# Patient Record
Sex: FEMALE | Race: BLACK OR AFRICAN AMERICAN | NOT HISPANIC OR LATINO | Employment: FULL TIME | URBAN - METROPOLITAN AREA
[De-identification: names, ages, dates, MRNs, and addresses within clinical notes are randomized per-mention and may not be internally consistent; named-entity substitution may affect disease eponyms.]

---

## 2017-02-13 ENCOUNTER — OFFICE VISIT (OUTPATIENT)
Dept: INTERNAL MEDICINE | Facility: CLINIC | Age: 47
End: 2017-02-13
Payer: COMMERCIAL

## 2017-02-13 VITALS — SYSTOLIC BLOOD PRESSURE: 133 MMHG | DIASTOLIC BLOOD PRESSURE: 72 MMHG | HEART RATE: 80 BPM

## 2017-02-13 DIAGNOSIS — J01.00 ACUTE MAXILLARY SINUSITIS, RECURRENCE NOT SPECIFIED: Primary | ICD-10-CM

## 2017-02-13 PROCEDURE — 99213 OFFICE O/P EST LOW 20 MIN: CPT | Mod: S$GLB,,, | Performed by: FAMILY MEDICINE

## 2017-02-13 PROCEDURE — 99999 PR PBB SHADOW E&M-EST. PATIENT-LVL II: CPT | Mod: PBBFAC,,, | Performed by: FAMILY MEDICINE

## 2017-02-13 RX ORDER — AZITHROMYCIN 250 MG/1
TABLET, FILM COATED ORAL
Qty: 6 TABLET | Refills: 0 | Status: SHIPPED | OUTPATIENT
Start: 2017-02-13 | End: 2018-04-07 | Stop reason: SDUPTHER

## 2017-02-13 NOTE — PROGRESS NOTES
Subjective:       Patient ID: Gisella Burgess is a 46 y.o. female.    Chief Complaint: No chief complaint on file.  Gisella Burgess 46 y.o. female is here for office visit to review care and physical exam, reports 3-4 days uri, sore throat, sinus drip and congestion, green d/c noted.  Was traveling. HAs been using cold med otc, flonase.      HPI  Review of Systems   Constitutional: Negative for activity change, fatigue, fever and unexpected weight change.   HENT: Negative for congestion, hearing loss, postnasal drip and rhinorrhea.    Eyes: Negative for redness and visual disturbance.   Respiratory: Negative for chest tightness, shortness of breath and wheezing.    Cardiovascular: Negative for chest pain, palpitations and leg swelling.   Gastrointestinal: Negative for abdominal distention.   Genitourinary: Negative for decreased urine volume, dysuria, flank pain, hematuria, pelvic pain and urgency.   Musculoskeletal: Negative for back pain, gait problem, joint swelling and neck stiffness.   Skin: Negative for color change, rash and wound.   Neurological: Negative for dizziness, syncope, weakness and headaches.   Psychiatric/Behavioral: Negative for behavioral problems, confusion and sleep disturbance. The patient is not nervous/anxious.        Objective:      Physical Exam   Constitutional: She is oriented to person, place, and time. She appears well-developed and well-nourished. No distress.   HENT:   Head: Normocephalic.   Mouth/Throat: No oropharyngeal exudate.   Eyes: EOM are normal. Pupils are equal, round, and reactive to light. No scleral icterus.   Neck: Neck supple. No JVD present. No thyromegaly present.   Cardiovascular: Normal rate, regular rhythm and normal heart sounds.  Exam reveals no gallop and no friction rub.    No murmur heard.  Pulmonary/Chest: Effort normal and breath sounds normal. She has no wheezes. She has no rales.   Abdominal: Soft. Bowel sounds are normal. She exhibits no  distension and no mass. There is no tenderness. There is no guarding.   Musculoskeletal: Normal range of motion. She exhibits no edema.   Lymphadenopathy:     She has no cervical adenopathy.   Neurological: She is alert and oriented to person, place, and time. She has normal reflexes. She displays normal reflexes. No cranial nerve deficit. She exhibits normal muscle tone.   Skin: Skin is warm. No rash noted. No erythema.   Psychiatric: She has a normal mood and affect. Thought content normal.       Assessment:       No diagnosis found.    Plan:       Diagnoses and all orders for this visit:    Acute maxillary sinusitis, recurrence not specified

## 2017-02-13 NOTE — MR AVS SNAPSHOT
[facility] - Internal Medicine  [address]  [facility] [address]  Phone: [phone]  Fax: [phone]                  Gisella Burgess   2017 10:00 AM   Office Visit    Description:  Female : 1970   Provider:  Gael Napier MD   Department:  [facility] - Internal Medicine           Diagnoses this Visit        Comments    Acute maxillary sinusitis, recurrence not specified    -  Primary            To Do List           Goals (5 Years of Data)     None       These Medications        Disp Refills Start End    azithromycin (ZITHROMAX Z-CHIDI) 250 MG tablet 6 tablet 0 2017     Use two first day then one a day    Pharmacy: [facility] [address] [place] [address] [address] AT [facility] Carlito Abdalla  #: [phone]         [facility] On Call     [facility] On Call Nurse Care Line -  Assistance  Registered nurses in the [facility] On Call Center provide clinical advisement, health education, appointment booking, and other advisory services.  Call for this free service at [phone].             Medications           Message regarding Medications     Verify the changes and/or additions to your medication regime listed below are the same as discussed with your clinician today.  If any of these changes or additions are incorrect, please notify your healthcare provider.        START taking these NEW medications        Refills    azithromycin (ZITHROMAX Z-CHIDI) 250 MG tablet 0    Sig: Use two first day then one a day    Class: Normal           Verify that the below list of medications is an accurate representation of the medications you are currently taking.  If none reported, the list may be blank. If incorrect, please contact your healthcare provider. Carry this list with you in case of emergency.           Current Medications     azithromycin (ZITHROMAX Z-CHIDI) 250 MG tablet Use two first day then one a day    EPIPEN 2-CHIDI 0.3 mg/0.3 mL (1:1,000) AtIn      fexofenadine (ALLEGRA) 30 MG tablet Take 30 mg by mouth 2 (two) times daily.    fluticasone (FLONASE) 50 mcg/actuation nasal spray 2 sprays by Each Nare route once daily.    gabapentin (NEURONTIN) 300 MG capsule Take 1 capsule (300 mg total) by mouth 3 (three) times daily.    hydrocortisone butyrate (LOCOID) 0.1 % Crea AAA daily for 1 week    levonorgestrel (MIRENA) 20 mcg/24 hr (5 years) IUD 1 each by Intrauterine route once.           Clinical Reference Information           Your Vitals Were     BP Pulse                133/72 80          Blood Pressure          Most Recent Value    BP  133/72      Allergies as of 2/13/2017     Codeine      Immunizations Administered on Date of Encounter - 2/13/2017     None      Language Assistance Services     ATTENTION: Language assistance services are available, free of charge. Please call [phone].      ATENCIÓN: Si sinai colunga, tiene a corona disposición servicios gratuitos de asistencia lingüística. Llame al [phone].     SAMSON Ý: N?u b?n nói Ti?ng Vi?t, có các d?ch v? h? tr? ngôn ng? mi?n phí dành cho b?n. G?i s? [phone].         [facility] complies with applicable Federal civil rights laws and does not discriminate on the basis of race, color, national origin, age, disability, or sex.

## 2017-03-17 ENCOUNTER — TELEPHONE (OUTPATIENT)
Dept: INTERNAL MEDICINE | Facility: CLINIC | Age: 47
End: 2017-03-17

## 2017-03-17 DIAGNOSIS — Z13.9 ENCOUNTER FOR SCREENING: Primary | ICD-10-CM

## 2017-03-17 NOTE — TELEPHONE ENCOUNTER
----- Message from Katty Obrien sent at 3/17/2017  9:40 AM CDT -----  Contact: self  Pt need a mammo order placed.  Please advise.  Thanks!

## 2017-03-27 ENCOUNTER — NURSE TRIAGE (OUTPATIENT)
Dept: ADMINISTRATIVE | Facility: CLINIC | Age: 47
End: 2017-03-27

## 2017-03-27 ENCOUNTER — OFFICE VISIT (OUTPATIENT)
Dept: INTERNAL MEDICINE | Facility: CLINIC | Age: 47
End: 2017-03-27
Payer: COMMERCIAL

## 2017-03-27 VITALS
HEART RATE: 82 BPM | BODY MASS INDEX: 38.42 KG/M2 | TEMPERATURE: 99 F | WEIGHT: 225.06 LBS | DIASTOLIC BLOOD PRESSURE: 92 MMHG | HEIGHT: 64 IN | SYSTOLIC BLOOD PRESSURE: 126 MMHG

## 2017-03-27 DIAGNOSIS — J06.9 VIRAL URI: Primary | ICD-10-CM

## 2017-03-27 PROCEDURE — 99213 OFFICE O/P EST LOW 20 MIN: CPT | Mod: S$GLB,,, | Performed by: INTERNAL MEDICINE

## 2017-03-27 PROCEDURE — 1160F RVW MEDS BY RX/DR IN RCRD: CPT | Mod: S$GLB,,, | Performed by: INTERNAL MEDICINE

## 2017-03-27 PROCEDURE — 99999 PR PBB SHADOW E&M-EST. PATIENT-LVL III: CPT | Mod: PBBFAC,,, | Performed by: INTERNAL MEDICINE

## 2017-03-27 NOTE — MR AVS SNAPSHOT
[facility] - Internal Medicine  [address]  [facility] [address]  Phone: [phone]  Fax: [phone]                  Gisella Burgess   3/27/2017 10:00 AM   Office Visit    Description:  Female : 1970   Provider:  Robert Hernandez MD   Department:  [facility] - Internal Medicine           Reason for Visit     Fever     Dizziness     Otalgia           Diagnoses this Visit        Comments    Viral URI    -  Primary            To Do List           Goals (5 Years of Data)     None      Follow-Up and Disposition     Follow-up and Disposition History      [facility]     [facility] Care [facility] -  Assistance  Registered nurses in the [facility] On [facility] Center provide clinical advisement, health education, appointment booking, and other advisory services.  Call for this free service at [phone].             Medications           Message regarding Medications     Verify the changes and/or additions to your medication regime listed below are the same as discussed with your clinician today.  If any of these changes or additions are incorrect, please notify your healthcare provider.             Verify that the below list of medications is an accurate representation of the medications you are currently taking.  If none reported, the list may be blank. If incorrect, please contact your healthcare provider. Carry this list with you in case of emergency.           Current Medications     azithromycin (ZITHROMAX Z-CHIID) 250 MG tablet Use two first day then one a day    EPIPEN 2-CHIDI 0.3 mg/0.3 mL (1:1,000) AtIn     fexofenadine (ALLEGRA) 30 MG tablet Take 30 mg by mouth 2 (two) times daily.    fluticasone (FLONASE) 50 mcg/actuation nasal spray 2 sprays by Each Nare route once daily.    gabapentin (NEURONTIN) 300 MG capsule Take 1 capsule (300 mg total) by mouth 3 (three) times daily.    hydrocortisone butyrate (LOCOID) 0.1 % Crea AAA daily for 1 week    levonorgestrel (MIRENA) 20 mcg/24  "hr (5 years) IUD 1 each by Intrauterine route once.           Clinical Reference Information           Your Vitals Were     BP Pulse Temp Height Weight BMI    126/92 (BP Location: Right arm, Patient Position: Sitting, BP Method: Manual) 82 98.7 °F (37.1 °C) (Oral) 5' 4" (1.626 m) 102.1 kg (225 lb 1.4 oz) 38.64 kg/m2      Blood Pressure          Most Recent Value    BP  (!)  126/92      Allergies as of 3/27/2017     Codeine      Immunizations Administered on Date of Encounter - 3/27/2017     None      Instructions    Continue Allegra and Tylenol. Start over-the-counter Mucinex-D. If symptoms are worse at night, you can also add over-the-counter Benadryl at night.       Language Assistance Services     ATTENTION: Language assistance services are available, free of charge. Please call [phone].      ATENCIÓN: Si habla español, tiene a corona disposición servicios gratuitos de asistencia lingüística. Llame al [phone].     SAMSON Ý: N?u b?n nói Ti?ng Vi?t, có các d?ch v? h? tr? ngôn ng? mi?n phí dành cho b?n. G?i s? [phone].         [facility] complies with applicable Federal civil rights laws and does not discriminate on the basis of race, color, national origin, age, disability, or sex.        "

## 2017-03-27 NOTE — PATIENT INSTRUCTIONS
Continue Allegra and Tylenol. Start over-the-counter Mucinex-D. If symptoms are worse at night, you can also add over-the-counter Benadryl at night.

## 2017-03-27 NOTE — PROGRESS NOTES
Subjective:       Patient ID: Gisella Burgess is a 46 y.o. female.    Chief Complaint: Fever; Dizziness; and Otalgia    HPI    Patient is accompanied by her .    Patient of Gael Napier MD, here today for Urgent Care visit. Seen last mo for sinusitis.     Sat PM started feeling lousy. Developed chills, warm. Ear pain in left ear. Sat PM stayed in bed. Started Tylenol Sat PM, Sun felt better, had some dizziness when at yoga. By early afternoon on Sun had fever and chills. Went on all night into this morning. Congestion started this morning. yesterday ear was really bad. Some nausea, no vomiting. No body aches, but has hot and cold feeling. Feels fatigued. Flew last week to [place]    Told to stop Flonase in past because of sinus infection. Stopped on Sat due to infection. Still using Allegra.    Review of Systems    As per HPI    Objective:      Physical Exam   Constitutional: No distress.   HENT:   Right Ear: Tympanic membrane normal.   Left Ear: Tympanic membrane normal.   Nose: Right sinus exhibits no maxillary sinus tenderness and no frontal sinus tenderness. Left sinus exhibits no maxillary sinus tenderness and no frontal sinus tenderness.   Mouth/Throat: Oropharynx is clear and moist. No oropharyngeal exudate.   Neck: Normal range of motion. No thyromegaly present.   Cardiovascular: Normal rate, regular rhythm and normal heart sounds.  Exam reveals no gallop and no friction rub.    No murmur heard.  Pulmonary/Chest: Effort normal and breath sounds normal. No stridor. She has no wheezes. She has no rales.   Lymphadenopathy:     She has no cervical adenopathy.   Skin: She is not diaphoretic.   Psychiatric: She has a normal mood and affect. Her behavior is normal.   Nursing note and vitals reviewed.      Vitals:    03/27/17 1019   BP: (!) 126/92   BP Location: Right arm   Patient Position: Sitting   BP Method: Manual   Pulse: 82   Temp: 98.7 °F (37.1 °C)   TempSrc: Oral   Weight: 102.1 kg (225 lb 1.4  "oz)   Height: 5' 4" (1.626 m)     Body mass index is 38.64 kg/(m^2).    Assessment:       1. Viral URI        Plan:   Gisella was seen today for fever, dizziness and otalgia.    Diagnoses and all orders for this visit:    Viral URI:  No evidence of bacterial infection or otitis. Pt stopped Flonase on Sat when symptoms began. Other over-the-counter meds as below. Please notify the office if the symptoms persist or worsen.   "Continue Allegra and Tylenol. Start over-the-counter Mucinex-D. If symptoms are worse at night, you can also add over-the-counter Benadryl at night."    Keep regular follow up appointments with Gael Napier MD.   Robert Hernandez MD  Internal Medicine    Portions of this note were completed using Dragon medical dictation software. Please excuse typographical or syntax errors.   "

## 2017-03-27 NOTE — TELEPHONE ENCOUNTER
Reason for Disposition   Patient wants to be seen    Protocols used: ST FEVER-A-OH    Pt reports left ear pain with fever since Saturday. Pt scheduled to see MD this morning

## 2017-03-27 NOTE — Clinical Note
Seen as Urgent Care visit for viral URI. She stopped Flonase on Saturday; instructed to continue over-the-counter meds. ~Robert

## 2017-03-29 ENCOUNTER — OFFICE VISIT (OUTPATIENT)
Dept: INTERNAL MEDICINE | Facility: CLINIC | Age: 47
End: 2017-03-29
Payer: COMMERCIAL

## 2017-03-29 ENCOUNTER — PATIENT MESSAGE (OUTPATIENT)
Dept: INTERNAL MEDICINE | Facility: CLINIC | Age: 47
End: 2017-03-29

## 2017-03-29 VITALS
TEMPERATURE: 98 F | HEART RATE: 109 BPM | HEIGHT: 64 IN | DIASTOLIC BLOOD PRESSURE: 80 MMHG | SYSTOLIC BLOOD PRESSURE: 132 MMHG | WEIGHT: 224 LBS | BODY MASS INDEX: 38.24 KG/M2 | OXYGEN SATURATION: 98 %

## 2017-03-29 DIAGNOSIS — J06.9 UPPER RESPIRATORY TRACT INFECTION, UNSPECIFIED TYPE: Primary | ICD-10-CM

## 2017-03-29 DIAGNOSIS — H69.92 ETD (EUSTACHIAN TUBE DYSFUNCTION), LEFT: ICD-10-CM

## 2017-03-29 PROCEDURE — 99213 OFFICE O/P EST LOW 20 MIN: CPT | Mod: S$GLB,,, | Performed by: ALLERGY & IMMUNOLOGY

## 2017-03-29 PROCEDURE — 99999 PR PBB SHADOW E&M-EST. PATIENT-LVL IV: CPT | Mod: PBBFAC,,, | Performed by: ALLERGY & IMMUNOLOGY

## 2017-03-29 PROCEDURE — 1160F RVW MEDS BY RX/DR IN RCRD: CPT | Mod: S$GLB,,, | Performed by: ALLERGY & IMMUNOLOGY

## 2017-03-29 RX ORDER — AMOXICILLIN AND CLAVULANATE POTASSIUM 875; 125 MG/1; MG/1
1 TABLET, FILM COATED ORAL EVERY 12 HOURS
Qty: 20 TABLET | Refills: 0 | Status: SHIPPED | OUTPATIENT
Start: 2017-03-29 | End: 2017-04-08

## 2017-03-29 RX ORDER — METHYLPREDNISOLONE 4 MG/1
TABLET ORAL
Qty: 1 PACKAGE | Refills: 0 | Status: SHIPPED | OUTPATIENT
Start: 2017-03-29 | End: 2017-04-19

## 2017-03-29 NOTE — MR AVS SNAPSHOT
[facility] - Internal Medicine  [address]  [place] [address]  Phone: [phone]  Fax: [phone]                  Gisella Burgess   3/29/2017 11:00 AM   Office Visit    Description:  Female : 1970   Provider:  DIANA Garcia   Department:  [facility] - Internal Medicine           Reason for Visit     Fever     Otalgia     Nasal Congestion           Diagnoses this Visit        Comments    Upper respiratory tract infection, unspecified type    -  Primary     ETD (eustachian tube dysfunction), left                To Do List           Future Appointments        Provider Department Dept Phone    2017 4:30 PM [facility] [id number] SCREEN INT MED [facility] [phone]      Goals (5 Years of Data)     None      Follow-Up and Disposition     Return if symptoms worsen or fail to improve.       These Medications        Disp Refills Start End    methylPREDNISolone (MEDROL DOSEPACK) 4 mg tablet 1 Package 0 3/29/2017 2017    use as directed    Pharmacy: [facility] [address] [place], [place] [address] [address] AT [facility] Carlito Abdalla Ph #: [phone]       amoxicillin-clavulanate 875-125mg (AUGMENTIN) 875-125 mg per tablet 20 tablet 0 3/29/2017 2017    Take 1 tablet by mouth every 12 (twelve) hours. - Oral    Pharmacy: [facility] [address] [place], [place] [address] [address] AT [facility] Carlito Abdalla Ph #: [phone]         [facility] On Call     [facility] Nurse Care Line -  Assistance  Registered nurses in the [facility] Center provide clinical advisement, health education, appointment booking, and other advisory services.  Call for this free service at [phone].             Medications           Message regarding Medications     Verify the changes and/or additions to your medication regime listed below are the same as discussed with your clinician today.  If any of these changes or additions  "are incorrect, please notify your healthcare provider.        START taking these NEW medications        Refills    methylPREDNISolone (MEDROL DOSEPACK) 4 mg tablet 0    Sig: use as directed    Class: Normal    amoxicillin-clavulanate 875-125mg (AUGMENTIN) 875-125 mg per tablet 0    Sig: Take 1 tablet by mouth every 12 (twelve) hours.    Class: Normal    Route: Oral           Verify that the below list of medications is an accurate representation of the medications you are currently taking.  If none reported, the list may be blank. If incorrect, please contact your healthcare provider. Carry this list with you in case of emergency.           Current Medications     amoxicillin-clavulanate 875-125mg (AUGMENTIN) 875-125 mg per tablet Take 1 tablet by mouth every 12 (twelve) hours.    azithromycin (ZITHROMAX Z-CHIDI) 250 MG tablet Use two first day then one a day    EPIPEN 2-CHIDI 0.3 mg/0.3 mL (1:1,000) AtIn     fexofenadine (ALLEGRA) 30 MG tablet Take 30 mg by mouth 2 (two) times daily.    fluticasone (FLONASE) 50 mcg/actuation nasal spray 2 sprays by Each Nare route once daily.    gabapentin (NEURONTIN) 300 MG capsule Take 1 capsule (300 mg total) by mouth 3 (three) times daily.    hydrocortisone butyrate (LOCOID) 0.1 % Crea AAA daily for 1 week    levonorgestrel (MIRENA) 20 mcg/24 hr (5 years) IUD 1 each by Intrauterine route once.    methylPREDNISolone (MEDROL DOSEPACK) 4 mg tablet use as directed           Clinical Reference Information           Your Vitals Were     BP Pulse Temp Height Weight SpO2    132/80 (BP Location: Right arm, Patient Position: Sitting, BP Method: Manual) 109 98.1 °F (36.7 °C) (Oral) 5' 4" (1.626 m) 101.6 kg (223 lb 15.8 oz) 98%    BMI                38.45 kg/m2          Blood Pressure          Most Recent Value    BP  132/80      Allergies as of 3/29/2017     Codeine      Immunizations Administered on Date of Encounter - 3/29/2017     None      Instructions    Afrin 12 hour for 3-5 days, max 5 " days take before flight  Flonase 1 spray each nostril 2 times a day  Mucinex D OTC  Allegra OTC  Start Medrol dose pack as directed on pack  If symptoms persist or worsen then start:  Augmentin 875 mg 1 tab twice a day for 10 days take with food         Language Assistance Services     ATTENTION: Language assistance services are available, free of charge. Please call [phone].      ATENCIÓN: Si habla español, tiene a corona disposición servicios gratuitos de asistencia lingüística. Llame al [phone].     CHÚ Ý: N?u b?n nói Ti?ng Vi?t, có các d?ch v? h? tr? ngôn ng? mi?n phí dành cho b?n. G?i s? [phone].         [facility] complies with applicable Federal civil rights laws and does not discriminate on the basis of race, color, national origin, age, disability, or sex.

## 2017-03-29 NOTE — PATIENT INSTRUCTIONS
Afrin 12 hour for 3-5 days, max 5 days take before flight  Flonase 1 spray each nostril 2 times a day  Mucinex D OTC  Allegra OTC  Tylenol as needed  Start Medrol dose pack as directed on pack  If symptoms persist or worsen then start:  Augmentin 875 mg 1 tab twice a day for 10 days take with food

## 2017-03-29 NOTE — PROGRESS NOTES
Subjective:       Patient ID: Gisella Burgess is a 46 y.o. female.    Chief Complaint: Fever (otc allegra and benadryl); Otalgia (tylenol as well); and Nasal Congestion    HPI Comments: Patient presents today with the following: Traveled last week in [facility] had an allergic flare. Took allergra and flonase. Increase in symptoms once home. She traveled with 2 other women who are currently having similar URI symptoms. Current symtpoms with fever of 100 began Saturday, saw Dr. Hernandez on Monday treated for viral URI. Temperature did resolve for 2 days then reoccured last night does respond to tylenol from 100 to 99 orally. Current symptoms nasal congestion yellowish to brown,  left ear pain worsening, PND, Sinus pressure. Will be traveling on flight to [place] tomorrow for 5 days with work.    Review of Systems   Constitutional: Negative for activity change, appetite change, chills, diaphoresis, fatigue, fever and unexpected weight change.   HENT: Positive for congestion, postnasal drip, rhinorrhea, sinus pressure and sore throat. Negative for dental problem, drooling, ear discharge, ear pain, facial swelling, hearing loss, mouth sores, nosebleeds, sneezing, tinnitus, trouble swallowing and voice change.    Eyes: Positive for itching. Negative for photophobia, pain, discharge, redness and visual disturbance.   Respiratory: Positive for cough. Negative for apnea, choking, chest tightness, shortness of breath, wheezing and stridor.    Cardiovascular: Negative for chest pain, palpitations and leg swelling.   Gastrointestinal: Negative for abdominal distention, abdominal pain, constipation, diarrhea, nausea and vomiting.   Endocrine: Negative for cold intolerance, heat intolerance, polydipsia, polyphagia and polyuria.   Genitourinary: Negative for difficulty urinating, dysuria, enuresis, flank pain, frequency, hematuria, menstrual problem, pelvic pain and urgency.   Musculoskeletal: Negative for arthralgias, back  pain, gait problem, joint swelling, myalgias, neck pain and neck stiffness.   Skin: Negative for color change, pallor, rash and wound.   Allergic/Immunologic: Negative for environmental allergies, food allergies and immunocompromised state.   Neurological: Negative for dizziness, tremors, seizures, syncope, facial asymmetry, speech difficulty, weakness, light-headedness, numbness and headaches.   Hematological: Negative for adenopathy. Does not bruise/bleed easily.   Psychiatric/Behavioral: Negative for agitation, behavioral problems, confusion, decreased concentration, dysphoric mood, hallucinations, self-injury, sleep disturbance and suicidal ideas. The patient is not nervous/anxious and is not hyperactive.    All other systems reviewed and are negative.    Objective:   Physical Exam   Constitutional: She is oriented to person, place, and time. She appears well-developed and well-nourished. She is active and cooperative.  Non-toxic appearance. She does not have a sickly appearance. She does not appear ill. No distress. She is not intubated.   HENT:   Head: Normocephalic and atraumatic.   Right Ear: Hearing, tympanic membrane, external ear and ear canal normal. No lacerations. No drainage, swelling or tenderness. No foreign bodies. No mastoid tenderness. Tympanic membrane is not injected, not scarred, not perforated, not erythematous, not retracted and not bulging. Tympanic membrane mobility is normal. No middle ear effusion. No hemotympanum. No decreased hearing is noted.   Left Ear: Hearing, tympanic membrane, external ear and ear canal normal. No lacerations. No drainage, swelling or tenderness. No foreign bodies. No mastoid tenderness. Tympanic membrane is not injected, not scarred, not perforated, not erythematous, not retracted and not bulging. Tympanic membrane mobility is normal.  No middle ear effusion. No hemotympanum. No decreased hearing is noted.   Nose: Mucosal edema and rhinorrhea present. No nose  lacerations, sinus tenderness, nasal deformity, septal deviation or nasal septal hematoma. No epistaxis.  No foreign bodies. Right sinus exhibits maxillary sinus tenderness and frontal sinus tenderness. Left sinus exhibits maxillary sinus tenderness and frontal sinus tenderness.   Mouth/Throat: Uvula is midline, oropharynx is clear and moist and mucous membranes are normal. She does not have dentures. No oral lesions. No trismus in the jaw. Normal dentition. No dental abscesses, uvula swelling, lacerations or dental caries. No oropharyngeal exudate, posterior oropharyngeal edema, posterior oropharyngeal erythema or tonsillar abscesses. No tonsillar exudate.   Bilateral allergic shinners   Eyes: Conjunctivae, EOM and lids are normal. Pupils are equal, round, and reactive to light. Right eye exhibits no chemosis, no discharge, no exudate and no hordeolum. No foreign body present in the right eye. Left eye exhibits no chemosis, no discharge, no exudate and no hordeolum. No foreign body present in the left eye. Right conjunctiva is not injected. Right conjunctiva has no hemorrhage. Left conjunctiva is not injected. Left conjunctiva has no hemorrhage. No scleral icterus.   Neck: Trachea normal, normal range of motion, full passive range of motion without pain and phonation normal. Neck supple. No tracheal tenderness, no spinous process tenderness and no muscular tenderness present. No rigidity. No tracheal deviation, no edema, no erythema and normal range of motion present. No thyroid mass and no thyromegaly present.   Cardiovascular: Normal rate, regular rhythm, S1 normal, S2 normal, normal heart sounds and normal pulses.  Exam reveals no gallop and no friction rub.    No murmur heard.  Pulmonary/Chest: Effort normal and breath sounds normal. No accessory muscle usage or stridor. No apnea, no tachypnea and no bradypnea. She is not intubated. No respiratory distress. She has no decreased breath sounds. She has no  wheezes. She has no rhonchi. She has no rales. She exhibits no tenderness.   Abdominal: Soft. Normal appearance and bowel sounds are normal. She exhibits no distension and no mass. There is no tenderness.   Musculoskeletal: Normal range of motion. She exhibits no edema, tenderness or deformity.   Lymphadenopathy:        Head (right side): No submental, no submandibular, no tonsillar, no preauricular, no posterior auricular and no occipital adenopathy present.        Head (left side): No submental, no submandibular, no tonsillar, no preauricular, no posterior auricular and no occipital adenopathy present.     She has no cervical adenopathy.        Right cervical: No superficial cervical, no deep cervical and no posterior cervical adenopathy present.       Left cervical: No superficial cervical, no deep cervical and no posterior cervical adenopathy present.   Neurological: She is alert and oriented to person, place, and time. She has normal strength. She is not disoriented.   Skin: Skin is warm, dry and intact. No abrasion, no bruising, no burn, no ecchymosis, no laceration, no lesion, no petechiae, no purpura and no rash noted. Rash is not macular, not maculopapular, not nodular, not pustular, not vesicular and not urticarial. She is not diaphoretic. No cyanosis or erythema. No pallor. Nails show no clubbing.   Psychiatric: She has a normal mood and affect. Her speech is normal and behavior is normal. Judgment and thought content normal. Cognition and memory are normal.   Nursing note and vitals reviewed.    Assessment:     1. Upper respiratory tract infection, unspecified type    2. ETD (eustachian tube dysfunction), left      Plan:   Gisella was seen today for fever, otalgia and nasal congestion.    Diagnoses and all orders for this visit:    Upper respiratory tract infection, unspecified type  -     amoxicillin-clavulanate 875-125mg (AUGMENTIN) 875-125 mg per tablet; Take 1 tablet by mouth every 12 (twelve)  hours.    ETD (eustachian tube dysfunction), left  -     methylPREDNISolone (MEDROL DOSEPACK) 4 mg tablet; use as directed    Afrin 12 hour for 3-5 days, max 5 days take before flight  Flonase 1 spray each nostril 2 times a day  Mucinex D OTC  Allegra OTC  Tylenol as needed  Bring with you to [place]:  Start Medrol dose pack as directed on pack  If symptoms persist or worsen then start:  Augmentin 875 mg 1 tab twice a day for 10 days take with food      Return if symptoms worsen or fail to improve.    Discussed options and strategies  Reviewed medications risk v. Benefit  Reviewed pathophysiology  All questions answered  Emotional support provided  Pt verbalized understanding of all the above and treatment plan.      DIANA Garcia

## 2017-04-07 ENCOUNTER — HOSPITAL ENCOUNTER (OUTPATIENT)
Dept: RADIOLOGY | Facility: HOSPITAL | Age: 47
Discharge: HOME | End: 2017-04-07
Attending: FAMILY MEDICINE
Payer: COMMERCIAL

## 2017-04-07 DIAGNOSIS — Z13.9 ENCOUNTER FOR SCREENING: ICD-10-CM

## 2017-04-07 PROCEDURE — 77067 SCR MAMMO BI INCL CAD: CPT | Mod: 26,,, | Performed by: RADIOLOGY

## 2017-04-07 PROCEDURE — 77067 SCR MAMMO BI INCL CAD: CPT | Mod: TC

## 2017-11-02 DIAGNOSIS — M41.20 SCOLIOSIS (AND KYPHOSCOLIOSIS), IDIOPATHIC: ICD-10-CM

## 2017-11-02 DIAGNOSIS — M43.10 ACQUIRED SPONDYLOLISTHESIS: ICD-10-CM

## 2017-11-02 DIAGNOSIS — M54.17 THORACIC AND LUMBOSACRAL NEURITIS: ICD-10-CM

## 2017-11-02 DIAGNOSIS — M47.819 SPONDYLOSIS WITHOUT MYELOPATHY: ICD-10-CM

## 2017-11-02 DIAGNOSIS — M54.14 THORACIC AND LUMBOSACRAL NEURITIS: ICD-10-CM

## 2017-11-02 DIAGNOSIS — M54.42 BILATERAL LOW BACK PAIN WITH LEFT-SIDED SCIATICA: ICD-10-CM

## 2017-11-03 RX ORDER — GABAPENTIN 300 MG/1
300 CAPSULE ORAL 3 TIMES DAILY
Qty: 90 CAPSULE | Refills: 2 | Status: SHIPPED | OUTPATIENT
Start: 2017-11-03 | End: 2018-12-07

## 2017-11-03 NOTE — TELEPHONE ENCOUNTER
Patient not seen in over a year. She would need to see me prior to any additional refills or she can get from PCP.

## 2017-11-07 ENCOUNTER — OFFICE VISIT (OUTPATIENT)
Dept: INTERNAL MEDICINE | Facility: CLINIC | Age: 47
End: 2017-11-07
Payer: COMMERCIAL

## 2017-11-07 ENCOUNTER — INITIAL CONSULT (OUTPATIENT)
Dept: OPTOMETRY | Facility: CLINIC | Age: 47
End: 2017-11-07
Payer: COMMERCIAL

## 2017-11-07 VITALS
DIASTOLIC BLOOD PRESSURE: 85 MMHG | WEIGHT: 216.25 LBS | BODY MASS INDEX: 36.92 KG/M2 | HEIGHT: 64 IN | SYSTOLIC BLOOD PRESSURE: 130 MMHG

## 2017-11-07 DIAGNOSIS — R51.9 NONINTRACTABLE HEADACHE, UNSPECIFIED CHRONICITY PATTERN, UNSPECIFIED HEADACHE TYPE: ICD-10-CM

## 2017-11-07 DIAGNOSIS — H52.223 REGULAR ASTIGMATISM OF BOTH EYES: ICD-10-CM

## 2017-11-07 DIAGNOSIS — H57.11 PAIN OF RIGHT EYE: ICD-10-CM

## 2017-11-07 DIAGNOSIS — R51.9 FACE PAIN: Primary | ICD-10-CM

## 2017-11-07 DIAGNOSIS — H57.11 EYE PAIN, RIGHT: Primary | ICD-10-CM

## 2017-11-07 DIAGNOSIS — G50.0 TRIGEMINAL NEURALGIA: ICD-10-CM

## 2017-11-07 PROCEDURE — 92004 COMPRE OPH EXAM NEW PT 1/>: CPT | Mod: S$GLB,,, | Performed by: OPTOMETRIST

## 2017-11-07 PROCEDURE — 92015 DETERMINE REFRACTIVE STATE: CPT | Mod: S$GLB,,, | Performed by: OPTOMETRIST

## 2017-11-07 PROCEDURE — 99999 PR PBB SHADOW E&M-EST. PATIENT-LVL II: CPT | Mod: PBBFAC,,, | Performed by: OPTOMETRIST

## 2017-11-07 PROCEDURE — 99214 OFFICE O/P EST MOD 30 MIN: CPT | Mod: S$GLB,,, | Performed by: INTERNAL MEDICINE

## 2017-11-07 PROCEDURE — 99999 PR PBB SHADOW E&M-EST. PATIENT-LVL IV: CPT | Mod: PBBFAC,,, | Performed by: INTERNAL MEDICINE

## 2017-11-07 RX ORDER — BUTALBITAL, ACETAMINOPHEN AND CAFFEINE 50; 325; 40 MG/1; MG/1; MG/1
1 TABLET ORAL EVERY 4 HOURS PRN
Qty: 20 TABLET | Refills: 0 | Status: SHIPPED | OUTPATIENT
Start: 2017-11-07 | End: 2017-12-07

## 2017-11-07 NOTE — PATIENT INSTRUCTIONS
You have ocular allergies that are causing your eyes to feel itchy. To help with your symptoms please use over-the-counter Zaditor or Alaway; 1 drop in each eye 2 times per day. You can also use cool compresses as needed.

## 2017-11-07 NOTE — PROGRESS NOTES
Subjective:       Patient ID: Gisella Burgess is a 47 y.o. female.    Chief Complaint: Headache and Nausea     HPI:  Urgent care visit, patient usually sees Dr. Napier.  She complains of face and right eye pain.  She has a history of trigeminal neuralgia, somewhat atypical.  She has had this off and on for a year.  She started taking gabapentin a few weeks ago with some relief but says this does not exactly feel like the trigeminal neuralgia.  She may have some sinus congestion but does not think she has an infection.  She denies change in vision but has had some mild right eye discomfort.  In the past she had some eye inflammation but has not followed up on that.  She did try some Excedrin with limited relief.  No ear problems or sore throat.  Some sinus drainage.  She takes Flonase and Allegra .    I reviewed the note where neurology discussed her condition related to possible atypical trigeminal neuralgia      Headache    Associated symptoms include eye pain and nausea. Pertinent negatives include no abdominal pain, coughing, fever, neck pain or sore throat.   Nausea   Associated symptoms include congestion, headaches and nausea. Pertinent negatives include no abdominal pain, chest pain, chills, coughing, fever, neck pain, rash or sore throat.     Review of Systems   Constitutional: Negative for appetite change, chills and fever.   HENT: Positive for congestion and postnasal drip. Negative for nosebleeds and sore throat.    Eyes: Positive for pain.   Respiratory: Negative for cough, shortness of breath and wheezing.    Cardiovascular: Negative for chest pain and leg swelling.   Gastrointestinal: Positive for nausea. Negative for abdominal pain, constipation and diarrhea.   Genitourinary: Negative for difficulty urinating.   Musculoskeletal: Negative for neck pain and neck stiffness.   Skin: Negative for rash.   Neurological: Positive for headaches.       Objective:      Physical Exam   Constitutional: She is  oriented to person, place, and time. She appears well-developed and well-nourished. No distress.   HENT:   Head: Normocephalic and atraumatic.   Right Ear: External ear normal.   Left Ear: External ear normal.   Mouth/Throat: No oropharyngeal exudate.   Mild boggy red nasal mucosa.  No tenderness to palpation over the face color scalp.  Mild right eye sensitivity.   Eyes: Conjunctivae and EOM are normal. Pupils are equal, round, and reactive to light. Right eye exhibits no discharge. Left eye exhibits no discharge. No scleral icterus.   Pupils reactive, extraocular eye movements are intact.  Conjunctivae unremarkable   Neck: Normal range of motion. Neck supple. No thyromegaly present.   Cardiovascular: Normal rate and regular rhythm.    No murmur heard.  Pulmonary/Chest: Effort normal and breath sounds normal. She has no wheezes.   Abdominal: Soft. Bowel sounds are normal. She exhibits no distension. There is no tenderness.   Musculoskeletal: She exhibits no tenderness.   Lymphadenopathy:     She has no cervical adenopathy.   Neurological: She is alert and oriented to person, place, and time. No cranial nerve deficit. Coordination normal.   Skin: No rash noted.   Psychiatric: She has a normal mood and affect.       Assessment:       1. Face pain    2. Nonintractable headache, unspecified chronicity pattern, unspecified headache type    3. Trigeminal neuralgia    4. Pain of right eye        Plan:       Gisella was seen today for headache and nausea.    Diagnoses and all orders for this visit:    Face pain  -     Ambulatory referral to Optometry    Nonintractable headache, unspecified chronicity pattern, unspecified headache type  -     Ambulatory referral to Optometry    Trigeminal neuralgia  -     Ambulatory referral to Optometry    Pain of right eye    Other orders  -     butalbital-acetaminophen-caffeine -40 mg (FIORICET, ESGIC) -40 mg per tablet; Take 1 tablet by mouth every 4 (four) hours as needed  for Pain.            Potential side effects of medication discussed.Follow-up after above.  Note to Dr. Napier.

## 2017-11-07 NOTE — LETTER
November 7, 2017      Zeus Valenzuela MD  [address]  [facility] [address]           [facility]  [address]  [facility] [address]  Phone: [phone]          Patient: Gisella Burgess   MR Number: [id number]   YOB: 1970   Date of Visit: 11/7/2017       Dear Dr. Zeus Valenzuela:    Thank you for referring Gisella Burgess to me for evaluation. Attached you will find relevant portions of my assessment and plan of care.    If you have questions, please do not hesitate to call me. I look forward to following Gisella Burgess along with you.    Sincerely,    Deborah Taylor, OD    Enclosure  CC:  No Recipients    If you would like to receive this communication electronically, please contact [email or web address] or [phone] to request more information on [employer] access.    For providers and/or their staff who would like to refer a patient to [facility], please contact us through our one-stop-shop provider referral line, [facility], at [phone].    If you feel you have received this communication in error or would no longer like to receive these types of communications, please e-mail [email or web address]

## 2017-11-07 NOTE — PROGRESS NOTES
HPI     Ms. Burgess was referred by Dr. Valenzuela for face pain and headaches.    She reports dull/achy pain/pressure OD and radiating to top-right of head.   Constant x 4 days. No previous occurrences despite history of trigeminal   neuralgia. No light sensitivity or recent vision changes.     She reports clear vision all ranges without glasses. However, she noticed   blurred vision after a few hours of computer/reading. Pt has not needed   any glasses for the past 18 years.    (+)drops: Systane prn (BID-TID when allergies flare up)  (-)flashes  (-)floaters  (-)diplopia    Diabetic no  Hemoglobin A1C       Date                     Value               Ref Range             Status                02/16/2015               5.4                 4.5 - 6.2 %           Final            ----------    OCULAR HISTORY  Last Eye Exam about 4 years ago  (-)eye surgery   (-)eye injury  Dry eyes  Ocular allergies  Uveitis OD (1999, 2001)     FAMILY HISTORY  (-)Glaucoma       Last edited by Deborah Taylor, OD on 11/7/2017 11:10 AM. (History)            Assessment /Plan     For exam results, see Encounter Report.    Eye pain, right   No visual or ocular etiology noted today. Possibly related to trigeminal neuralgia. Take medication as prescribed by Internal Medicine. If no improvement then f/u with PCP.    Regular astigmatism of both eyes   Minimal refractive error OU. Glasses not needed. Monitor.      RTC 1-2 years

## 2017-11-07 NOTE — Clinical Note
Dear Dr. Valenzuela,  Thank you for referring Ms. Burgess for an eye examination. Her eyes are healthy with no signs of uveitis. I can find no visual or ocular cause for her eye pain. Please let me know if you have questions.  Sincerely, Deborah aTylor OD

## 2017-11-17 ENCOUNTER — OFFICE VISIT (OUTPATIENT)
Dept: INTERNAL MEDICINE | Facility: CLINIC | Age: 47
End: 2017-11-17
Payer: COMMERCIAL

## 2017-11-17 VITALS
HEART RATE: 82 BPM | SYSTOLIC BLOOD PRESSURE: 126 MMHG | HEIGHT: 64 IN | BODY MASS INDEX: 37.3 KG/M2 | OXYGEN SATURATION: 100 % | WEIGHT: 218.5 LBS | DIASTOLIC BLOOD PRESSURE: 82 MMHG

## 2017-11-17 DIAGNOSIS — G50.0 TRIGEMINAL NEURALGIA: Primary | ICD-10-CM

## 2017-11-17 PROCEDURE — 99999 PR PBB SHADOW E&M-EST. PATIENT-LVL IV: CPT | Mod: PBBFAC,,, | Performed by: FAMILY MEDICINE

## 2017-11-17 PROCEDURE — 99214 OFFICE O/P EST MOD 30 MIN: CPT | Mod: S$GLB,,, | Performed by: FAMILY MEDICINE

## 2017-11-17 NOTE — PROGRESS NOTES
Subjective:       Patient ID: Gisella Burgess is a 47 y.o. female.    Chief Complaint: No chief complaint on file.  Gisella Burgess 47 y.o. female is here for office visit to review care and physical exam, here for appt discuss facial pain and tingling.  Has flairs of Trig area discomfort.  Meds w/o much reliedf except Allegra D, maybe.      HPI  Review of Systems   Constitutional: Negative for activity change, fatigue, fever and unexpected weight change.   HENT: Negative for congestion, hearing loss, postnasal drip and rhinorrhea.    Eyes: Negative for redness and visual disturbance.   Respiratory: Negative for chest tightness, shortness of breath and wheezing.    Cardiovascular: Negative for chest pain, palpitations and leg swelling.   Gastrointestinal: Negative for abdominal distention.   Genitourinary: Negative for decreased urine volume, dysuria, flank pain, hematuria, pelvic pain and urgency.   Musculoskeletal: Negative for back pain, gait problem, joint swelling and neck stiffness.   Skin: Negative for color change, rash and wound.   Neurological: Negative for dizziness, syncope, weakness and headaches.   Psychiatric/Behavioral: Negative for behavioral problems, confusion and sleep disturbance. The patient is not nervous/anxious.        Objective:      Physical Exam   Constitutional: She is oriented to person, place, and time. She appears well-developed and well-nourished. No distress.   HENT:   Head: Normocephalic.   Mouth/Throat: No oropharyngeal exudate.   Eyes: EOM are normal. Pupils are equal, round, and reactive to light. No scleral icterus.   Neck: Neck supple. No JVD present. No thyromegaly present.   Cardiovascular: Normal rate, regular rhythm and normal heart sounds.  Exam reveals no gallop and no friction rub.    No murmur heard.  Pulmonary/Chest: Effort normal and breath sounds normal. She has no wheezes. She has no rales.   Abdominal: Soft. Bowel sounds are normal. She exhibits no  distension and no mass. There is no tenderness. There is no guarding.   Musculoskeletal: Normal range of motion. She exhibits no edema.   Lymphadenopathy:     She has no cervical adenopathy.   Neurological: She is alert and oriented to person, place, and time. She has normal reflexes. She displays normal reflexes. No cranial nerve deficit. She exhibits normal muscle tone.   Skin: Skin is warm. No rash noted. No erythema.   Psychiatric: She has a normal mood and affect. Thought content normal.       Assessment:       No diagnosis found.    Plan:       Gisella was seen today for follow-up.    Diagnoses and all orders for this visit:    Trigeminal neuralgia  -     Ambulatory Referral to Neurology

## 2018-02-07 ENCOUNTER — OFFICE VISIT (OUTPATIENT)
Dept: URGENT CARE | Facility: CLINIC | Age: 48
End: 2018-02-07
Payer: COMMERCIAL

## 2018-02-07 VITALS
TEMPERATURE: 97 F | BODY MASS INDEX: 35.85 KG/M2 | HEART RATE: 83 BPM | SYSTOLIC BLOOD PRESSURE: 153 MMHG | OXYGEN SATURATION: 99 % | DIASTOLIC BLOOD PRESSURE: 96 MMHG | WEIGHT: 210 LBS | HEIGHT: 64 IN | RESPIRATION RATE: 14 BRPM

## 2018-02-07 DIAGNOSIS — Z91.010 PEANUT ALLERGY: ICD-10-CM

## 2018-02-07 DIAGNOSIS — Z88.9 HISTORY OF ALLERGIC REACTION: Primary | ICD-10-CM

## 2018-02-07 PROCEDURE — 99214 OFFICE O/P EST MOD 30 MIN: CPT | Mod: S$GLB,,, | Performed by: FAMILY MEDICINE

## 2018-02-07 PROCEDURE — 3008F BODY MASS INDEX DOCD: CPT | Mod: S$GLB,,, | Performed by: FAMILY MEDICINE

## 2018-02-07 NOTE — PATIENT INSTRUCTIONS
Please return here or go to the Emergency Department for any concerns or worsening of condition.  Avoid all foods or liquids that contain peanuts  Continue to carry your Epi Pen and Use as Previously Prescribed.   As discussed, take Claritin or Zyrtec daily as directed for the next 5-7 days  Please follow up with your primary care doctor or specialist in the next 48-72hrs if no improvement or sooner if needed.  If you  smoke, please stop smoking.  Food Allergy  The best way to deal with food allergies is to avoid the foods you are allergic to. Understand and be aware of the foods that you have reacted to. Also be cautious of foods or dishes that may have flavorings or small amounts of foods that you are allergic to.  Symptoms of food allergy may begin within minutes, but can start 2 hours after eating or later. Common symptoms can include:  · Nausea  · Vomiting  · Diarrhea or stomach cramps  · Iitchy rash (hives)  · Swelling of the eyes, lips, face or tongue  · Wheezing  · Difficulty breathing or swallowing  · Throat tightness  · Dizziness or fainting  This kind of allergic reaction, called anaphylaxis, can be life-threatening. In mild and moderate cases the symptoms usually begin improving within 6 to 24 hours. People with certain health problems, such as asthma and eczema, may be more likely to have food allergies. Foods that people are most commonly allergic to are milk or dairy products, eggs, peanuts, tree nuts, soy, shellfish, and wheat. Remember that any food can cause a reaction. Treatment for a severe allergic reaction can include epinephrine. If you have a severe food allergy, or have had severe allergic reactions even if you don't know the cause, you should carry this medicine with you for self-injection. It is available by prescription. It is also available in a lower dose form for children from your healthcare provider.  Home care  The following guidelines will help you care for yourself at home:  · If  "your symptoms were moderate to severe, they may fluctuate for the next 24 hours. It may be best to rest at home during that time.  · Avoid tobacco and alcohol because they can make symptoms worse. They can also interact with the medicines you are taking to treat the allergic reaction.  · If you know what foods caused your reaction today, avoid them in the future. The next and each reaction after this may make your body more sensitive to these foods. This can cause a worse reaction later. Tell your family members, friends, and doctors about your food allergy, especially in an emergency situation since they need to know how to give you epinephrine if you are unable to. This can be life-saving.  · Learn how to read food labels so you can check for the substance that you reacted to. If a food does not have a label, it is best to avoid it. When in restaurants, ask about ingredients and tell the staff, "If I eat a dish containing (food you are allergic to), I could have a severe allergic reaction."  · If your reaction was severe, get a medical alert bracelet or necklace that notes your allergy.  · If epinephrine is prescribed, carry it with you at all times. Learn how to use the device. If you begin to feel the symptoms of another reaction, use the epinephrine to inject yourself right away, and call 911. Dont wait until symptoms become severe.  · Oral allergy medicines (diphenhydramine) are antihistamines that can help with the reaction. You can buy them at any pharmacy or supermarket. They come in liquids, pills, or capsules. Unless your doctor gave you a prescription antihistamine, you can use these medicines to ease itching. Allergy medicines can make you sleepy, so be careful, especially when driving or working. For this reason, you may want to use lower doses during the day and save the higher doses for bedtime. Don't use diphenhydramine if you have glaucoma or if you are a man with trouble urinating because of an " enlarged prostate.  · If allergy medicines with diphenhydramine make you too sleepy, talk with your healthcare provider. He or she can recommend an over-the counter antihistamine that won't make you sleepy. These may not work as well, though.  Follow-up care  Follow up with your healthcare provider if your symptoms don't get better over the next 2 to 3 days. If you don't know what caused this reaction, your provider may order skin tests and blood tests, or an elimination diet. You can find an allergy specialist in your area by contacting:  · [employer]hma & Immun[employer], www.aaaai.org  · [employer] & [employer], www.acaai.org  When to seek medical advice  Call your [facility] provider right away if any of these occur:  · Your symptoms get worse  · New or worse swelling in the face, eyelids, lips, mouth, throat, or tongue  · Mild trouble swallowing, breathing, or wheezing  · Fever of 100.4°F (38.0°C) or higher, or as directed by your health care provider  · Severe abdominal pain  · Persistent vomiting (unable to keep liquids down) or constant diarrhea  · Blood or mucus in the stool  Call 911  If any of these occur, give yourself injectable epinephrine and call 911:  · Significant trouble breathing, talking, or swallowing  · Any change in level of alertness or unconsciousness, including dizziness, weakness, or fainting  · Cool, moist skin  · Fast, weak hearbeat  · Severe wheezing  · Hives  · Severe swelling of the face, tongue, or lips  · Drooling  · Vomiting that happens soon after eating a food you think you are allergic to  · Explosive diarrhea  Date Last Reviewed: 1/11/2016  © 1013-5013 [employer]. [address] [place] [address], [place] [address]. All rights reserved. This information is not intended as a substitute for professional medical care. Always follow your healthcare professional's instructions.        General Allergic Reactions  An allergic reaction  is a set of symptoms caused by an allergen. An allergen is something that causes a persons immune system to react. When a person comes in contact with an allergen, it causes the body to release chemicals. These include the chemical histamine. Histamine causes swelling and itching. It may affect the entire body. This is called a general allergic reaction. Often symptoms affect only 1 part of the body. This is called a local allergic reaction.  You are having an allergic reaction. Almost anything can cause one. Different people are allergic to different things. It is usually something that you ate or swallowed, came into contact with by getting or putting it on your skin or clothes, or something you breathed in the air. This can be very annoying and sometimes scary.  Most of us think of allergic reactions when we have a rash or itchy skin. Symptoms can include:  · Itching of the eyes, nose, and roof of the mouth  · Runny or stuffy nose  · Watery eyes   · Sneezing or coughing   · A blocked feeling in the ear  · Red, itchy rash called hives  · Red and purple spots  · Rash, redness, welts, blisters  · Itching, burning, stinging, pain  · Dry, flaky, cracking, scaly skin  Severe symptoms include:  · Swelling of the face, lips, or other parts of the body  · Hoarse voice  · Trouble swallowing, feeling like your throat is closing  · Trouble breathing, wheezing  · Nausea, vomiting, diarrhea, stomach cramps  · Feeling faint or lightheaded, rapid heart rate  Sometimes the cause may be obvious. But there are so many things that can cause a reaction that you may not be able to figure out. The most important things to help find your allergen are:  · Remembering when it started  · What you were doing at the time or just before that  · Any activities you were involved in  · Any new products or contacts  Below are some common causes. But remember that almost anything can cause a reaction. You may not even be aware that you came into  contact with one of these things:  · Dust, mold, pollen  · Plants (common ones are poison ivy and poison oak, but there are many others)   · Animals  · Foods such as shrimp, shellfish, peanuts, milk products, gluten, and eggs. Also food colorings, flavorings, and additives.  · Insect bites or stings such as bees, mosquitos, fleas, ticks  · Medicines such as penicillin, sulfa medicines, amoxicillin, aspirin, and ibuprofen. But any medicine can cause a reaction.  · Jewelry such as nickel or gold. This can be new, or something youve worn for a while, including zippers and buttons.  · Latex such as in gloves, clothes, toys, balloons, or some tapes. Some people allergic to latex may also have problems with foods like bananas, avocados, kiwi, papaya, or chestnuts.  · Lotions, perfumes, cosmetics, soaps, shampoos, skincare products, nail products  · Chemicals or dyes in clothing, linen, , hair dyes, soaps, iodine  Many viruses and common colds can cause a rash that is not an allergic reaction. Sometimes it is hard to tell the difference between allergies, sensitivity, or an intolerance to something. This is especially true with food. Many things can cause diarrhea, vomiting, stomach cramps, and skin irritation.  Home care    The goal of treatment is to help relieve the symptoms and get you feeling better. The rash will usually fade over several days. But it can sometimes last a couple of weeks. Over the next couple of days, there may be times when it is gets a little worse, and then better again. Here are some things to do:  · If you know what you are allergic to, stay away from it. Future reactions could be worse than this one.  · Avoid tight clothing and anything that heats up your skin (hot showers or baths, direct sunlight). Heat will make itching worse.  · An ice pack will relieve local areas of intense itching and redness. To make an ice pack, put ice cubes in a plastic bag that seals at the top. Wrap it in  a thin, clean towel. Dont put the ice directly on the skin because it can damage the skin.  · Oral diphenhydramine is an over-the-counter antihistamine sold at pharmacy and grocery stores. Unless a prescription antihistamine was given, diphenhydramine may be used to reduce itching if large areas of the skin are involved. It may make you sleepy. So be careful using it in the daytime or when going to school, working, or driving. Note: Dont use diphenhydramine if you have glaucoma or if you are a man with trouble urinating due to an enlarged prostate. There are other antihistamines that wont make you so sleepy. These are good choices for daytime use. Ask your pharmacist for suggestions.  · Dont use diphenhydramine cream on your skin. It can cause a further reaction in some people.  · To help prevent an infection, don't scratch the affected area. Scratching may worsen the reaction and damage your skin. It can also lead to an infection. Always check the affected for signs of an infection.  · Call your healthcare provider and ask what you can use to help decrease the itching.  · To decrease allergic reactions, try the following:    · Use heat-steam to clean your home  · Use high-efficiency particulate (HEPA) vacuums and filters  · Stay away from food and pet triggers  · Kill any cockroaches  · Clean your house often  Follow-up care  Follow up with your healthcare provider, or as advised. If you had a severe reaction today, or if you have had several mild to medium allergic reactions in the past, ask your provider about allergy testing. This can help you find out what you are allergic to. If your reaction included dizziness, fainting, or trouble breathing or swallowing, ask your provider about carrying auto-injectable epinephrine.  Call 911  Call 911 if any of these occur:  · Trouble breathing or swallowing, wheezing  · Cool, moist, pale skin  · Shortness of breath  · Hoarse voice or trouble  speaking  · Confused   · Very drowsy or trouble awakening  · Fainting or loss of consciousness  · Rapid heart rate  · Feeling of dizziness or weakness or a sudden drop in blood pressure  · Feeling of doom  · Feeling lightheaded  · Severe nausea or vomiting, or diarrhea  · Seizure  · Swelling in the face, eyelids, lips, mouth, throat or tongue  · Drooling  When to seek medical advice  Call your healthcare provider right away if any of these occur:  · Spreading areas of itching, redness or swelling  · Nausea or stomach cramps or abdominal pain  · Continuing or recurring symptoms  · Spreading areas of redness, swelling, or itching  · Signs of infection at the affected site:  ¨ Spreading redness  ¨ Increased pain or swelling  ¨ Fluid or colored drainage from the site  ¨ Fever of 100.4°F (38°C) or above lasting for 24 to 48 hours, or as directed by your provider  Date Last Reviewed: 3/1/2017  © 6382-5194 [employer]. [address] [place] [address], [place] [address]. All rights reserved. This information is not intended as a substitute for professional medical care. Always follow your healthcare professional's instructions.

## 2018-02-07 NOTE — PROGRESS NOTES
"Subjective:       Patient ID: Gisella Burgess is a 47 y.o. female.    Vitals:  height is 5' 4" (1.626 m) and weight is 95.3 kg (210 lb). Her temperature is 97.3 °F (36.3 °C). Her blood pressure is 153/96 (abnormal) and her pulse is 83. Her respiration is 14 and oxygen saturation is 99%.     Chief Complaint: Allergic Reaction (today around noon)    Pt accidentally ate something containing nuts which she is allergic to. Pt says her mouth grew tingly and numb and her condition has bettered since the incident at noon. Pt says she has no shortness of breath now but says she did feel like she was going to pass out. Pt took benedryl around 12:30 pm today. Pt works at the [employer] and is a local. [place]      Allergic Reaction   This is a new problem. The current episode started today. The problem has been gradually improving since onset. The problem is mild. The patient was exposed to nuts. The exposure occurred at work. Pertinent negatives include no abdominal pain, chest pain, chest pressure, coughing, diarrhea, difficulty breathing, eye itching, hyperventilation, itching, rash or vomiting. The treatment provided mild relief. Her past medical history is significant for food allergies. There is no history of asthma. There is no swelling present.     Review of Systems   Eyes: Negative for itching.   Cardiovascular: Negative for chest pain.   Respiratory: Negative for cough.    Skin: Negative for itching and rash.   Gastrointestinal: Negative for abdominal pain, diarrhea and vomiting.   Allergic/Immunologic: Positive for food allergies.       Objective:      Physical Exam   Constitutional: She is oriented to person, place, and time. She appears well-developed and well-nourished. She is cooperative.  Non-toxic appearance. She does not appear ill. No distress.   HENT:   Head: Normocephalic and atraumatic.   Right Ear: Hearing, tympanic membrane, external ear and ear canal normal.   Left Ear: Hearing, " tympanic membrane, external ear and ear canal normal.   Nose: Nose normal. No mucosal edema, rhinorrhea or nasal deformity. No epistaxis. Right sinus exhibits no maxillary sinus tenderness and no frontal sinus tenderness. Left sinus exhibits no maxillary sinus tenderness and no frontal sinus tenderness.   Mouth/Throat: Uvula is midline, oropharynx is clear and moist and mucous membranes are normal. No trismus in the jaw. Normal dentition. No uvula swelling. No posterior oropharyngeal erythema.   Posterior pharynx slightly injected but no edema present.   Eyes: Conjunctivae, EOM and lids are normal. Pupils are equal, round, and reactive to light. Right eye exhibits no discharge. Left eye exhibits no discharge. No scleral icterus.   Sclera clear bilat   Neck: Trachea normal, normal range of motion, full passive range of motion without pain and phonation normal. Neck supple.   Cardiovascular: Normal rate, regular rhythm, normal heart sounds, intact distal pulses and normal pulses.    Pulmonary/Chest: Effort normal and breath sounds normal. No respiratory distress.   Abdominal: Soft. Normal appearance and bowel sounds are normal. She exhibits no distension, no pulsatile midline mass and no mass. There is no tenderness.   Musculoskeletal: Normal range of motion. She exhibits no edema or deformity.   Neurological: She is alert and oriented to person, place, and time. She exhibits normal muscle tone. Coordination normal.   Skin: Skin is warm, dry and intact. She is not diaphoretic. No pallor.   Psychiatric: She has a normal mood and affect. Her speech is normal and behavior is normal. Judgment and thought content normal. Cognition and memory are normal.   Nursing note and vitals reviewed.      Assessment:       1. History of allergic reaction    2. Peanut allergy        Plan:         History of allergic reaction    Peanut allergy      Patient Instructions   Please return here or go to the Emergency Department for any  concerns or worsening of condition.  Avoid all foods or liquids that contain peanuts  Continue to carry your Epi Pen and Use as Previously Prescribed.   As discussed, take Claritin or Zyrtec daily as directed for the next 5-7 days  Please follow up with your primary care doctor or specialist in the next 48-72hrs if no improvement or sooner if needed.  If you  smoke, please stop smoking.  Food Allergy  The best way to deal with food allergies is to avoid the foods you are allergic to. Understand and be aware of the foods that you have reacted to. Also be cautious of foods or dishes that may have flavorings or small amounts of foods that you are allergic to.  Symptoms of food allergy may begin within minutes, but can start 2 hours after eating or later. Common symptoms can include:  · Nausea  · Vomiting  · Diarrhea or stomach cramps  · Iitchy rash (hives)  · Swelling of the eyes, lips, face or tongue  · Wheezing  · Difficulty breathing or swallowing  · Throat tightness  · Dizziness or fainting  This kind of allergic reaction, called anaphylaxis, can be life-threatening. In mild and moderate cases the symptoms usually begin improving within 6 to 24 hours. People with certain health problems, such as asthma and eczema, may be more likely to have food allergies. Foods that people are most commonly allergic to are milk or dairy products, eggs, peanuts, tree nuts, soy, shellfish, and wheat. Remember that any food can cause a reaction. Treatment for a severe allergic reaction can include epinephrine. If you have a severe food allergy, or have had severe allergic reactions even if you don't know the cause, you should carry this medicine with you for self-injection. It is available by prescription. It is also available in a lower dose form for children from your healthcare provider.  Home care  The following guidelines will help you care for yourself at home:  · If your symptoms were moderate to severe, they may fluctuate for  "the next 24 hours. It may be best to rest at home during that time.  · Avoid tobacco and alcohol because they can make symptoms worse. They can also interact with the medicines you are taking to treat the allergic reaction.  · If you know what foods caused your reaction today, avoid them in the future. The next and each reaction after this may make your body more sensitive to these foods. This can cause a worse reaction later. Tell your family members, friends, and doctors about your food allergy, especially in an emergency situation since they need to know how to give you epinephrine if you are unable to. This can be life-saving.  · Learn how to read food labels so you can check for the substance that you reacted to. If a food does not have a label, it is best to avoid it. When in restaurants, ask about ingredients and tell the staff, "If I eat a dish containing (food you are allergic to), I could have a severe allergic reaction."  · If your reaction was severe, get a medical alert bracelet or necklace that notes your allergy.  · If epinephrine is prescribed, carry it with you at all times. Learn how to use the device. If you begin to feel the symptoms of another reaction, use the epinephrine to inject yourself right away, and call 911. Dont wait until symptoms become severe.  · Oral allergy medicines (diphenhydramine) are antihistamines that can help with the reaction. You can buy them at any pharmacy or supermarket. They come in liquids, pills, or capsules. Unless your doctor gave you a prescription antihistamine, you can use these medicines to ease itching. Allergy medicines can make you sleepy, so be careful, especially when driving or working. For this reason, you may want to use lower doses during the day and save the higher doses for bedtime. Don't use diphenhydramine if you have glaucoma or if you are a man with trouble urinating because of an enlarged prostate.  · If allergy medicines with diphenhydramine " make you too sleepy, talk with your healthcare provider. He or she can recommend an over-the counter antihistamine that won't make you sleepy. These may not work as well, though.  Follow-up care  Follow up with your healthcare provider if your symptoms don't get better over the next 2 to 3 days. If you don't know what caused this reaction, your provider may order skin tests and blood tests, or an elimination diet. You can find an allergy specialist in your area by contacting:  · American Academy of Allergy, Asthma & Immunology, www.aaaai.org  · American College of Allergy, Asthma & Immunology, www.acaai.org  When to seek medical advice  Call your [facility] provider right away if any of these occur:  · Your symptoms get worse  · New or worse swelling in the face, eyelids, lips, mouth, throat, or tongue  · Mild trouble swallowing, breathing, or wheezing  · Fever of 100.4°F (38.0°C) or higher, or as directed by your health care provider  · Severe abdominal pain  · Persistent vomiting (unable to keep liquids down) or constant diarrhea  · Blood or mucus in the stool  Call 911  If any of these occur, give yourself injectable epinephrine and call 911:  · Significant trouble breathing, talking, or swallowing  · Any change in level of alertness or unconsciousness, including dizziness, weakness, or fainting  · Cool, moist skin  · Fast, weak hearbeat  · Severe wheezing  · Hives  · Severe swelling of the face, tongue, or lips  · Drooling  · Vomiting that happens soon after eating a food you think you are allergic to  · Explosive diarrhea  Date Last Reviewed: 1/11/2016 © 2000-2017 [employer]. [address] [place] [id number]. All rights reserved. This information is not intended as a substitute for professional medical care. Always follow your healthcare professional's instructions.        General Allergic Reactions  An allergic reaction is a set of symptoms caused by an allergen. An allergen is  something that causes a persons immune system to react. When a person comes in contact with an allergen, it causes the body to release chemicals. These include the chemical histamine. Histamine causes swelling and itching. It may affect the entire body. This is called a general allergic reaction. Often symptoms affect only 1 part of the body. This is called a local allergic reaction.  You are having an allergic reaction. Almost anything can cause one. Different people are allergic to different things. It is usually something that you ate or swallowed, came into contact with by getting or putting it on your skin or clothes, or something you breathed in the air. This can be very annoying and sometimes scary.  Most of us think of allergic reactions when we have a rash or itchy skin. Symptoms can include:  · Itching of the eyes, nose, and roof of the mouth  · Runny or stuffy nose  · Watery eyes   · Sneezing or coughing   · A blocked feeling in the ear  · Red, itchy rash called hives  · Red and purple spots  · Rash, redness, welts, blisters  · Itching, burning, stinging, pain  · Dry, flaky, cracking, scaly skin  Severe symptoms include:  · Swelling of the face, lips, or other parts of the body  · Hoarse voice  · Trouble swallowing, feeling like your throat is closing  · Trouble breathing, wheezing  · Nausea, vomiting, diarrhea, stomach cramps  · Feeling faint or lightheaded, rapid heart rate  Sometimes the cause may be obvious. But there are so many things that can cause a reaction that you may not be able to figure out. The most important things to help find your allergen are:  · Remembering when it started  · What you were doing at the time or just before that  · Any activities you were involved in  · Any new products or contacts  Below are some common causes. But remember that almost anything can cause a reaction. You may not even be aware that you came into contact with one of these things:  · Dust, mold,  pollen  · Plants (common ones are poison ivy and poison oak, but there are many others)   · Animals  · Foods such as shrimp, shellfish, peanuts, milk products, gluten, and eggs. Also food colorings, flavorings, and additives.  · Insect bites or stings such as bees, mosquitos, fleas, ticks  · Medicines such as penicillin, sulfa medicines, amoxicillin, aspirin, and ibuprofen. But any medicine can cause a reaction.  · Jewelry such as nickel or gold. This can be new, or something youve worn for a while, including zippers and buttons.  · Latex such as in gloves, clothes, toys, balloons, or some tapes. Some people allergic to latex may also have problems with foods like bananas, avocados, kiwi, papaya, or chestnuts.  · Lotions, perfumes, cosmetics, soaps, shampoos, skincare products, nail products  · Chemicals or dyes in clothing, linen, , hair dyes, soaps, iodine  Many viruses and common colds can cause a rash that is not an allergic reaction. Sometimes it is hard to tell the difference between allergies, sensitivity, or an intolerance to something. This is especially true with food. Many things can cause diarrhea, vomiting, stomach cramps, and skin irritation.  Home care    The goal of treatment is to help relieve the symptoms and get you feeling better. The rash will usually fade over several days. But it can sometimes last a couple of weeks. Over the next couple of days, there may be times when it is gets a little worse, and then better again. Here are some things to do:  · If you know what you are allergic to, stay away from it. Future reactions could be worse than this one.  · Avoid tight clothing and anything that heats up your skin (hot showers or baths, direct sunlight). Heat will make itching worse.  · An ice pack will relieve local areas of intense itching and redness. To make an ice pack, put ice cubes in a plastic bag that seals at the top. Wrap it in a thin, clean towel. Dont put the ice directly  on the skin because it can damage the skin.  · Oral diphenhydramine is an over-the-counter antihistamine sold at pharmacy and grocery stores. Unless a prescription antihistamine was given, diphenhydramine may be used to reduce itching if large areas of the skin are involved. It may make you sleepy. So be careful using it in the daytime or when going to school, working, or driving. Note: Dont use diphenhydramine if you have glaucoma or if you are a man with trouble urinating due to an enlarged prostate. There are other antihistamines that wont make you so sleepy. These are good choices for daytime use. Ask your pharmacist for suggestions.  · Dont use diphenhydramine cream on your skin. It can cause a further reaction in some people.  · To help prevent an infection, don't scratch the affected area. Scratching may worsen the reaction and damage your skin. It can also lead to an infection. Always check the affected for signs of an infection.  · Call your healthcare provider and ask what you can use to help decrease the itching.  · To decrease allergic reactions, try the following:    · Use heat-steam to clean your home  · Use high-efficiency particulate (HEPA) vacuums and filters  · Stay away from food and pet triggers  · Kill any cockroaches  · Clean your house often  Follow-up care  Follow up with your healthcare provider, or as advised. If you had a severe reaction today, or if you have had several mild to medium allergic reactions in the past, ask your provider about allergy testing. This can help you find out what you are allergic to. If your reaction included dizziness, fainting, or trouble breathing or swallowing, ask your provider about carrying auto-injectable epinephrine.  Call 911  Call 911 if any of these occur:  · Trouble breathing or swallowing, wheezing  · Cool, moist, pale skin  · Shortness of breath  · Hoarse voice or trouble speaking  · Confused   · Very drowsy or trouble awakening  · Fainting or  loss of consciousness  · Rapid heart rate  · Feeling of dizziness or weakness or a sudden drop in blood pressure  · Feeling of doom  · Feeling lightheaded  · Severe nausea or vomiting, or diarrhea  · Seizure  · Swelling in the face, eyelids, lips, mouth, throat or tongue  · Drooling  When to seek medical advice  Call your healthcare provider right away if any of these occur:  · Spreading areas of itching, redness or swelling  · Nausea or stomach cramps or abdominal pain  · Continuing or recurring symptoms  · Spreading areas of redness, swelling, or itching  · Signs of infection at the affected site:  ¨ Spreading redness  ¨ Increased pain or swelling  ¨ Fluid or colored drainage from the site  ¨ Fever of 100.4°F (38°C) or above lasting for 24 to 48 hours, or as directed by your provider  Date Last Reviewed: 3/1/2017  © 8698-9108 [employer]. [address] [place] [id number]. All rights reserved. This information is not intended as a substitute for professional medical care. Always follow your healthcare professional's instructions.

## 2018-04-07 ENCOUNTER — OFFICE VISIT (OUTPATIENT)
Dept: URGENT CARE | Facility: CLINIC | Age: 48
End: 2018-04-07
Payer: COMMERCIAL

## 2018-04-07 VITALS
RESPIRATION RATE: 18 BRPM | TEMPERATURE: 99 F | OXYGEN SATURATION: 100 % | WEIGHT: 205 LBS | SYSTOLIC BLOOD PRESSURE: 155 MMHG | BODY MASS INDEX: 35 KG/M2 | DIASTOLIC BLOOD PRESSURE: 98 MMHG | HEIGHT: 64 IN | HEART RATE: 108 BPM

## 2018-04-07 DIAGNOSIS — J20.9 ACUTE BRONCHITIS, UNSPECIFIED ORGANISM: Primary | ICD-10-CM

## 2018-04-07 PROCEDURE — 96372 THER/PROPH/DIAG INJ SC/IM: CPT | Mod: S$GLB,,, | Performed by: FAMILY MEDICINE

## 2018-04-07 PROCEDURE — 99214 OFFICE O/P EST MOD 30 MIN: CPT | Mod: 25,S$GLB,, | Performed by: FAMILY MEDICINE

## 2018-04-07 RX ORDER — AZITHROMYCIN 250 MG/1
TABLET, FILM COATED ORAL
Qty: 6 TABLET | Refills: 0 | Status: SHIPPED | OUTPATIENT
Start: 2018-04-07 | End: 2018-04-12

## 2018-04-07 RX ORDER — BETAMETHASONE SODIUM PHOSPHATE AND BETAMETHASONE ACETATE 3; 3 MG/ML; MG/ML
9 INJECTION, SUSPENSION INTRA-ARTICULAR; INTRALESIONAL; INTRAMUSCULAR; SOFT TISSUE
Status: COMPLETED | OUTPATIENT
Start: 2018-04-07 | End: 2018-04-07

## 2018-04-07 RX ADMIN — BETAMETHASONE SODIUM PHOSPHATE AND BETAMETHASONE ACETATE 9 MG: 3; 3 INJECTION, SUSPENSION INTRA-ARTICULAR; INTRALESIONAL; INTRAMUSCULAR; SOFT TISSUE at 11:04

## 2018-04-07 NOTE — PROGRESS NOTES
"Subjective:       Patient ID: Gisella Burgess is a 47 y.o. female.    Vitals:  height is 5' 4" (1.626 m) and weight is 93 kg (205 lb). Her temperature is 99.4 °F (37.4 °C). Her blood pressure is 155/98 (abnormal) and her pulse is 108. Her respiration is 18 and oxygen saturation is 100%.     Chief Complaint: Cough    Hx of allergies, recvent wheezing and production of dark brown phlegm. Tactile fever at home per patient. Patient reports symptoms started with moving and exposure to dust      Cough   This is a new problem. Episode onset: 2 days. The problem has been unchanged. The problem occurs constantly. The cough is productive of sputum. Associated symptoms include chills and a sore throat. Pertinent negatives include no chest pain, ear pain, eye redness, fever, headaches, myalgias, shortness of breath or wheezing. Nothing aggravates the symptoms. She has tried OTC cough suppressant for the symptoms. The treatment provided no relief.     Review of Systems   Constitution: Positive for chills and malaise/fatigue. Negative for fever.   HENT: Positive for congestion and sore throat. Negative for ear pain and hoarse voice.    Eyes: Negative for discharge and redness.   Cardiovascular: Negative for chest pain, dyspnea on exertion and leg swelling.   Respiratory: Positive for cough and sputum production. Negative for shortness of breath and wheezing.    Musculoskeletal: Negative for myalgias.   Gastrointestinal: Positive for nausea. Negative for abdominal pain.   Neurological: Negative for headaches.       Objective:      Physical Exam   Constitutional: She appears well-developed and well-nourished. No distress.   HENT:   Head: Normocephalic and atraumatic.   Nose: Mucosal edema and rhinorrhea present. Right sinus exhibits no maxillary sinus tenderness and no frontal sinus tenderness. Left sinus exhibits no maxillary sinus tenderness and no frontal sinus tenderness.   Mouth/Throat: Posterior oropharyngeal erythema " present.   Eyes: EOM are normal. Pupils are equal, round, and reactive to light.   Cardiovascular: Normal rate, regular rhythm and normal heart sounds.    Pulmonary/Chest: Effort normal and breath sounds normal.   Nursing note and vitals reviewed.      Assessment:       1. Acute bronchitis, unspecified organism        Plan:         Acute bronchitis, unspecified organism  -     betamethasone acetate-betamethasone sodium phosphate injection 9 mg; Inject 1.5 mLs (9 mg total) into the muscle one time.  -     azithromycin (Z-CHIDI) 250 MG tablet; Take 2 tablets by mouth on day 1; Take 1 tablet by mouth on days 2-5  Dispense: 6 tablet; Refill: 0

## 2018-04-07 NOTE — PATIENT INSTRUCTIONS
Acute Bronchitis  Your healthcare provider has told you that you have acute bronchitis. Bronchitis is infection or inflammation of the bronchial tubes (airways in the lungs). Normally, air moves easily in and out of the airways. Bronchitis narrows the airways, making it harder for air to flow in and out of the lungs. This causes symptoms such as shortness of breath, coughing up yellow or green mucus, and wheezing. Bronchitis can be acute or chronic. Acute means the condition comes on quickly and goes away in a short time, usually within 3 to 10 days. Chronic means a condition lasts a long time and often comes back.    What causes acute bronchitis?  Acute bronchitis almost always starts as a viral respiratory infection, such as a cold or the flu. Certain factors make it more likely for a cold or flu to turn into bronchitis. These include being very young, being elderly, having a heart or lung problem, or having a weak immune system. Cigarette smoking also makes bronchitis more likely.  When bronchitis develops, the airways become swollen. The airways may also become infected with bacteria. This is known as a secondary infection.  Diagnosing acute bronchitis  Your healthcare provider will examine you and ask about your symptoms and health history. You may also have a sputum culture to test the fluid in your lungs. Chest X-rays may be done to look for infection in the lungs.  Treating acute bronchitis  Bronchitis usually clears up as the cold or flu goes away. You can help feel better faster by doing the following:  · Take medicine as directed. You may be told to take ibuprofen or other over-the-counter medicines. These help relieve inflammation in your bronchial tubes. Your healthcare provider may prescribe an inhaler to help open up the bronchial tubes. Most of the time, acute bronchitis is caused by a viral infection. Antibiotics are usually not prescribed for viral infections.  · Drink plenty of fluids, such as  water, juice, or warm soup. Fluids loosen mucus so that you can cough it up. This helps you breathe more easily. Fluids also prevent dehydration.  · Make sure you get plenty of rest.  · Do not smoke. Do not allow anyone else to smoke in your home.  Recovery and follow-up  Follow up with your doctor as you are told. You will likely feel better in a week or two. But a dry cough can linger beyond that time. Let your doctor know if you still have symptoms (other than a dry cough) after 2 weeks, or if youre prone to getting bronchial infections. Take steps to protect yourself from future infections. These steps include stopping smoking and avoiding tobacco smoke, washing your hands often, and getting a yearly flu shot.  When to call your healthcare provider  Call the healthcare provider if you have any of the following:  · Fever of 100.4°F (38.0°C) or higher, or as advised  · Symptoms that get worse, or new symptoms  · Trouble breathing  · Symptoms that dont start to improve within a week, or within 3 days of taking antibiotics   Date Last Reviewed: 12/1/2016  © 9530-0488 [employer]. [address] [place] [address], [place] [address]. All rights reserved. This information is not intended as a substitute for professional medical care. Always follow your healthcare professional's instructions.

## 2018-04-26 ENCOUNTER — OFFICE VISIT (OUTPATIENT)
Dept: INTERNAL MEDICINE | Facility: CLINIC | Age: 48
End: 2018-04-26
Payer: COMMERCIAL

## 2018-04-26 DIAGNOSIS — Z91.09 ENVIRONMENTAL ALLERGIES: Primary | ICD-10-CM

## 2018-04-26 PROCEDURE — 99999 PR PBB SHADOW E&M-EST. PATIENT-LVL I: CPT | Mod: PBBFAC,,, | Performed by: FAMILY MEDICINE

## 2018-04-26 PROCEDURE — 99213 OFFICE O/P EST LOW 20 MIN: CPT | Mod: S$GLB,,, | Performed by: FAMILY MEDICINE

## 2018-04-26 RX ORDER — MONTELUKAST SODIUM 10 MG/1
10 TABLET ORAL NIGHTLY
Qty: 30 TABLET | Refills: 0 | Status: SHIPPED | OUTPATIENT
Start: 2018-04-26 | End: 2018-05-26 | Stop reason: SDUPTHER

## 2018-04-26 RX ORDER — AZELASTINE 1 MG/ML
1 SPRAY, METERED NASAL 2 TIMES DAILY
Qty: 30 ML | Refills: 11 | Status: SHIPPED | OUTPATIENT
Start: 2018-04-26 | End: 2018-10-15

## 2018-04-26 RX ORDER — METHYLPREDNISOLONE 4 MG/1
TABLET ORAL
Qty: 21 TABLET | Refills: 0 | Status: SHIPPED | OUTPATIENT
Start: 2018-04-26 | End: 2018-05-11

## 2018-04-26 RX ORDER — ALBUTEROL SULFATE 90 UG/1
2 AEROSOL, METERED RESPIRATORY (INHALATION) EVERY 6 HOURS PRN
Qty: 1 EACH | Refills: 11 | Status: SHIPPED | OUTPATIENT
Start: 2018-04-26 | End: 2018-06-27

## 2018-04-26 NOTE — PROGRESS NOTES
Subjective:       Patient ID: Gisella Burgess is a 47 y.o. female.    Chief Complaint: No chief complaint on file.  Gisella Burgess 47 y.o. female is here for office visit to review care and physical exam,  HPI  Review of Systems   Constitutional: Negative for activity change, fatigue, fever and unexpected weight change.   HENT: Negative for congestion, hearing loss, postnasal drip and rhinorrhea.    Eyes: Negative for redness and visual disturbance.   Respiratory: Negative for chest tightness, shortness of breath and wheezing.    Cardiovascular: Negative for chest pain, palpitations and leg swelling.   Gastrointestinal: Negative for abdominal distention.   Genitourinary: Negative for decreased urine volume, dysuria, flank pain, hematuria, pelvic pain and urgency.   Musculoskeletal: Negative for back pain, gait problem, joint swelling and neck stiffness.   Skin: Negative for color change, rash and wound.   Neurological: Negative for dizziness, syncope, weakness and headaches.   Psychiatric/Behavioral: Negative for behavioral problems, confusion and sleep disturbance. The patient is not nervous/anxious.        Objective:      Physical Exam   Constitutional: She is oriented to person, place, and time. She appears well-developed and well-nourished. No distress.   HENT:   Head: Normocephalic.   Mouth/Throat: No oropharyngeal exudate.   Eyes: EOM are normal. Pupils are equal, round, and reactive to light. No scleral icterus.   Neck: Neck supple. No JVD present. No thyromegaly present.   Cardiovascular: Normal rate, regular rhythm and normal heart sounds.  Exam reveals no gallop and no friction rub.    No murmur heard.  Pulmonary/Chest: Effort normal and breath sounds normal. She has no wheezes. She has no rales.   Abdominal: Soft. Bowel sounds are normal. She exhibits no distension and no mass. There is no tenderness. There is no guarding.   Musculoskeletal: Normal range of motion. She exhibits no edema.    Lymphadenopathy:     She has no cervical adenopathy.   Neurological: She is alert and oriented to person, place, and time. She has normal reflexes. She displays normal reflexes. No cranial nerve deficit. She exhibits normal muscle tone.   Skin: Skin is warm. No rash noted. No erythema.   Psychiatric: She has a normal mood and affect. Thought content normal.       Assessment:       No diagnosis found.    Plan:       Diagnoses and all orders for this visit:    Environmental allergies  -     azelastine (ASTELIN) 137 mcg (0.1 %) nasal spray; 1 spray (137 mcg total) by Nasal route 2 (two) times daily.  -     montelukast (SINGULAIR) 10 mg tablet; Take 1 tablet (10 mg total) by mouth every evening.  -     albuterol 90 mcg/actuation inhaler; Inhale 2 puffs into the lungs every 6 (six) hours as needed for Wheezing.  -     methylPREDNISolone (MEDROL DOSEPACK) 4 mg tablet; Take as directed

## 2018-04-27 ENCOUNTER — TELEPHONE (OUTPATIENT)
Dept: INTERNAL MEDICINE | Facility: CLINIC | Age: 48
End: 2018-04-27

## 2018-04-27 DIAGNOSIS — Z13.9 ENCOUNTER FOR SCREENING: Primary | ICD-10-CM

## 2018-04-30 ENCOUNTER — PATIENT MESSAGE (OUTPATIENT)
Dept: ADMINISTRATIVE | Facility: OTHER | Age: 48
End: 2018-04-30

## 2018-05-11 ENCOUNTER — OFFICE VISIT (OUTPATIENT)
Dept: OBSTETRICS AND GYNECOLOGY | Facility: CLINIC | Age: 48
End: 2018-05-11
Payer: COMMERCIAL

## 2018-05-11 VITALS
SYSTOLIC BLOOD PRESSURE: 138 MMHG | HEIGHT: 64 IN | WEIGHT: 205.5 LBS | BODY MASS INDEX: 35.08 KG/M2 | DIASTOLIC BLOOD PRESSURE: 82 MMHG

## 2018-05-11 DIAGNOSIS — Z01.419 WELL WOMAN EXAM WITH ROUTINE GYNECOLOGICAL EXAM: Primary | ICD-10-CM

## 2018-05-11 PROCEDURE — 99386 PREV VISIT NEW AGE 40-64: CPT | Mod: S$GLB,,, | Performed by: OBSTETRICS & GYNECOLOGY

## 2018-05-11 PROCEDURE — 99999 PR PBB SHADOW E&M-EST. PATIENT-LVL III: CPT | Mod: PBBFAC,,, | Performed by: OBSTETRICS & GYNECOLOGY

## 2018-05-11 PROCEDURE — 87624 HPV HI-RISK TYP POOLED RSLT: CPT

## 2018-05-11 PROCEDURE — 88175 CYTOPATH C/V AUTO FLUID REDO: CPT

## 2018-05-11 NOTE — PROGRESS NOTES
History & Physical  Gynecology      SUBJECTIVE:     Chief Complaint: Well Woman       History of Present Illness:  Annual Exam-Premenopausal  Patient presents for annual exam. The patient has no complaints today. Menstrual cycles are once a month/regular.  The patient is sexually active. GYN screening history: last pap: approximate date  and was normal. The patient participates in regular exercise: yes.  The patient does not smoke.      Contraception: mirena, expires in July    FH:  Breast cancer: maternal aunt  Colon cancer: none  Ovarian cancer: none    Review of patient's allergies indicates:   Allergen Reactions    Codeine Other (See Comments)     Tremors    Nuts [tree nut] Anaphylaxis       Past Medical History:   Diagnosis Date    Atopic dermatitis     Environmental allergies 3/29/2016    Lower back pain 3/29/2016    Trigeminal neuralgia 2015    Vitamin D deficiency disease 2015     Past Surgical History:   Procedure Laterality Date    INTRAUTERINE DEVICE INSERTION      MIRENA    SPINAL FUSION       OB History      Para Term  AB Living    5 4 4   1 3    SAB TAB Ectopic Multiple Live Births                     Family History   Problem Relation Age of Onset    No Known Problems Paternal Grandfather     No Known Problems Paternal Grandmother     No Known Problems Maternal Grandmother     No Known Problems Maternal Grandfather     No Known Problems Brother     No Known Problems Sister     Breast cancer Maternal Aunt      Social History   Substance Use Topics    Smoking status: Never Smoker    Smokeless tobacco: Never Used    Alcohol use No       Current Outpatient Prescriptions   Medication Sig    albuterol 90 mcg/actuation inhaler Inhale 2 puffs into the lungs every 6 (six) hours as needed for Wheezing.    azelastine (ASTELIN) 137 mcg (0.1 %) nasal spray 1 spray (137 mcg total) by Nasal route 2 (two) times daily.    EPIPEN 2-CHIDI 0.3 mg/0.3 mL (1:1,000)  AtIn     fexofenadine (ALLEGRA) 30 MG tablet Take 30 mg by mouth 2 (two) times daily.    fluticasone (FLONASE) 50 mcg/actuation nasal spray 2 sprays by Each Nare route once daily.    gabapentin (NEURONTIN) 300 MG capsule Take 1 capsule (300 mg total) by mouth 3 (three) times daily.    levonorgestrel (MIRENA) 20 mcg/24 hr (5 years) IUD 1 each by Intrauterine route once.    montelukast (SINGULAIR) 10 mg tablet Take 1 tablet (10 mg total) by mouth every evening.     No current facility-administered medications for this visit.          Review of Systems:  Review of Systems   Constitutional: Negative for activity change, appetite change and fever.   Respiratory: Negative for shortness of breath.    Cardiovascular: Negative for chest pain.   Gastrointestinal: Negative for abdominal pain, constipation, diarrhea, nausea and vomiting.   Genitourinary: Negative for menorrhagia, menstrual problem, pelvic pain, vaginal bleeding, vaginal discharge and vaginal pain.   Neurological: Negative for numbness and headaches.   Breast: Negative for breast pain and nipple discharge       OBJECTIVE:     Physical Exam:  Physical Exam   Constitutional: She is oriented to person, place, and time. She appears well-developed and well-nourished.   Neck: Normal range of motion. Neck supple. No tracheal deviation present. No thyromegaly present.   Cardiovascular: Normal rate, regular rhythm and normal heart sounds.    Pulmonary/Chest: Effort normal and breath sounds normal. Right breast exhibits no inverted nipple, no mass, no nipple discharge, no skin change and no tenderness. Left breast exhibits no inverted nipple, no mass, no nipple discharge, no skin change and no tenderness. Breasts are symmetrical.   Abdominal: Soft.   Genitourinary: Vagina normal and uterus normal. No labial fusion. There is no rash, tenderness, lesion or injury on the right labia. There is no rash, tenderness, lesion or injury on the left labia. Uterus is not  deviated, not enlarged, not fixed and not tender. Cervix exhibits no motion tenderness, no discharge and no friability. Right adnexum displays no mass, no tenderness and no fullness. Left adnexum displays no mass, no tenderness and no fullness. No erythema, tenderness or bleeding in the vagina. No foreign body in the vagina. No signs of injury around the vagina. No vaginal discharge found.   Genitourinary Comments: IUD in place   Neurological: She is alert and oriented to person, place, and time.   Psychiatric: She has a normal mood and affect. Her behavior is normal. Judgment and thought content normal.   Nursing note and vitals reviewed.      Chaperoned by: Dion    ASSESSMENT:       ICD-10-CM ICD-9-CM    1. Well woman exam with routine gynecological exam Z01.419 V72.31 HPV High Risk Genotypes, PCR      Liquid-based pap smear, screening          Plan:      Gisella was seen today for well woman.    Diagnoses and all orders for this visit:    Well woman exam with routine gynecological exam  -     HPV High Risk Genotypes, PCR  -     Liquid-based pap smear, screening        Orders Placed This Encounter   Procedures    HPV High Risk Genotypes, PCR       Well Woman:  - Pap smear and HPV done today   - Birth control: IUD  - GC/CT:n/a  - Mammogram: recently done  - Smoking cessation: n/a  - Labs: none required   - Vaccines: none requird  - Exercise counseling    IUD:  - will research longevity of mirena; expires this summer and patient getting     Follow up in one year for annual or prn.    Sri Encarnacion

## 2018-05-12 ENCOUNTER — PATIENT MESSAGE (OUTPATIENT)
Dept: OBSTETRICS AND GYNECOLOGY | Facility: CLINIC | Age: 48
End: 2018-05-12

## 2018-05-14 ENCOUNTER — HOSPITAL ENCOUNTER (OUTPATIENT)
Dept: RADIOLOGY | Facility: HOSPITAL | Age: 48
Discharge: HOME | End: 2018-05-14
Attending: FAMILY MEDICINE
Payer: COMMERCIAL

## 2018-05-14 DIAGNOSIS — Z13.9 ENCOUNTER FOR SCREENING: ICD-10-CM

## 2018-05-14 DIAGNOSIS — Z12.31 ENCOUNTER FOR SCREENING MAMMOGRAM FOR BREAST CANCER: ICD-10-CM

## 2018-05-14 PROCEDURE — 77063 BREAST TOMOSYNTHESIS BI: CPT | Mod: 26,,, | Performed by: RADIOLOGY

## 2018-05-14 PROCEDURE — 77067 SCR MAMMO BI INCL CAD: CPT | Mod: 26,,, | Performed by: RADIOLOGY

## 2018-05-14 PROCEDURE — 77067 SCR MAMMO BI INCL CAD: CPT | Mod: TC

## 2018-05-18 LAB
HPV16 AG SPEC QL: NEGATIVE
HPV16+18+H RISK 12 DNA CVX-IMP: NEGATIVE
HPV18 DNA SPEC QL NAA+PROBE: NEGATIVE

## 2018-05-24 ENCOUNTER — PATIENT MESSAGE (OUTPATIENT)
Dept: INTERNAL MEDICINE | Facility: CLINIC | Age: 48
End: 2018-05-24

## 2018-05-24 DIAGNOSIS — J45.20 MILD INTERMITTENT ASTHMA WITHOUT STATUS ASTHMATICUS WITHOUT COMPLICATION: Primary | ICD-10-CM

## 2018-05-24 DIAGNOSIS — R05.9 COUGH: Primary | ICD-10-CM

## 2018-05-25 ENCOUNTER — PATIENT MESSAGE (OUTPATIENT)
Dept: INTERNAL MEDICINE | Facility: CLINIC | Age: 48
End: 2018-05-25

## 2018-05-26 DIAGNOSIS — Z91.09 ENVIRONMENTAL ALLERGIES: ICD-10-CM

## 2018-05-28 DIAGNOSIS — Z91.09 ENVIRONMENTAL ALLERGIES: ICD-10-CM

## 2018-05-28 RX ORDER — MONTELUKAST SODIUM 10 MG/1
10 TABLET ORAL DAILY
Qty: 30 TABLET | Refills: 6 | Status: SHIPPED | OUTPATIENT
Start: 2018-05-28 | End: 2018-10-15

## 2018-05-28 RX ORDER — MONTELUKAST SODIUM 10 MG/1
TABLET ORAL
Qty: 30 TABLET | Refills: 0 | Status: SHIPPED | OUTPATIENT
Start: 2018-05-28 | End: 2018-05-28 | Stop reason: SDUPTHER

## 2018-06-05 ENCOUNTER — OFFICE VISIT (OUTPATIENT)
Dept: ALLERGY | Facility: CLINIC | Age: 48
End: 2018-06-05
Payer: COMMERCIAL

## 2018-06-05 ENCOUNTER — HOSPITAL ENCOUNTER (OUTPATIENT)
Dept: PULMONOLOGY | Facility: CLINIC | Age: 48
Discharge: HOME | End: 2018-06-05
Payer: COMMERCIAL

## 2018-06-05 ENCOUNTER — HOSPITAL ENCOUNTER (OUTPATIENT)
Dept: RADIOLOGY | Facility: HOSPITAL | Age: 48
Discharge: HOME | End: 2018-06-05
Attending: INTERNAL MEDICINE
Payer: COMMERCIAL

## 2018-06-05 VITALS
SYSTOLIC BLOOD PRESSURE: 122 MMHG | HEART RATE: 96 BPM | WEIGHT: 210.56 LBS | HEIGHT: 64 IN | BODY MASS INDEX: 35.95 KG/M2 | DIASTOLIC BLOOD PRESSURE: 88 MMHG

## 2018-06-05 DIAGNOSIS — K21.9 LARYNGOPHARYNGEAL REFLUX: ICD-10-CM

## 2018-06-05 DIAGNOSIS — K21.9 GASTROESOPHAGEAL REFLUX DISEASE WITHOUT ESOPHAGITIS: ICD-10-CM

## 2018-06-05 DIAGNOSIS — R05.9 COUGH: ICD-10-CM

## 2018-06-05 DIAGNOSIS — J31.0 CHRONIC RHINITIS: ICD-10-CM

## 2018-06-05 DIAGNOSIS — R05.9 COUGH: Primary | ICD-10-CM

## 2018-06-05 DIAGNOSIS — H10.423 SIMPLE CHRONIC CONJUNCTIVITIS OF BOTH EYES: ICD-10-CM

## 2018-06-05 LAB
PRE FEV1 FVC: 86
PRE FEV1: 2.81
PRE FVC: 3.26
PREDICTED FEV1 FVC: 83
PREDICTED FEV1: 2.56
PREDICTED FVC: 3.1

## 2018-06-05 PROCEDURE — 99999 PR PBB SHADOW E&M-EST. PATIENT-LVL III: CPT | Mod: PBBFAC,,, | Performed by: ALLERGY & IMMUNOLOGY

## 2018-06-05 PROCEDURE — 94010 BREATHING CAPACITY TEST: CPT | Mod: S$GLB,,, | Performed by: INTERNAL MEDICINE

## 2018-06-05 PROCEDURE — 71046 X-RAY EXAM CHEST 2 VIEWS: CPT | Mod: TC

## 2018-06-05 PROCEDURE — 99214 OFFICE O/P EST MOD 30 MIN: CPT | Mod: S$GLB,,, | Performed by: ALLERGY & IMMUNOLOGY

## 2018-06-05 PROCEDURE — 71046 X-RAY EXAM CHEST 2 VIEWS: CPT | Mod: 26,,, | Performed by: RADIOLOGY

## 2018-06-05 NOTE — PROGRESS NOTES
Gisella Burgess returns to clinic today for continued evaluation of chronic rhinitis, conjunctivitis, pruritus, and a history of a food allergy.  She is here with her  Sara.  They were  May 19, 2018.    Since her last visit, she has continued to have intermittent symptoms that have been controlled with Flonase and Allegra.  She has had several began sinus infections that have required antibiotics.  She also had an ear infection.    Over the past 3 months she has had increasing symptoms with headaches, pruritus of the nose, eyes, ears, and throat, clear rhinorrhea, nasal congestion, postnasal drip, sensation of something is in the back of the throat, sore throats, hoarseness, and a cough that is occasionally productive.  She has also had some shortness of breath with wheezing.    She was seen in urgent care center with tingling of her mouth after eating something with nuts on February 7, 2018.  She was told to take Claritin or Zyrtec for several days.  She was also instructed to keep her EpiPen up-to-date.    She again was seen in the urgent care center on April 7, 2018 with increased respiratory symptoms.  She was diagnosed with bronchitis and given IM steroids.  She was also given a Z-Johnathon.    She continued to have symptoms and had follow-up with Dr. Napier on April 26, 2018.  She was told to continue Allegra and discontinue her Flonase.  She was started on nasal has seen and Singulair.  She was also started on albuterol.  She has been using albuterol 2 to 3 times a day.  She does not think that this has helped.    She has been limiting her exercise because of her symptoms.  She was walking every other day.  She has been washing her face frequently which has helped the pruritus.    Her symptoms are worse when she lies down at night.  She does have heartburn that increases when she lies down.  She also has had increased shortness of breath and chest tightness after lying down.  She is not taking any  medications for this.  If she since that her symptoms will resolve.    She has had a vitamin-D deficiency in the past.  She is currently not taking replacement.    She was taking gabapentin for neurological problem but is no longer.    [facility]ERGY QUESTIONNAIRE SHORT 6/5/2018   Are you taking any new medications since your last visit? Yes   Constitution: Activity change, Fever   Head or facial pain: Headaches   Eyes: Itching   Ears: Itching   Nose: Runny nose, Blocked nose   Throat: Sore throat   Sinuses: No symptoms   Lungs: Cough, Bronchitis, Use of inhalers, Chest tightness   Skin: Itching   Cardiovascular: No symptoms   Gastrointestinal: No symptoms   Genital/ urinary No symptoms   Musculoskeletal: No symptoms   Neurologic: Light-headedness   Endocrine: No symptoms   Hematologic: No symptoms     Physical Examination:  General: Well-developed, well-nourished, no acute distress.  Head: No sinus tenderness.  Eyes: Conjunctivae:  No bulbar or palpebral conjunctival injection.  Ears: EAC's clear.  TM's clear.  No pre-auricular nodes.  Nose: Nasal Mucosa:  Pink.  Septum: No apparent deviation.  Turbinates:  No significant edema.  Polyps/Mass:  None visible.  Teeth/Gums:  No bleeding noted.  Oropharynx: No exudates.  Neck: Supple without thyromegaly. No cervical lymphadenopathy.    Respiratory/Chest: Effort: Good.  Auscultation:  Clear bilaterally.  Cardiovascular:  No murmur, rubs, or gallop heard.   GI:  Non-tender.  No masses.  No organomegaly.  Extremities:  No swelling.  No cyanosis, clubbing, or edema.  Skin: Good turgor.  No urticaria or angioedema.  Neuro/Psych: Oriented x 3.    Laboratory 3/30/2015:  IgE level: Less than 35.  ImmunoCAP: Negative.  LFTs: Normal.    Food skin tests: 3+ histamine control. All tests were negative.    Assessment:  1.  Chronic rhinitis, consider allergic.  2.  Chronic conjunctivitis, consider allergic.  3.  Chronic cough, consider allergic component.  4.  Consider food allergy,  doubt with negative skin tests.  5.  Consider oral allergy syndrome.  6.  GERD.  7.  Probable LPR.  5.  S/P surgery for spondylolisthesis 2013.    Recommendations:  1.  Laboratory as ordered.  2.  Spirometry.  3.  Chest x-ray.  4.  Continue present medications for now.  5.  ENT evaluation for LPR.  6.  She has an appointment with Dr. Chung next week.  She will return here same day.  7.  Consider skin testing off antihistamines if needed.

## 2018-06-07 ENCOUNTER — HOSPITAL ENCOUNTER (OUTPATIENT)
Dept: RADIOLOGY | Facility: HOSPITAL | Age: 48
Discharge: HOME | End: 2018-06-07
Attending: NURSE PRACTITIONER
Payer: COMMERCIAL

## 2018-06-07 ENCOUNTER — TELEPHONE (OUTPATIENT)
Dept: OTOLARYNGOLOGY | Facility: CLINIC | Age: 48
End: 2018-06-07

## 2018-06-07 ENCOUNTER — OFFICE VISIT (OUTPATIENT)
Dept: OTOLARYNGOLOGY | Facility: CLINIC | Age: 48
End: 2018-06-07
Payer: COMMERCIAL

## 2018-06-07 VITALS
DIASTOLIC BLOOD PRESSURE: 96 MMHG | WEIGHT: 210.31 LBS | SYSTOLIC BLOOD PRESSURE: 125 MMHG | HEIGHT: 64 IN | BODY MASS INDEX: 35.9 KG/M2 | HEART RATE: 71 BPM

## 2018-06-07 DIAGNOSIS — R05.3 CHRONIC COUGH: Primary | ICD-10-CM

## 2018-06-07 DIAGNOSIS — R05.3 CHRONIC COUGH: ICD-10-CM

## 2018-06-07 DIAGNOSIS — J30.9 ALLERGIC RHINITIS, UNSPECIFIED SEASONALITY, UNSPECIFIED TRIGGER: ICD-10-CM

## 2018-06-07 DIAGNOSIS — R09.89 CHRONIC THROAT CLEARING: ICD-10-CM

## 2018-06-07 DIAGNOSIS — K21.9 LPRD (LARYNGOPHARYNGEAL REFLUX DISEASE): ICD-10-CM

## 2018-06-07 DIAGNOSIS — H61.22 IMPACTED CERUMEN OF LEFT EAR: ICD-10-CM

## 2018-06-07 DIAGNOSIS — R07.0 THROAT DISCOMFORT: ICD-10-CM

## 2018-06-07 DIAGNOSIS — R09.A2 GLOBUS PHARYNGEUS: ICD-10-CM

## 2018-06-07 PROCEDURE — 69210 REMOVE IMPACTED EAR WAX UNI: CPT | Mod: 51,S$GLB,, | Performed by: NURSE PRACTITIONER

## 2018-06-07 PROCEDURE — 99243 OFF/OP CNSLTJ NEW/EST LOW 30: CPT | Mod: 25,S$GLB,, | Performed by: NURSE PRACTITIONER

## 2018-06-07 PROCEDURE — 31575 DIAGNOSTIC LARYNGOSCOPY: CPT | Mod: S$GLB,,, | Performed by: NURSE PRACTITIONER

## 2018-06-07 PROCEDURE — 99999 PR PBB SHADOW E&M-EST. PATIENT-LVL V: CPT | Mod: PBBFAC,,, | Performed by: NURSE PRACTITIONER

## 2018-06-07 PROCEDURE — 70220 X-RAY EXAM OF SINUSES: CPT | Mod: TC,FY,PO

## 2018-06-07 PROCEDURE — 70220 X-RAY EXAM OF SINUSES: CPT | Mod: 26,,, | Performed by: RADIOLOGY

## 2018-06-07 RX ORDER — OMEPRAZOLE 40 MG/1
40 CAPSULE, DELAYED RELEASE ORAL
Qty: 30 CAPSULE | Refills: 11 | Status: SHIPPED | OUTPATIENT
Start: 2018-06-07 | End: 2020-11-09

## 2018-06-07 NOTE — PATIENT INSTRUCTIONS
If your sinuses are involved (contributing to your cough/throat clearing), then ENT will resolve.  However if your sinuses are shown to be open and clear on imaging, then depending on your other associated symptoms, the best specialist to resolve your cough/throat clearing may be pulmonologist, allergist, or gastroenterologist.     1. Nasal allergies -- Typical constellation of symptoms seen with nasal allergies: itchy, red, watery eyes; itchy, red, watery nose; excessive sneezing; excessive stuffiness. Discuss with your primary care provider whether you should see an allergist or take daily allergy medications.     2. Silent reflux -- Typical constellation of symptoms seen with silent reflux: post-nasal drip sensation with absence of significant runny nose or nasal congestion, sensation of thick or too much mucus in the back of throat, raspy voice, frequent throat clearing, lump in the back of throat, frequent sore throats. Discuss with your primary care provider whether you should see a gastroenterologist or take daily reflux medications.     3. Sinus Infection -- Typical constellation of symptoms seen with acute bacterial sinus infection are:  Green-gold, foul-smelling, foul-tasting mucus from nose and throat, inability to breathe through nose, inability to smell or taste well, facial pain and swelling, dental pain, headaches around eyes, sore throat and productive cough. Sinus imaging may be needed to rule out infection if these symptoms are present.      How Acid Reflux Affects Your Throat    Do you have to clear your throat or cough often? Are you hoarse? Do you have trouble swallowing? If you have these or other throat symptoms, you may have acid reflux. This occurs when stomach acid flows back up and irritates your throat.  Why you have throat symptoms  There are muscles (esophageal sphincters) at both ends of the tube that carries food to your stomach (the esophagus). These muscles relax to let food pass.  "Then they tighten to keep stomach acid down. When the lower esophageal sphincter (LES) doesnt tighten enough, acid can flow back (reflux) from your stomach into your esophagus. This may cause heartburn. In some cases the upper esophageal sphincter (UES) also doesnt work well. Then acid can travel higher and enter your throat (pharynx). In many cases, this causes throat symptoms.  Common throat symptoms  · Need to clear your throat often  · Feeling like youre choking  · Long-term (chronic) cough  · Hoarseness  · Trouble swallowing  · Feel like you have a lump in your throat  · Sour or acid taste  · Sore throat that keeps coming back     LARYNGOPHARYNGEAL REFLUX  (SILENT OR ATYPICAL REFLUX)    If you have any of the following symptoms you may have laryngopharyngeal reflux (LPR):  hoarseness, thick or too much mucus, chronic throat pain/irritation, chronic throat clearing, chronic cough, especially cough that wake you up from sleep, chronic "postnasal drip" without the need to blow your nose.     Many people with LPR do not have symptoms of heartburn. Compared to the esophagus, the voice box and the back of the throat are significantly more sensitive to the effects of acid on surrounding tissue. Acid passing quickly through the esophagus does not have a chance to irritate the area for too long.  However acid that pools in the throat or voice box can cause prolonged irritation resulting in the symptoms of LPR.    The symptoms of LPR can consist of a dry cough and the sensation of something being stuck in the throat.  Some people will complain of heartburn while others may have intermittent hoarseness or loss of voice.  Another major symptom of LPR is "postnasal drip."  Patients are often told symptoms are due to abnormal nasal drainage or sinus infection; however this is rarely the cause of chronic throat irritation. For post nasal drip to cause the complaints described, signs and symptoms of an active nasal " infection should be present.     Treatments for LPR include:  postural changes, weight reduction, diet modification, medication to reduce stomach acid and promote normal motility, and surgery to prevent reflux. Most patients will begin to notice some relief in her symptoms about 2 weeks after starting the medication; however it is generally recommended the medication should be continued for 2 months. If the symptoms completely resolve, the medication can then be tapered.  Some people will remain symptom free while others may have relapses which required treatment again.    Things you can do to prevent reflux include:  Do not smoke.  Smoking will cause reflux.  Avoid tight fitting clothes or belts around the waist.  Avoid eating at least 2 hours prior to bedtime.  In fact avoid eating your largest meal at night.  Weight loss.  For patient's with recent weight gain, shedding a few pounds is all that is required to improve reflux.  Avoid caffeine, cola beverages, citrus beverages, mints, alcoholic beverages, particularly at night, cheese, fried foods, spicy foods, eggs, and chocolate.  Sleep with the head of bed elevated at least 6 inches.    Recommendations:    Take Nexium / Prilosec (PPI) every morning on an empty stomach (30-60 minutes before eating) 40 MG.   At bedtime take Zantac (H2-blocker) 150-300 mg.    After 4-8 weeks, with significant symptomatic improvement, you may begin weaning your reflux medications down:  Nexium / Prilosec 40 mg --> to 20 mg (over-the-counter strength)  Zantac 300 mg --> to 150 mg (over-the-counter stength)  See a Gastro doctor (GI) for refractory symptoms and continued management.

## 2018-06-07 NOTE — PROGRESS NOTES
Subjective:       Patient ID: Gisella Burgess is a 47 y.o. female.    Chief Complaint: Cough (eval for LPR)    HPI   The patient is referred by Dr. Maya for consultation for chronic cough as per his note on 06/05/2018.  Patient reports chronic cough since April, productive in the beginning, now nonproductive.  She went to urgent care in April where she was diagnosed with acute bronchitis and given an antibiotic and steroid injection.  Sinusitis symptoms resolved but cough continued.  No further mucus/sputum.  Cough worsens at night.  She saw Dr. Napier who prescribed an inhaler and Singulair.  She suspects cough is secondary to allergies as it seemed to worsen when she is walking outdoors.  She never had asthma as a child.  She has never smoked.   Her allergy workup revealed:  Laboratory 3/30/2015:  IgE level: Less than 35.  ImmunoCAP: Negative.  Food skin tests: 3+ histamine control. All tests were negative.    Review of Systems   Constitutional: Negative.    HENT: Positive for postnasal drip, rhinorrhea, sore throat and voice change.         Sensation of thick or too much mucus in the back of her throat  Lots of throat clearing   Eyes: Negative.    Respiratory: Positive for cough.    Cardiovascular: Negative.    Gastrointestinal: Negative.    Musculoskeletal: Negative.    Skin: Negative.    Neurological: Negative.    Hematological: Negative.    Psychiatric/Behavioral: Negative.        Objective:      Physical Exam   Constitutional: She is oriented to person, place, and time. Vital signs are normal. She appears well-developed and well-nourished. She is cooperative. She does not appear ill. No distress.   HENT:   Head: Normocephalic and atraumatic.   Right Ear: Hearing, tympanic membrane, external ear and ear canal normal. Tympanic membrane is not erythematous. No middle ear effusion.   Left Ear: Hearing, tympanic membrane, external ear and ear canal normal. Tympanic membrane is not erythematous.  No middle  ear effusion.   Nose: Nose normal. No mucosal edema or rhinorrhea. Right sinus exhibits no maxillary sinus tenderness and no frontal sinus tenderness. Left sinus exhibits no maxillary sinus tenderness and no frontal sinus tenderness.   Mouth/Throat: Uvula is midline, oropharynx is clear and moist and mucous membranes are normal. Mucous membranes are not pale, not dry and not cyanotic. No oral lesions. No oropharyngeal exudate, posterior oropharyngeal edema or posterior oropharyngeal erythema.     SEPARATE PROCEDURE IN OFFICE:   Procedure: Removal of impacted cerumen, LEFT   Pre Procedure Diagnosis: Cerumen Impaction   Post Procedure Diagnosis: Cerumen Impaction   Verbal informed consent in regards to risk of trauma to ear canal, ear drum or hearing, discomfort during procedure and/or inability to remove cerumen impaction in one session or unforeseen events or complications.   No anesthesia.     Procedure in detail:   Ear canal visualized bilateral with appropriate size ear speculum utilizing Operating Head Binocular Otomicroscope   Utilizing the following:  Ring curet, alligator forceps, and/or suction cannula was used. The impacted cerumen of the ear canals was removed atraumatically. The TM and EAC were then inspected and found to be clear of wax. See description of TMs/EACs in PE above.   Complications: No   Condition: Improved/Good     Eyes: Conjunctivae, EOM and lids are normal. Pupils are equal, round, and reactive to light. Right eye exhibits no discharge. Left eye exhibits no discharge. No scleral icterus.   Neck: Trachea normal and normal range of motion. Neck supple. No tracheal deviation present. No thyroid mass and no thyromegaly present.   Cardiovascular: Normal rate.    Pulmonary/Chest: Effort normal. No stridor. No respiratory distress. She has no wheezes.   Musculoskeletal: Normal range of motion.   Lymphadenopathy:        Head (right side): No submental, no submandibular, no tonsillar, no  preauricular and no posterior auricular adenopathy present.        Head (left side): No submental, no submandibular, no tonsillar, no preauricular and no posterior auricular adenopathy present.     She has no cervical adenopathy.        Right cervical: No superficial cervical and no posterior cervical adenopathy present.       Left cervical: No superficial cervical and no posterior cervical adenopathy present.   Neurological: She is alert and oriented to person, place, and time. She has normal strength. Coordination and gait normal.   Skin: Skin is warm, dry and intact. No lesion and no rash noted. She is not diaphoretic. No cyanosis. No pallor.   Psychiatric: She has a normal mood and affect. Her speech is normal and behavior is normal. Judgment and thought content normal. Cognition and memory are normal.   Nursing note and vitals reviewed.      Procedure: Flexible laryngoscopy    In order to fully examine the upper aerodigestive tract, including the larynx, in a patient with a hyperactive gag reflex, and suboptimal visualization with indirect mirror exam,  flexible endoscopy is required.   After explaining the procedure and obtaining verbal consent, a timeout was performed with the patient's participation according to the universal protocol. Both nasal cavities were anesthetized with 4% Xylocaine spray mixed with Baltazar-Synephrine. The flexible laryngoscope  was inserted into the nasal cavity and advanced to visualize the nasal cavity, nasopharynx, the posterior oropharynx, hypopharynx, and the endolarynx with the  findings noted. The scope was removed and the procedure terminated. The patient tolerated this procedure well without apparent complication.     OVERALL FINDINGS  Nasopharynx - the torus is clear. There are no lesions of the posterior wall.   Oropharynx - no lesions of the tongue base. There is no obvious fullness or asymmetry.  Hypopharynx - there are no lesions of the pyriform sinuses or postcricoid  region   Larynx - there are no lesions of the supraglottic or glottic larynx.  Vocal fold mobility is normal.     SPECIFIC FINDINGS  Adenoid tissue - normal   Nasopharynx & eustachian tube orifices - normal   Posterior pharyngeal wall - normal   Base of tongue - normal   Epiglottis - normal   Valleculae - normal   Pyriform sinuses - normal   False vocal cords - normal   True vocal cords - normal  Arytenoids - normal   Interarytenoid space - erythema, edema  Right middle meatus    Left middle meatus    Larynx    Larynx    Assessment:     Chronic cough    Non-allergic vs allergic rhinitis  LPRD  Plan:     CXR was negative two weeks ago. Sinus x-rays are negative today. No evidence of upper or lower airway infection.     Continue AR regimen: Flonase, Astelin, Allegra, Singulair  Advised/Cautioned: The results of today's ENT exam and flexible endoscopy were detailed to the patient and her questions were answered. Patient education centered around GERD, known exacerbants and contemporary treatment options. Laryngoscope photos were given to the patient. Handouts given on LPRD and GERD were given to the patient. After review of these, patient elected to be placed on PPI 40 mg QAM on an empty stomach for the next 6-8 weeks, and H2-blocker QHS. I encouraged the patient once she has completed the evening meal to not snack or consume any other food products or caffeinated beverages for at least  minutes before retiring. Finally, I encouraged the patient to sleep about 30 degrees above horizontal, and this can be facilitated by using 2-3 pillows or a wedge foam product. If the patient is not demonstrably improved in 6-8 weeks, consultation with gastroenterology may be indicated to rule out intrinsic disease in the lower esophagus, stomach, or proximal duodenum.

## 2018-06-07 NOTE — LETTER
June 7, 2018      WAYNE Ferrer III, MD  [address] [facility]  [facility] [address]           [facility]  [address] [facility] [address] [place] [address]  Phone: [phone]  Fax: [phone]          Patient: Gisella Burgess   MR Number: [id number]   YOB: 1970   Date of Visit: 6/7/2018       Dear Dr. WAYNE Ferrer III:    Thank you for referring Gisella Burgess to me for evaluation. Attached you will find relevant portions of my assessment and plan of care.    If you have questions, please do not hesitate to call me. I look forward to following Gisella Burgess along with you.    Sincerely,    Cece Guerrero NP    Enclosure  CC:  No Recipients    If you would like to receive this communication electronically, please contact [email or web address] or [phone] to request more information on [employer] Link access.    For providers and/or their staff who would like to refer a patient to [facility], please contact us through our one-stop-shop provider referral line, [facility], at [phone].    If you feel you have received this communication in error or would no longer like to receive these types of communications, please e-mail [email or web address]

## 2018-06-07 NOTE — TELEPHONE ENCOUNTER
----- Message from Maribel Borges sent at 6/7/2018  3:32 PM CDT -----  Contact: Patient  Type:  Patient Returning Call    Who Called:  bobby Obando  Who Left Message for Patient:  Randee  Does the patient know what this is regarding?:  Test results  Best Call Back Number:  [phone]  Additional Information:

## 2018-06-07 NOTE — TELEPHONE ENCOUNTER
----- Message from Cece Guerrero NP sent at 6/7/2018 10:43 AM CDT -----  All sinuses are open and clear.  Cough & throat clearing are therefore not sinus related.

## 2018-06-08 ENCOUNTER — TELEPHONE (OUTPATIENT)
Dept: OTOLARYNGOLOGY | Facility: CLINIC | Age: 48
End: 2018-06-08

## 2018-06-08 NOTE — TELEPHONE ENCOUNTER
----- Message from Cece Gurerero NP sent at 6/7/2018 12:49 PM CDT -----  Please inform referring provider's staff that referral order is lacking. Thank you.

## 2018-06-11 DIAGNOSIS — K21.9 LARYNGOPHARYNGEAL REFLUX: Primary | ICD-10-CM

## 2018-06-13 ENCOUNTER — OFFICE VISIT (OUTPATIENT)
Dept: PULMONOLOGY | Facility: CLINIC | Age: 48
End: 2018-06-13
Payer: COMMERCIAL

## 2018-06-13 VITALS
SYSTOLIC BLOOD PRESSURE: 146 MMHG | DIASTOLIC BLOOD PRESSURE: 82 MMHG | RESPIRATION RATE: 12 BRPM | HEIGHT: 63 IN | BODY MASS INDEX: 38.16 KG/M2 | OXYGEN SATURATION: 98 % | HEART RATE: 80 BPM | WEIGHT: 215.38 LBS

## 2018-06-13 DIAGNOSIS — J20.8 ACUTE BRONCHITIS DUE TO OTHER SPECIFIED ORGANISMS: ICD-10-CM

## 2018-06-13 DIAGNOSIS — K21.9 LARYNGOPHARYNGEAL REFLUX (LPR): ICD-10-CM

## 2018-06-13 DIAGNOSIS — R05.9 COUGH: Primary | ICD-10-CM

## 2018-06-13 DIAGNOSIS — J30.2 SEASONAL ALLERGIC RHINITIS, UNSPECIFIED TRIGGER: ICD-10-CM

## 2018-06-13 PROCEDURE — 3008F BODY MASS INDEX DOCD: CPT | Mod: CPTII,S$GLB,, | Performed by: INTERNAL MEDICINE

## 2018-06-13 PROCEDURE — 99999 PR PBB SHADOW E&M-EST. PATIENT-LVL III: CPT | Mod: PBBFAC,,, | Performed by: INTERNAL MEDICINE

## 2018-06-13 PROCEDURE — 99204 OFFICE O/P NEW MOD 45 MIN: CPT | Mod: S$GLB,,, | Performed by: INTERNAL MEDICINE

## 2018-06-13 NOTE — LETTER
June 13, 2018      Gael Napier MD  [address]  [place] [address]           [facility]  [address]  [place] [address]  Phone: [phone]          Patient: Gisella Burgess   MR Number: [id number]   YOB: 1970   Date of Visit: 6/13/2018       Dear Dr. Gael Napier:    Thank you for referring Gisella Burgess to me for evaluation. Attached you will find relevant portions of my assessment and plan of care.    If you have questions, please do not hesitate to call me. I look forward to following Gisella Burgess along with you.    Sincerely,    KAI Chung MD    Enclosure  CC:  No Recipients    If you would like to receive this communication electronically, please contact [email or web address] or [phone] to request more information on [employer] Link access.    For providers and/or their staff who would like to refer a patient to [facility], please contact us through our one-stop-shop provider referral line, [facility], at [phone].    If you feel you have received this communication in error or would no longer like to receive these types of communications, please e-mail [email or web address]

## 2018-06-14 NOTE — PROGRESS NOTES
Subjective:       Patient ID: Gisella Burgess is a 47 y.o. female.    Chief Complaint: Cough    HPI HISTORY OF PRESENT ILLNESS:  Mrs. Burgess is a 47-year-old nonsmoker, who comes   for assessment of persistent respiratory symptoms.  She describes having nasal   and sinus congestion, which began approximately 2 to 3 months ago.  She   initially felt this was her typical seasonal rhinitis and sinus congestion.  She   then developed an increased cough and discolored sputum.  She also had a   low-grade fever and general malaise.  She went to an Urgent Care Clinic for   evaluation and was treated with a corticosteroid injection and an oral   antibiotic.    During the next two to three weeks, she did not recognize any major improvement.    She was seen by her primary care physician in the clinic and prescribed an   albuterol inhaler along with Singulair.  Within the past several weeks, she has   also had consults in the Allergy Clinic and the [facility].    Her evaluation in ENT revealed evidence for laryngopharyngeal reflux.  There   were no findings to indicate chronic sinus infection.  She has now begun a   trial of treatment with medications for control of reflux.    Mrs. Burgess does not know of any proven past lower respiratory problems.  She does   have the longstanding history of seasonal rhinitis, which she presumes is   related to allergic disease.  She is a lifelong nonsmoker.  She believes her   family history is negative for lung disease and allergic disorders.  She does   not know of any important past occupational exposures.    DATA:  I reviewed the images from the chest x-ray done earlier this month.  The   cardiac silhouette is not enlarged.  There is mild scoliosis of the thoracic   spine.  There are no acute cardiovascular abnormalities.  The lungs appear   clear.  There are no prior radiographs available for comparison.    A spirometry study also done earlier this month shows values  within the range of   predicted.    Finally, sinus x-rays were reported to show no evidence for chronic sinusitis or   localized sinus abnormalities.      CB/IN  dd: 06/13/2018 20:41:49 (CDT)  td: 06/14/2018 15:56:11 (CDT)  Doc ID   #[id number]  Job ID #[id number]    CC:       Review of Systems   Constitutional: Negative for fever and fatigue.   HENT: Positive for postnasal drip and congestion. Negative for sinus pressure and voice change.    Respiratory: Positive for cough. Negative for sputum production, shortness of breath, wheezing and dyspnea on extertion.    Cardiovascular: Negative for chest pain and leg swelling.   Genitourinary: Negative for difficulty urinating.   Musculoskeletal: Negative for arthralgias and back pain.   Skin: Negative for rash.   Gastrointestinal: Negative for abdominal pain and acid reflux.   Neurological: Negative for dizziness and weakness.   Hematological: Negative for adenopathy.       Objective:      Physical Exam   Constitutional: She is oriented to person, place, and time. She appears well-developed and well-nourished.   HENT:   Head: Normocephalic.   Nose: Nose normal.   Mouth/Throat: No oropharyngeal exudate.   Neck: Normal range of motion. No JVD present. No tracheal deviation present. No thyromegaly present.   Cardiovascular: Normal rate, regular rhythm and normal heart sounds.    No murmur heard.  Pulmonary/Chest: Symmetric chest wall expansion. No stridor. She has no wheezes. She has no rhonchi. She has no rales. She exhibits no tenderness.   Abdominal: Soft. There is no tenderness.   Musculoskeletal: She exhibits no edema.   Lymphadenopathy:     She has no cervical adenopathy.   Neurological: She is alert and oriented to person, place, and time.   Skin: Skin is warm and dry. No rash noted. No erythema. Nails show no clubbing.   Psychiatric: She has a normal mood and affect.   Vitals reviewed.    Personal Diagnostic Review    No flowsheet data found.      Assessment:       1.  Cough    2. Acute bronchitis due to other specified organisms    3. Laryngopharyngeal reflux (LPR)    4. Seasonal allergic rhinitis, unspecified trigger        Outpatient Encounter Prescriptions as of 6/13/2018   Medication Sig Dispense Refill    albuterol 90 mcg/actuation inhaler Inhale 2 puffs into the lungs every 6 (six) hours as needed for Wheezing. 1 each 11    azelastine (ASTELIN) 137 mcg (0.1 %) nasal spray 1 spray (137 mcg total) by Nasal route 2 (two) times daily. 30 mL 11    EPIPEN 2-CHIDI 0.3 mg/0.3 mL (1:1,000) AtIn       fexofenadine (ALLEGRA) 30 MG tablet Take 30 mg by mouth 2 (two) times daily.      fluticasone (FLONASE) 50 mcg/actuation nasal spray 2 sprays by Each Nare route once daily.      gabapentin (NEURONTIN) 300 MG capsule Take 1 capsule (300 mg total) by mouth 3 (three) times daily. 90 capsule 2    levonorgestrel (MIRENA) 20 mcg/24 hr (5 years) IUD 1 each by Intrauterine route once.      montelukast (SINGULAIR) 10 mg tablet Take 1 tablet (10 mg total) by mouth once daily. 30 tablet 6    omeprazole (PRILOSEC) 40 MG capsule Take 1 capsule (40 mg total) by mouth before breakfast. Take on empty stomach 30-60 minutes before meal 30 capsule 11    ranitidine (ZANTAC) 300 MG tablet Take 1 tablet (300 mg total) by mouth every evening. 30 tablet 11     No facility-administered encounter medications on file as of 6/13/2018.      No orders of the defined types were placed in this encounter.    Plan:     Encouraged continued medical treatment for reflux.  Continue Ventolin HFA if needed to control cough.  Continue antihistamine and/or Flonase NS.  Call/return if cough persists.

## 2018-06-14 NOTE — PATIENT INSTRUCTIONS
Encouraged continued medical treatment for reflux.  Continue Ventolin HFA if needed to control cough.  Continue antihistamine and/or Flonase NS.  Call/return if cough persists.

## 2018-06-27 ENCOUNTER — OFFICE VISIT (OUTPATIENT)
Dept: ALLERGY | Facility: CLINIC | Age: 48
End: 2018-06-27
Payer: COMMERCIAL

## 2018-06-27 VITALS
HEIGHT: 64 IN | BODY MASS INDEX: 36.43 KG/M2 | SYSTOLIC BLOOD PRESSURE: 130 MMHG | WEIGHT: 213.38 LBS | DIASTOLIC BLOOD PRESSURE: 78 MMHG

## 2018-06-27 DIAGNOSIS — Z91.09 ENVIRONMENTAL ALLERGIES: ICD-10-CM

## 2018-06-27 DIAGNOSIS — R05.9 COUGH: Primary | ICD-10-CM

## 2018-06-27 DIAGNOSIS — K21.9 LARYNGOPHARYNGEAL REFLUX (LPR): ICD-10-CM

## 2018-06-27 PROCEDURE — 99999 PR PBB SHADOW E&M-EST. PATIENT-LVL II: CPT | Mod: PBBFAC,,, | Performed by: ALLERGY & IMMUNOLOGY

## 2018-06-27 PROCEDURE — 99214 OFFICE O/P EST MOD 30 MIN: CPT | Mod: S$GLB,,, | Performed by: ALLERGY & IMMUNOLOGY

## 2018-06-27 PROCEDURE — 3008F BODY MASS INDEX DOCD: CPT | Mod: CPTII,S$GLB,, | Performed by: ALLERGY & IMMUNOLOGY

## 2018-06-27 RX ORDER — ERGOCALCIFEROL 1.25 MG/1
50000 CAPSULE ORAL
Qty: 12 CAPSULE | Refills: 1 | Status: SHIPPED | OUTPATIENT
Start: 2018-06-27 | End: 2019-04-10 | Stop reason: SDUPTHER

## 2018-06-27 NOTE — PROGRESS NOTES
Gisella Burgess returns to clinic today for continued evaluation of chronic rhinitis, conjunctivitis, pruritus, and a history of food allergy.  She is here alone.  She was last seen June 5, 2018.    After her last visit, she did see NP Cece Guerrero who did see evidence of LPR.  She started her on omeprazole in the morning and ranitidine at night.    She does think that she has seen some while on these medications.    She did see Dr. Chung in Pulmonary on June 13, 2018.  He advised her to continue treatment for LPR and monitor her cough.    She has had this sensation of throat closing after eating nuts.  Her food skin tests at a previous visit were negative.    She does have seasonal rhinitis which is worse certain times of the year.    She continues to take Astelin, Allegra, and Flonase.  She did discontinue her Singulair.    She does have albuterol if needed.  She also has an EpiPen.    [facility] PEQ ALLERGY QUESTIONNAIRE SHORT 6/27/2018   Are you taking any new medications since your last visit? Yes   Constitution: No symptoms   Head or facial pain: Sinus pressure   Eyes: Itching   Ears: No symptoms   Nose: Sniffling, Blocked nose   Throat: Hoarseness   Sinuses: No symptoms   Lungs: Cough   Skin: Itching   Cardiovascular: No symptoms   Gastrointestinal: No symptoms   Genital/ urinary No symptoms   Musculoskeletal: Back pain   Neurologic: Headaches   Endocrine: No symptoms   Hematologic: No symptoms     Physical Examination:  General: Well-developed, well-nourished, no acute distress.  Head: No sinus tenderness.  Eyes: Conjunctivae:  No bulbar or palpebral conjunctival injection.  Ears: EAC's clear.  TM's clear.  No pre-auricular nodes.  Nose: Nasal Mucosa:  Pink.  Septum: No apparent deviation.  Turbinates:  No significant edema.  Polyps/Mass:  None visible.  Teeth/Gums:  No bleeding noted.  Oropharynx: No exudates.  Neck: Supple without thyromegaly. No cervical lymphadenopathy.    Respiratory/Chest: Effort: Good.   Auscultation:  Clear bilaterally.  Cardiovascular:  No murmur, rubs, or gallop heard.   GI:  Non-tender.  No masses.  No organomegaly.  Extremities:  No swelling.  No cyanosis, clubbing, or edema.  Skin: Good turgor.  No urticaria or angioedema.  Neuro/Psych: Oriented x 3.    Laboratory 3/30/2015:  IgE level: Less than 35.  ImmunoCAP: Negative.  LFTs: Normal.    Food skin tests: 3+ histamine control. All tests were negative.    Spirometry 06/05/2018:  Normal spirometry.    Chest x-ray 06/05/2018:  Normal.    Sinus x-ray 06/07/2018:  Normal.    Assessment:  1.  Chronic rhinitis, consider allergic.  2.  Chronic conjunctivitis, consider allergic.  3.  Chronic cough, consider allergic component.  4.  Doubt food allergy with negative skin tests.  5.  GERD.  6.   LPR.  7.   S/P surgery for spondylolisthesis 2013.    Recommendations:  1.  Continue omeprazole and ranitidine.  2.  Follow symptoms on above.  3.  Use Astelin and Allegra as needed.    4.  Continue Flonase for now.  5.  Consider inhalant skin tests off antihistamines in the future.  6.  Discussed possible food challenge in the future.  7.  Return to clinic in 4 to 6 weeks.    Reviewed relationship between LPR and a sensation of throat closing.

## 2018-07-23 ENCOUNTER — OFFICE VISIT (OUTPATIENT)
Dept: ALLERGY | Facility: CLINIC | Age: 48
End: 2018-07-23
Payer: COMMERCIAL

## 2018-07-23 VITALS
HEART RATE: 84 BPM | SYSTOLIC BLOOD PRESSURE: 132 MMHG | WEIGHT: 215.38 LBS | DIASTOLIC BLOOD PRESSURE: 98 MMHG | HEIGHT: 64 IN | BODY MASS INDEX: 36.77 KG/M2

## 2018-07-23 DIAGNOSIS — R05.9 COUGH: ICD-10-CM

## 2018-07-23 DIAGNOSIS — J02.9 ACUTE PHARYNGITIS, UNSPECIFIED ETIOLOGY: Primary | ICD-10-CM

## 2018-07-23 DIAGNOSIS — J31.0 RHINITIS, UNSPECIFIED TYPE: ICD-10-CM

## 2018-07-23 DIAGNOSIS — K21.9 LARYNGOPHARYNGEAL REFLUX (LPR): ICD-10-CM

## 2018-07-23 LAB — DEPRECATED S PYO AG THROAT QL EIA: NEGATIVE

## 2018-07-23 PROCEDURE — 87880 STREP A ASSAY W/OPTIC: CPT

## 2018-07-23 PROCEDURE — 87081 CULTURE SCREEN ONLY: CPT

## 2018-07-23 PROCEDURE — 3008F BODY MASS INDEX DOCD: CPT | Mod: CPTII,S$GLB,, | Performed by: ALLERGY & IMMUNOLOGY

## 2018-07-23 PROCEDURE — 99999 PR PBB SHADOW E&M-EST. PATIENT-LVL III: CPT | Mod: PBBFAC,,, | Performed by: ALLERGY & IMMUNOLOGY

## 2018-07-23 PROCEDURE — 99214 OFFICE O/P EST MOD 30 MIN: CPT | Mod: S$GLB,,, | Performed by: ALLERGY & IMMUNOLOGY

## 2018-07-23 RX ORDER — AZITHROMYCIN 250 MG/1
250 TABLET, FILM COATED ORAL DAILY
Qty: 6 TABLET | Refills: 0 | Status: SHIPPED | OUTPATIENT
Start: 2018-07-23 | End: 2018-10-15

## 2018-07-23 NOTE — PROGRESS NOTES
Gisella Burgess returns to clinic today for continued evaluation of chronic rhinitis, conjunctivitis, pruritus, and a history of food allergy.  She is here alone.  She was last seen June 27, 2018.    She did see NP Cece Guerrero who did see evidence of LPR.  She has been on omeprazole in the morning and ranitidine at night.  She does think that this has helped.  Her cough is improving.    Last Friday she developed a sore throat that has increased in severity.  She does think that it is getting worse.  She took her temperature and it was 99.3°.  She has also had popping of her ears.    This morning she started coughing up yellow thick mucus.  She denies any wheezing or shortness of breath.    She tried to stop her antihistamines but she developed increased itching of her nose, eyes, and skin.  She is now taking Allegra, Singulair, and Flonase.    She continues to avoid nuts.    [facility] PEQ ALLERGY QUESTIONNAIRE SHORT 7/23/2018   Are you taking any new medications since your last visit? Yes   Constitution: Fever   Head or facial pain: No symptoms   Eyes: Itching   Ears: Fullness   Nose: Sniffling, Sneezing   Throat: Sore throat, Hoarseness   Sinuses: No symptoms   Lungs: Cough   Skin: No symptoms   Cardiovascular: No symptoms   Gastrointestinal: No symptoms   Genital/ urinary No symptoms   Musculoskeletal: No symptoms   Neurologic: No symptoms   Endocrine: No symptoms   Hematologic: No symptoms     Physical Examination:  General: Well-developed, well-nourished, no acute distress.  Head: No sinus tenderness.  Eyes: Conjunctivae:  No bulbar or palpebral conjunctival injection.  Ears: EAC's clear.  TM's clear.  No pre-auricular nodes.  Nose: Nasal Mucosa:  Pink.  Septum: No apparent deviation.  Turbinates:  No significant edema.  Polyps/Mass:  None visible.  Teeth/Gums:  No bleeding noted.  White exudate on right tonsil.  Oropharynx: No exudates.  Neck: Supple without thyromegaly. No cervical lymphadenopathy.     Respiratory/Chest: Effort: Good.  Auscultation:  Clear bilaterally.  Skin: Good turgor.  No urticaria or angioedema.  Neuro/Psych: Oriented x 3.    Laboratory 3/30/2015:  IgE level: Less than 35.  ImmunoCAP: Negative.  LFTs: Normal.    Food skin tests: 3+ histamine control. All tests were negative.    Spirometry 06/05/2018:  Normal spirometry.    Chest x-ray 06/05/2018:  Normal.    Sinus x-ray 06/07/2018:  Normal.    Assessment:  1.  Chronic rhinitis, consider allergic.  2.  Chronic conjunctivitis, consider allergic.  3.  Chronic cough, consider allergic component.  4.  Doubt food allergy with negative skin tests.  5.  GERD.  6.   LPR.  He has had the he has a are  7.   S/P surgery for spondylolisthesis 2013.  8.  Acute pharyngitis with bronchitis, probably viral, doubt strep..    Recommendations:  1.  Strep screen.  2.  Z-Johnathon if cough and sputum continue or worsen.  3.  Bedrest.  4.  Continue omeprazole and ranitidine.  5.  Follow symptoms on above.  6.  Astelin and Allegra as needed.  7.  Continue Flonase and Singulair.  8.  Inhalant skin testing in about a month off antihistamines.

## 2018-07-25 ENCOUNTER — PATIENT MESSAGE (OUTPATIENT)
Dept: ALLERGY | Facility: CLINIC | Age: 48
End: 2018-07-25

## 2018-07-26 DIAGNOSIS — Z12.11 COLON CANCER SCREENING: ICD-10-CM

## 2018-07-26 LAB — BACTERIA THROAT CULT: NORMAL

## 2018-08-14 ENCOUNTER — OFFICE VISIT (OUTPATIENT)
Dept: OPTOMETRY | Facility: CLINIC | Age: 48
End: 2018-08-14
Payer: COMMERCIAL

## 2018-08-14 DIAGNOSIS — H40.051 OCULAR HYPERTENSION OF RIGHT EYE: ICD-10-CM

## 2018-08-14 DIAGNOSIS — H20.00 ACUTE IRITIS: Primary | ICD-10-CM

## 2018-08-14 PROCEDURE — 92012 INTRM OPH EXAM EST PATIENT: CPT | Mod: S$GLB,,, | Performed by: OPTOMETRIST

## 2018-08-14 PROCEDURE — 99999 PR PBB SHADOW E&M-EST. PATIENT-LVL III: CPT | Mod: PBBFAC,,, | Performed by: OPTOMETRIST

## 2018-08-14 RX ORDER — DIFLUPREDNATE OPHTHALMIC 0.5 MG/ML
1 EMULSION OPHTHALMIC 4 TIMES DAILY
Qty: 3 ML | Refills: 0 | Status: SHIPPED | OUTPATIENT
Start: 2018-08-14 | End: 2018-08-24

## 2018-08-14 NOTE — PROGRESS NOTES
HPI     48yr old female present for Urgent Care visit. Patient has history of   Uveitis OD (over 10yrs ago). Patient states yesterday she started with   symptoms of photophobia, redness and eye pain OD. Pt had chest xrays and   other testing done in the past to find out why she developed Uveitis years   ago. Pt states no results found with testing. Pt notes this morning OD   vision blurry, but improved now.     Last edited by Portillo Terry on 8/14/2018  3:32 PM. (History)            Assessment /Plan     For exam results, see Encounter Report.    Acute iritis OD  Ocular hypertension of right eye   Cylopentolate OD today   Start   difluprednate (DUREZOL) 0.05 % Drop ophthalmic solution; Place 1 drop into both eyes 4 (four) times daily. for 10 days  Dispense: 3 mL; Refill: 0  -     timolol 0.5 % ophthalmic solution; Place 1 drop into the right eye 2 (two) times daily.  Dispense: 5 mL; Refill: 0      RTC 1 week for follow up and IOP, then taper if improveing

## 2018-08-20 ENCOUNTER — OFFICE VISIT (OUTPATIENT)
Dept: OPTOMETRY | Facility: CLINIC | Age: 48
End: 2018-08-20
Payer: COMMERCIAL

## 2018-08-20 DIAGNOSIS — H20.00 ACUTE IRITIS: Primary | ICD-10-CM

## 2018-08-20 PROCEDURE — 92012 INTRM OPH EXAM EST PATIENT: CPT | Mod: S$GLB,,, | Performed by: OPTOMETRIST

## 2018-08-20 PROCEDURE — 99999 PR PBB SHADOW E&M-EST. PATIENT-LVL II: CPT | Mod: PBBFAC,,, | Performed by: OPTOMETRIST

## 2018-08-20 NOTE — PROGRESS NOTES
HPI     Pt seen 8/14/18 w/Dr. Patel due to Uveitis OD  48yr old female present for F/U for Uveitis OD. Patient states her vision   still blurry OD, but no eye pain. Pt still using Durezol QID-OD and   Timolol BID-OD. Pt notice she's having headaches lasting most of the day.   No floaters and flashes.      Last edited by Portillo Terry on 8/20/2018  9:26 AM. (History)            Assessment /Plan     For exam results, see Encounter Report.    Acute iritis      Decrease durezol to TID x 1 week    Keratitis OU  Use ointment in both eyes at bedtime      RTC 1 week for follow up

## 2018-08-27 ENCOUNTER — OFFICE VISIT (OUTPATIENT)
Dept: OPTOMETRY | Facility: CLINIC | Age: 48
End: 2018-08-27
Payer: COMMERCIAL

## 2018-08-27 DIAGNOSIS — H20.00 ACUTE IRITIS: Primary | ICD-10-CM

## 2018-08-27 PROCEDURE — 92012 INTRM OPH EXAM EST PATIENT: CPT | Mod: S$GLB,,, | Performed by: OPTOMETRIST

## 2018-08-27 PROCEDURE — 99999 PR PBB SHADOW E&M-EST. PATIENT-LVL III: CPT | Mod: PBBFAC,,, | Performed by: OPTOMETRIST

## 2018-08-27 RX ORDER — DIFLUPREDNATE OPHTHALMIC 0.5 MG/ML
1 EMULSION OPHTHALMIC 2 TIMES DAILY
Qty: 1 ML | Refills: 0 | Status: SHIPPED | OUTPATIENT
Start: 2018-08-27 | End: 2018-09-03

## 2018-08-27 NOTE — PROGRESS NOTES
HPI     Last seen: 8/20/18  48yr old female present for F/U Uveitis OD. Patient states she was only   able to use the ointment 3 days out of the 7, due to traveling. Pt says   vision still blurry OD, but no eye pain and headaches. Pt experience   photophobia once when driving.     Last edited by Portillo Terry on 8/27/2018  4:20 PM. (History)            Assessment /Plan     For exam results, see Encounter Report.    Acute iritis  Decrease Durezol to BIDx 1 week, then QD x 2 weeks  Timolol BID  Systane BID      RTC 2 weeks for follow up

## 2018-09-14 ENCOUNTER — OFFICE VISIT (OUTPATIENT)
Dept: OPTOMETRY | Facility: CLINIC | Age: 48
End: 2018-09-14
Payer: COMMERCIAL

## 2018-09-14 DIAGNOSIS — H20.9 ANTERIOR UVEITIS: Primary | ICD-10-CM

## 2018-09-14 PROCEDURE — 92012 INTRM OPH EXAM EST PATIENT: CPT | Mod: S$GLB,,, | Performed by: OPTOMETRIST

## 2018-09-14 PROCEDURE — 99999 PR PBB SHADOW E&M-EST. PATIENT-LVL II: CPT | Mod: PBBFAC,,, | Performed by: OPTOMETRIST

## 2018-09-14 NOTE — PROGRESS NOTES
HPI     48yr old female present for 2 week F/U due to Uveitis OD. Patient states   she was unable to  Durezol drops due to being out of town for work.   Pt complains that OD still blurry since last visit. Pt using Timolol   BID-OD last instilled this morning. Pt denies eye pain and photophobia.     Last edited by Portillo Terry MA on 9/14/2018  3:43 PM. (History)            Assessment /Plan     For exam results, see Encounter Report.    Anterior uveitis    No improvement since last visit  IOP elevated since last visit OD>OS    Continue on timolol BID, restart durezol  (pt stopped approx 1 week ago since ran out when out of town for work)    Consult Dr. Rodríguez,   Celinat made for Monday

## 2018-09-17 ENCOUNTER — INITIAL CONSULT (OUTPATIENT)
Dept: OPHTHALMOLOGY | Facility: CLINIC | Age: 48
End: 2018-09-17
Payer: COMMERCIAL

## 2018-09-17 DIAGNOSIS — H20.9 ANTERIOR UVEITIS: Primary | ICD-10-CM

## 2018-09-17 PROCEDURE — 99999 PR PBB SHADOW E&M-EST. PATIENT-LVL II: CPT | Mod: PBBFAC,,, | Performed by: OPHTHALMOLOGY

## 2018-09-17 PROCEDURE — 92012 INTRM OPH EXAM EST PATIENT: CPT | Mod: S$GLB,,, | Performed by: OPHTHALMOLOGY

## 2018-09-17 RX ORDER — DIFLUPREDNATE OPHTHALMIC 0.5 MG/ML
1 EMULSION OPHTHALMIC 4 TIMES DAILY
COMMUNITY
End: 2018-10-15 | Stop reason: SDUPTHER

## 2018-09-17 NOTE — PROGRESS NOTES
HPI     DLS: 9/14/18 with Dr. Patel;    Pt here for high IOP's per Dr. Patel;    Meds: Timolol bid od             Durezol bid od      Last edited by Michelle Darling on 9/17/2018  2:03 PM. (History)            Assessment /Plan     For exam results, see Encounter Report.    Anterior uveitis    Pt with active inflammation on exam, KP, IOP ok this is now the 2nd episode    [place] episode [place] another state - she had a W/U that did not reveal any cause    Durezol BID--> increase to QID (still with +2-3 cell and flare od - after a month of durezol)   Continue timolol BID OD    Consult Beat[facility] for irits and iritis management     F/U me 2 months - sooner if IOP goes up

## 2018-09-17 NOTE — LETTER
September 17, 2018      Jessica Patel, OD  [address]  [facility] [address]           [facility]  [address]  [facility] [address]  Phone: [phone]  Fax: [phone]          Patient: Gisella Bennett   MR Number: [id number]   YOB: 1970   Date of Visit: 9/17/2018       Dear Dr. Jessica Patel:    Thank you for referring Gisella Bennett to me for evaluation. Attached you will find relevant portions of my assessment and plan of care.    If you have questions, please do not hesitate to call me. I look forward to following Gisella Bennett along with you.    Sincerely,    Fabiola Rodríguez MD    Enclosure  CC:  No Recipients    If you would like to receive this communication electronically, please contact [email or web address] or [phone] to request more information on [facility] access.    For providers and/or their staff who would like to refer a patient to [facility], please contact us through our one-stop-shop provider referral line, [facility], at [phone].    If you feel you have received this communication in error or would no longer like to receive these types of communications, please e-mail [email or web address]

## 2018-10-15 ENCOUNTER — INITIAL CONSULT (OUTPATIENT)
Dept: OPHTHALMOLOGY | Facility: CLINIC | Age: 48
End: 2018-10-15
Payer: COMMERCIAL

## 2018-10-15 DIAGNOSIS — H20.9 ANTERIOR UVEITIS: Primary | ICD-10-CM

## 2018-10-15 PROCEDURE — 99999 PR PBB SHADOW E&M-EST. PATIENT-LVL II: CPT | Mod: PBBFAC,,, | Performed by: OPHTHALMOLOGY

## 2018-10-15 PROCEDURE — 92014 COMPRE OPH EXAM EST PT 1/>: CPT | Mod: S$GLB,,, | Performed by: OPHTHALMOLOGY

## 2018-10-15 RX ORDER — DIFLUPREDNATE OPHTHALMIC 0.5 MG/ML
1 EMULSION OPHTHALMIC 4 TIMES DAILY
Qty: 5 ML | Refills: 3 | Status: SHIPPED | OUTPATIENT
Start: 2018-10-15 | End: 2018-12-20

## 2018-10-15 NOTE — LETTER
October 15, 2018      Fabiola Rodríguez MD  [address]  [facility] [address]           [facility] - Ophthalmology  [address]  [facility] [address]  Phone: [phone]  Fax: [phone]          Patient: Gisella Bennett   MR Number: [id number]   YOB: 1970   Date of Visit: 10/15/2018       Dear Dr. Fabiola Rodríguez:    Thank you for referring Gisella Bennett to me for evaluation. Attached you will find relevant portions of my assessment and plan of care.    If you have questions, please do not hesitate to call me. I look forward to following Gisella Bennett along with you.    Sincerely,    Greta Barron MD    Enclosure  CC:  No Recipients    If you would like to receive this communication electronically, please contact [email or web address] or [phone] to request more information on [facility] Link access.    For providers and/or their staff who would like to refer a patient to [facility], please contact us through our one-stop-shop provider referral line, [facility] [place], at [phone].    If you feel you have received this communication in error or would no longer like to receive these types of communications, please e-mail [email or web address]

## 2018-10-15 NOTE — PROGRESS NOTES
HPI     Referred by Dr Rodríguez / Jorge    Iritis OD - 2nd episode    Durezol QID OD  Timolol BID OD    Pt referred by Dr Rodríguez for iritis eval and management OD.  Pt states   blurry VA OD.  Pt denies eye pain or photophobia.     Last edited by Greta Barron MD on 10/15/2018  9:27 AM. (History)            Assessment /Plan     For exam results, see Encounter Report.    Anterior uveitis  -     C-reactive protein; Future; Expected date: 10/15/2018  -     FTA antibodies, IgG and IgM; Future; Expected date: 10/15/2018  -     Rheumatoid factor; Future; Expected date: 10/15/2018  -     AMALIA; Future; Expected date: 10/15/2018  -     Sedimentation rate; Future; Expected date: 10/15/2018  -     RPR; Future; Expected date: 10/15/2018  -     Angiotensin converting enzyme; Future; Expected date: 10/15/2018  -     Anti-neutrophilic cytoplasmic antibody; Future; Expected date: 10/15/2018    Other orders  -     difluprednate (DUREZOL) 0.05 % Drop ophthalmic solution; Place 1 drop into the right eye 4 (four) times daily.  Dispense: 5 mL; Refill: 3    Iritis OD - 2nd episode  - improving, cpm  - repeat blood work today, 1st episode w/up was negative (pt saw rheum at that time, ~20 yrs ago)    F/up 2 wks - va/IOP OD    Durezol QID OD  Timolol BID OD

## 2018-10-18 ENCOUNTER — TELEPHONE (OUTPATIENT)
Dept: OPHTHALMOLOGY | Facility: CLINIC | Age: 48
End: 2018-10-18

## 2018-11-02 ENCOUNTER — OFFICE VISIT (OUTPATIENT)
Dept: OPHTHALMOLOGY | Facility: CLINIC | Age: 48
End: 2018-11-02
Payer: COMMERCIAL

## 2018-11-02 DIAGNOSIS — H20.9 ANTERIOR UVEITIS: Primary | ICD-10-CM

## 2018-11-02 PROCEDURE — 92014 COMPRE OPH EXAM EST PT 1/>: CPT | Mod: S$GLB,,, | Performed by: OPHTHALMOLOGY

## 2018-11-02 PROCEDURE — 99999 PR PBB SHADOW E&M-EST. PATIENT-LVL III: CPT | Mod: PBBFAC,,, | Performed by: OPHTHALMOLOGY

## 2018-11-02 RX ORDER — PREDNISONE 20 MG/1
40 TABLET ORAL DAILY
Qty: 60 TABLET | Refills: 2 | Status: SHIPPED | OUTPATIENT
Start: 2018-11-02 | End: 2018-12-02

## 2018-11-02 NOTE — PROGRESS NOTES
HPI     Referred by Dr Rodríguez / Jorge     Iritis OD - 2nd episode (+AMALIA, w/up otherwise neg)    Durezol QID OD   Timolol BID OD     Patient still having blur va intermittent through out the day. va is   clearer in the morning.     Last edited by Greta Barron MD on 11/5/2018 10:44 AM. (History)            Assessment /Plan     For exam results, see Encounter Report.    Anterior uveitis    Other orders  -     predniSONE (DELTASONE) 20 MG tablet; Take 2 tablets (40 mg total) by mouth once daily.  Dispense: 60 tablet; Refill: 2        Iritis OD - 2nd episode  + AMALIA, w/up otherwise unremarkable (1st w/up negative when she saw rheum ~20 yrs ago)  - improving, yet still with tr cell despite durezol.  - start oral steroids as below    F/up 2 wks - va/IOP OD - F/up 11/27 330 pm.      ORAL PREDNISONE 2 PILLS A DAY FOR 1 WEEK, THEN DECREASE TO 1 PILL A DAY UNTIL YOU SEE ME AGAIN    DUREZOL - 3 TIMES A DAY    TIMOLOL - TWICE A DAY

## 2018-11-21 ENCOUNTER — OFFICE VISIT (OUTPATIENT)
Dept: RHEUMATOLOGY | Facility: CLINIC | Age: 48
End: 2018-11-21
Payer: COMMERCIAL

## 2018-11-21 ENCOUNTER — HOSPITAL ENCOUNTER (OUTPATIENT)
Dept: RADIOLOGY | Facility: HOSPITAL | Age: 48
Discharge: HOME | End: 2018-11-21
Attending: INTERNAL MEDICINE
Payer: COMMERCIAL

## 2018-11-21 VITALS — WEIGHT: 216.5 LBS | HEIGHT: 64 IN | BODY MASS INDEX: 36.96 KG/M2

## 2018-11-21 DIAGNOSIS — H20.9 UVEITIS: ICD-10-CM

## 2018-11-21 DIAGNOSIS — R76.8 ANA POSITIVE: ICD-10-CM

## 2018-11-21 DIAGNOSIS — R53.83 FATIGUE, UNSPECIFIED TYPE: ICD-10-CM

## 2018-11-21 DIAGNOSIS — H20.9 UVEITIS: Primary | ICD-10-CM

## 2018-11-21 PROCEDURE — 99999 PR PBB SHADOW E&M-EST. PATIENT-LVL II: CPT | Mod: PBBFAC,,, | Performed by: INTERNAL MEDICINE

## 2018-11-21 PROCEDURE — 71046 X-RAY EXAM CHEST 2 VIEWS: CPT | Mod: 26,,, | Performed by: RADIOLOGY

## 2018-11-21 PROCEDURE — 3008F BODY MASS INDEX DOCD: CPT | Mod: CPTII,S$GLB,, | Performed by: INTERNAL MEDICINE

## 2018-11-21 PROCEDURE — 99203 OFFICE O/P NEW LOW 30 MIN: CPT | Mod: S$GLB,,, | Performed by: INTERNAL MEDICINE

## 2018-11-21 PROCEDURE — 71046 X-RAY EXAM CHEST 2 VIEWS: CPT | Mod: TC,FY

## 2018-11-21 ASSESSMENT — ROUTINE ASSESSMENT OF PATIENT INDEX DATA (RAPID3)
PAIN SCORE: 2.5
FATIGUE SCORE: 5
PSYCHOLOGICAL DISTRESS SCORE: 3.3
MDHAQ FUNCTION SCORE: .1
PATIENT GLOBAL ASSESSMENT SCORE: 6
WHEN YOU AWAKENED IN THE MORNING OVER THE LAST WEEK, PLEASE INDICATE THE AMOUNT OF TIME IT TAKES UNTIL YOU ARE AS LIMBER AS YOU WILL BE FOR THE DAY: 20 MINUTES
AM STIFFNESS SCORE: 1, YES
TOTAL RAPID3 SCORE: 2.94

## 2018-11-21 NOTE — PROGRESS NOTES
"Subjective:       Patient ID: Gisella Bennett is a 48 y.o. female.    Chief Complaint: Uveitis    HPI:  Gisella Bennett is a 48 y.o. female with history of uveitis in right eye 15-20 years ago now with recurrence.  Saw rheumatologist with first episode and work up negative.  Now with right eye uveitis that is prolonged since August 14.    Dry eyes during allergy season.  Dry mouth with prednison  No vaginal dryness.  Tingling in face associated with atypical trigeminal neuralgia that began in 2014 manifests as cool tingling in face.        Morning stiffness 20 minutes influenced by how she sleeps.    Lupus Review of Systems  Alopecia: no  Photosensitivity: no   Raynaud's: no  Oral or nasal ulcers: no  Rashes: no   No pleurisy or pericarditis.  No seizures, psychosis, or stroke.  No venous or arterial clots.  Pregnancy hx (if applicable): no miscarriages; 3 full term no complications      Review of Systems   Constitutional: Positive for fatigue.   HENT: Negative.    Eyes:        Dry mouth   Respiratory: Negative.    Cardiovascular: Negative.    Gastrointestinal: Negative.    Endocrine: Negative.    Genitourinary: Negative.    Musculoskeletal: Negative.    Skin: Negative.    Allergic/Immunologic: Negative.    Neurological: Positive for headaches (with uveitis now and if bright light or looking at screens).   Hematological: Negative.    Psychiatric/Behavioral: Negative.          Objective:   Ht 5' 4" (1.626 m)   Wt 98.2 kg (216 lb 7.9 oz)   BMI 37.16 kg/m²      Physical Exam   Constitutional: She is oriented to person, place, and time and well-developed, well-nourished, and in no distress.   HENT:   Head: Normocephalic.   Eyes: Conjunctivae and EOM are normal.   Neck: Neck supple.   Cardiovascular: Normal rate, regular rhythm and normal heart sounds.    Pulmonary/Chest: Breath sounds normal.   Abdominal: Soft. Bowel sounds are normal.   Neurological: She is alert and oriented to person, place, and time. " Gait normal.   Skin: Skin is warm and dry.     Psychiatric: Mood and affect normal.          Assessment:       1.  Uveitis  2.  Fatigue  3.  Positive AMALIA  4.  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  Joint pains 1-2 times a year for an afternoon    Plan:       1. Labs  2. CXR  RTO 4 months

## 2018-11-26 ENCOUNTER — PATIENT MESSAGE (OUTPATIENT)
Dept: OPHTHALMOLOGY | Facility: CLINIC | Age: 48
End: 2018-11-26

## 2018-11-27 ENCOUNTER — OFFICE VISIT (OUTPATIENT)
Dept: OPHTHALMOLOGY | Facility: CLINIC | Age: 48
End: 2018-11-27
Payer: COMMERCIAL

## 2018-11-27 DIAGNOSIS — H20.9 ANTERIOR UVEITIS: Primary | ICD-10-CM

## 2018-11-27 PROCEDURE — 92014 COMPRE OPH EXAM EST PT 1/>: CPT | Mod: S$GLB,,, | Performed by: OPHTHALMOLOGY

## 2018-11-27 PROCEDURE — 99999 PR PBB SHADOW E&M-EST. PATIENT-LVL III: CPT | Mod: PBBFAC,,, | Performed by: OPHTHALMOLOGY

## 2018-11-27 NOTE — PROGRESS NOTES
HPI     Referred by Dr Rodríguez / Jorge     Iritis OD - 2nd episode (+AMALIA, w/up otherwise neg)    ORAL PREDNISONE 2 PILLs A DAY  DUREZOL - 3 TIMES A DAY  TIMOLOL - TWICE A DAY       Patient sts blurry vision OD, patient sts she does not like how the oral   Pred makes her feel.    Last edited by Greta Barron MD on 11/27/2018  4:04 PM. (History)            Assessment /Plan     For exam results, see Encounter Report.    Anterior uveitis        Iritis OD - 2nd episode  + AMALIA, w/up otherwise unremarkable (1st w/up negative when she saw rheum ~20 yrs ago)  - quiet today after durezol + oral steroids    meds as below, f/up dr valdovinos to ensure no rebound inflammation and REE and IOP check.            ORAL PREDNISONE -  DECREASE TO 1 PILL A DAY for 3 days then 1/2 pill for 3 days then stop    DUREZOL - 2 TIMES A DAY FOR 1 WK THEN DAILY FOR 1 WEEK THEN STOP    TIMOLOL - ONCE IN THE MORNING x2 wks then stop.

## 2018-11-27 NOTE — PATIENT INSTRUCTIONS
ORAL PREDNISONE -  DECREASE TO 1 PILL A DAY for 3 days then 1/2 pill for 3 days then stop    DUREZOL - 2 TIMES A DAY FOR 1 WK THEN DAILY FOR 1 WEEK THEN STOP    TIMOLOL - ONCE IN THE MORNING.

## 2018-12-07 ENCOUNTER — OFFICE VISIT (OUTPATIENT)
Dept: URGENT CARE | Facility: CLINIC | Age: 48
End: 2018-12-07
Payer: COMMERCIAL

## 2018-12-07 VITALS
DIASTOLIC BLOOD PRESSURE: 103 MMHG | HEART RATE: 109 BPM | HEIGHT: 64 IN | TEMPERATURE: 98 F | BODY MASS INDEX: 38.07 KG/M2 | SYSTOLIC BLOOD PRESSURE: 151 MMHG | WEIGHT: 223 LBS | OXYGEN SATURATION: 100 % | RESPIRATION RATE: 16 BRPM

## 2018-12-07 DIAGNOSIS — R00.2 PALPITATIONS: ICD-10-CM

## 2018-12-07 DIAGNOSIS — T88.7XXA MEDICATION SIDE EFFECT: Primary | ICD-10-CM

## 2018-12-07 PROCEDURE — 93010 ELECTROCARDIOGRAM REPORT: CPT | Mod: S$GLB,,, | Performed by: INTERNAL MEDICINE

## 2018-12-07 PROCEDURE — 93005 ELECTROCARDIOGRAM TRACING: CPT | Mod: S$GLB,,, | Performed by: NURSE PRACTITIONER

## 2018-12-07 PROCEDURE — 3008F BODY MASS INDEX DOCD: CPT | Mod: CPTII,S$GLB,, | Performed by: NURSE PRACTITIONER

## 2018-12-07 PROCEDURE — 99214 OFFICE O/P EST MOD 30 MIN: CPT | Mod: S$GLB,,, | Performed by: NURSE PRACTITIONER

## 2018-12-07 NOTE — PATIENT INSTRUCTIONS
Please follow up with your Primary Provider in the next week for your elevated blood pressure.    If your symptoms worsen, you feel weak, faint, dizzy, chest or back pain or shortness of breath, please go immediately to the Emergency Department.    Noncardiac Chest Pain    Based on your visit today, the healthcare provider doesnt know what is causing your chest pain. In most cases, people who come to the emergency department with chest pain dont have a problem with their heart. Instead, the pain is caused by other conditions. It's important for the healthcare team to be sure you are not having a life threatening cause for chest pain such as a heart attack, blood clot in the lungs, collapsed lung, ruptured esophagus, or tearing of the aorta. Once these major causes have been ruled out, you may have further evaluation for non-heart causes of chest pain. These may be problems with the lungs, muscles, bones, digestive tract, nerves, or mental health.  Lung problems  · Inflammation around the lungs (pleurisy)  · Collapsed lung (pneumothorax)  · Fluid around the lungs (pleural effusion)  · Lung cancer (a rare cause of chest pain)  Muscle or bone problems  · Inflamed cartilage between the ribs (costochondritis)  · Fibromyalgia  · Rheumatoid arthritis  · Chest wall strain  Digestive system problems  · Reflux  · Stomach ulcer  · Spasms of the esophagus  · Gall stones  · Gallbladder inflammation  Mental health conditions  · Panic or anxiety attacks  · Emotional distress  Your condition doesnt seem serious and your pain doesnt appear to be coming from your heart. But sometimes the signs of a serious problem take more time to appear. Watch for the warning signs listed below.  Home care  Follow these guidelines when caring for yourself at home:  · Rest today and avoid strenuous activity.  · Take any prescribed medicine as directed.  Follow-up care  Follow up with your healthcare provider, or as advised, if you dont start  to feel better within 24 hours.  When to seek medical advice  Call your healthcare provider right away if any of these occur:  · A change in the type of pain. Call if it feels different, becomes more serious, lasts longer, or begins to spread into your shoulder, arm, neck, jaw, or back.  · Shortness of breath  · You feel more pain when you breathe  · Cough with dark-colored mucus or blood  · Weakness, dizziness, or fainting  · Fever of 100.4ºF (38ºC) or higher, or as directed by your healthcare provider  · Swelling, pain, or redness in one leg  Date Last Reviewed: 12/1/2016  © 1249-3502 [employer]. [address] [place] [address], [place] [address]. All rights reserved. This information is not intended as a substitute for professional medical care. Always follow your healthcare professional's instructions.

## 2018-12-07 NOTE — PROGRESS NOTES
"Subjective:       Patient ID: Gisella Bennett is a 48 y.o. female.    Vitals:  height is 5' 4" (1.626 m) and weight is 101.2 kg (223 lb). Her temperature is 98.4 °F (36.9 °C). Her blood pressure is 151/103 (abnormal) and her pulse is 109. Her respiration is 16 and oxygen saturation is 100%.     Chief Complaint: Palpitations    Patient is local. States a month ago started steroids for her eye (uveitis). Called her pcp and told her she was having heart palpations time to time and pcp states it was a side effect of steroid. A week ago started lowering steroid dose and still having the palpations (dose complete). States she doesn't feel normal. States this last week has been traveling a lot with work and stressed. Oral steroid was stopped 2 days ago.    Feels flushed, bounding heart and palpitations.    Denies previous Hx of HTN, MI, stroke or other lung or heart related chronic illnesses.  No family Hx of sudden cardiac death.      Palpitations    This is a new problem. The current episode started more than 1 month ago. The problem occurs intermittently. The problem has been unchanged. Associated symptoms include an irregular heartbeat. Pertinent negatives include no anxiety, chest fullness, chest pain, coughing, diaphoresis, dizziness, fever, malaise/fatigue, nausea, near-syncope, numbness, shortness of breath, syncope, vomiting or weakness. She has tried nothing for the symptoms. There are no known risk factors. There is no history of anemia, anxiety, drug use, heart disease, hyperthyroidism or a valve disorder.       Constitution: Negative for chills, sweating, fatigue, fever and international travel in last 60 days.   HENT: Negative for trouble swallowing.    Neck: Negative for neck pain.   Cardiovascular: Positive for palpitations. Negative for chest trauma, chest pain, leg swelling and passing out.   Respiratory: Negative for sleep apnea, cough and shortness of breath.    Gastrointestinal: Negative for " abdominal pain, nausea, vomiting and heartburn.   Genitourinary: Negative for history of kidney stones.   Musculoskeletal: Negative for back pain.   Skin: Negative for rash.   Neurological: Negative for dizziness, light-headedness, passing out and numbness.   Hematologic/Lymphatic: Negative for history of blood clots.   Psychiatric/Behavioral: Negative for nervous/anxious. The patient is not nervous/anxious.        Objective:      Physical Exam   Constitutional: She is oriented to person, place, and time. She appears well-developed and well-nourished. She is cooperative.  Non-toxic appearance. She does not appear ill. No distress.   HENT:   Head: Normocephalic and atraumatic.   Right Ear: Hearing, tympanic membrane, external ear and ear canal normal.   Left Ear: Hearing, tympanic membrane, external ear and ear canal normal.   Nose: Nose normal. No mucosal edema, rhinorrhea or nasal deformity. No epistaxis. Right sinus exhibits no maxillary sinus tenderness and no frontal sinus tenderness. Left sinus exhibits no maxillary sinus tenderness and no frontal sinus tenderness.   Mouth/Throat: Uvula is midline and mucous membranes are normal. No trismus in the jaw. Normal dentition. No uvula swelling. No posterior oropharyngeal erythema.   Eyes: Conjunctivae and lids are normal. Right eye exhibits no discharge. Left eye exhibits no discharge. No scleral icterus.   Sclera clear bilat   Neck: Trachea normal, normal range of motion, full passive range of motion without pain and phonation normal. Neck supple.   Cardiovascular: Normal rate, regular rhythm, S1 normal, S2 normal, normal heart sounds and intact distal pulses. Exam reveals no gallop.   No murmur heard.  Pulses:       Carotid pulses are 3+ on the right side, and 2+ on the left side.       Radial pulses are 3+ on the right side, and 3+ on the left side.        Posterior tibial pulses are 2+ on the right side, and 2+ on the left side.   Bounding rhythm    Pulmonary/Chest: Effort normal and breath sounds normal. No stridor. No respiratory distress. She has no decreased breath sounds. She has no wheezes.   Abdominal: Soft. Normal appearance and bowel sounds are normal. She exhibits no distension, no pulsatile midline mass and no mass. There is no tenderness.   Musculoskeletal: Normal range of motion. She exhibits no edema or deformity.   Neurological: She is alert and oriented to person, place, and time. She is not disoriented. She displays no tremor. No sensory deficit. She exhibits normal muscle tone. Coordination and gait normal.   Skin: Skin is warm, dry and intact. She is not diaphoretic. No pallor.   Psychiatric: She has a normal mood and affect. Her speech is normal and behavior is normal. Judgment and thought content normal. Cognition and memory are normal.   Nursing note and vitals reviewed.      EKG: No STEMI; Normal sinus rhythm; HR 82 bpm, QRS 70 ms; no previous EKG on file    Normal sinus rhythm  Normal ECG  No previous ECGs available  Confirmed by Jorgito Burroughs MD ([id number]) on 12/8/2018 2:07:19 PM  Also confirmed by Jorgito Burroughs MD ([id number]),  JEANNIE KATZ ([id number])  on  12/10/2018 12:30:54 PM  Assessment:       1. Medication side effect    2. Palpitations        Plan:       Palpitations and bounding heart rate likely side effect of prolonged steroid use. Steroid no discontinued and recommended follow up with her PCP for another B/P check.    Medication side effect    Palpitations  -     IN OFFICE EKG 12-LEAD (to Muse)      Patient Instructions     Please follow up with your Primary Provider in the next week for your elevated blood pressure.    If your symptoms worsen, you feel weak, faint, dizzy, chest or back pain or shortness of breath, please go immediately to the Emergency Department.    Noncardiac Chest Pain    Based on your visit today, the healthcare provider doesnt know what is causing your chest pain. In most cases, people who come to the  emergency department with chest pain dont have a problem with their heart. Instead, the pain is caused by other conditions. It's important for the healthcare team to be sure you are not having a life threatening cause for chest pain such as a heart attack, blood clot in the lungs, collapsed lung, ruptured esophagus, or tearing of the aorta. Once these major causes have been ruled out, you may have further evaluation for non-heart causes of chest pain. These may be problems with the lungs, muscles, bones, digestive tract, nerves, or mental health.  Lung problems  · Inflammation around the lungs (pleurisy)  · Collapsed lung (pneumothorax)  · Fluid around the lungs (pleural effusion)  · Lung cancer (a rare cause of chest pain)  Muscle or bone problems  · Inflamed cartilage between the ribs (costochondritis)  · Fibromyalgia  · Rheumatoid arthritis  · Chest wall strain  Digestive system problems  · Reflux  · Stomach ulcer  · Spasms of the esophagus  · Gall stones  · Gallbladder inflammation  Mental health conditions  · Panic or anxiety attacks  · Emotional distress  Your condition doesnt seem serious and your pain doesnt appear to be coming from your heart. But sometimes the signs of a serious problem take more time to appear. Watch for the warning signs listed below.  Home care  Follow these guidelines when caring for yourself at home:  · Rest today and avoid strenuous activity.  · Take any prescribed medicine as directed.  Follow-up care  Follow up with your healthcare provider, or as advised, if you dont start to feel better within 24 hours.  When to seek medical advice  Call your healthcare provider right away if any of these occur:  · A change in the type of pain. Call if it feels different, becomes more serious, lasts longer, or begins to spread into your shoulder, arm, neck, jaw, or back.  · Shortness of breath  · You feel more pain when you breathe  · Cough with dark-colored mucus or blood  · Weakness,  dizziness, or fainting  · Fever of 100.4ºF (38ºC) or higher, or as directed by your healthcare provider  · Swelling, pain, or redness in one leg  Date Last Reviewed: 12/1/2016  © 9128-2718 [employer]. [address] [place] [address]. All rights reserved. This information is not intended as a substitute for professional medical care. Always follow your healthcare professional's instructions.

## 2018-12-10 ENCOUNTER — TELEPHONE (OUTPATIENT)
Dept: URGENT CARE | Facility: CLINIC | Age: 48
End: 2018-12-10

## 2018-12-10 NOTE — TELEPHONE ENCOUNTER
Called to follow up with patient. States she is feeling a little better but woke up with palpitations again this morning. Informed her if she gets SOB and symptoms worsen go to ER but to follow up with her PCP. Patient agreed and is going to make an linda. With her PCP.

## 2018-12-14 ENCOUNTER — OFFICE VISIT (OUTPATIENT)
Dept: INTERNAL MEDICINE | Facility: CLINIC | Age: 48
End: 2018-12-14
Payer: COMMERCIAL

## 2018-12-14 VITALS
WEIGHT: 223.13 LBS | RESPIRATION RATE: 18 BRPM | SYSTOLIC BLOOD PRESSURE: 126 MMHG | TEMPERATURE: 99 F | HEIGHT: 64 IN | DIASTOLIC BLOOD PRESSURE: 86 MMHG | BODY MASS INDEX: 38.09 KG/M2 | HEART RATE: 89 BPM

## 2018-12-14 DIAGNOSIS — J30.2 SEASONAL ALLERGIC RHINITIS, UNSPECIFIED TRIGGER: ICD-10-CM

## 2018-12-14 DIAGNOSIS — G47.00 INSOMNIA, UNSPECIFIED TYPE: ICD-10-CM

## 2018-12-14 DIAGNOSIS — R00.2 HEART PALPITATIONS: ICD-10-CM

## 2018-12-14 DIAGNOSIS — I10 ESSENTIAL HYPERTENSION: Primary | ICD-10-CM

## 2018-12-14 PROCEDURE — 99999 PR PBB SHADOW E&M-EST. PATIENT-LVL III: CPT | Mod: PBBFAC,,, | Performed by: INTERNAL MEDICINE

## 2018-12-14 PROCEDURE — 3008F BODY MASS INDEX DOCD: CPT | Mod: CPTII,S$GLB,, | Performed by: INTERNAL MEDICINE

## 2018-12-14 PROCEDURE — 99214 OFFICE O/P EST MOD 30 MIN: CPT | Mod: S$GLB,,, | Performed by: INTERNAL MEDICINE

## 2018-12-14 RX ORDER — HYDROXYZINE HYDROCHLORIDE 50 MG/1
50 TABLET, FILM COATED ORAL NIGHTLY PRN
Qty: 15 TABLET | Refills: 0 | Status: SHIPPED | OUTPATIENT
Start: 2018-12-14 | End: 2019-03-04

## 2018-12-14 RX ORDER — LEVOCETIRIZINE DIHYDROCHLORIDE 5 MG/1
5 TABLET, FILM COATED ORAL NIGHTLY
Qty: 30 TABLET | Refills: 0 | COMMUNITY
Start: 2018-12-14 | End: 2020-03-02

## 2018-12-14 RX ORDER — HYDROCHLOROTHIAZIDE 12.5 MG/1
12.5 TABLET ORAL DAILY
Qty: 30 TABLET | Refills: 0 | Status: SHIPPED | OUTPATIENT
Start: 2018-12-14 | End: 2018-12-28 | Stop reason: SDUPTHER

## 2018-12-14 NOTE — PROGRESS NOTES
Subjective:       Patient ID: Gisella Bennett is a 48 y.o. female who presents for Follow-up (Urgent care-heart palpitations- EKG was normal); Establish Care; Palpitations (last Friday- off & on); and Hypertension    She is here with her .      Palpitations    This is a new problem. The current episode started in the past 7 days. The problem occurs intermittently. The problem has been resolved. The symptoms are aggravated by stress. Associated symptoms include anxiety (situational, had therapist in past after a divorce). Pertinent negatives include no chest pain, coughing, dizziness, fever, irregular heartbeat, nausea, numbness, shortness of breath or vomiting. She has tried nothing for the symptoms. Risk factors include family history, sedentary lifestyle and stress. Her past medical history is significant for anxiety. There is no history of anemia, heart disease or hyperthyroidism.   Anxiety   Presents for initial visit. Onset was at an unknown time. The problem has been unchanged. Symptoms include excessive worry, insomnia, irritability and nervous/anxious behavior (situational, had therapist in past after a divorce). Patient reports no chest pain, depressed mood, dizziness, nausea, palpitations, panic or shortness of breath. Symptoms occur occasionally. The severity of symptoms is mild. The quality of sleep is fair. Nighttime awakenings: several.     Risk factors include recent illness. Her past medical history is significant for anxiety/panic attacks. There is no history of anemia, depression or hyperthyroidism. Past treatments include counseling (CBT).   Hypertension   This is a new problem. The current episode started more than 1 month ago. The problem is unchanged. The problem is controlled. Associated symptoms include anxiety. Pertinent negatives include no chest pain, headaches, palpitations or shortness of breath. There are no associated agents to hypertension. Risk factors for coronary  artery disease include family history. Past treatments include nothing. Compliance problems include exercise.  There is no history of kidney disease.      Patient was seen in urgent care on 12/7/18 for palpitations and elevated blood pressure after treatment with steroids for uveitis. EKG was normal. No palpitations today.      Review of Systems   Constitutional: Positive for irritability. Negative for chills and fever.   HENT: Negative for congestion, rhinorrhea and sinus pressure.    Eyes: Negative for redness and visual disturbance.   Respiratory: Negative for cough and shortness of breath.    Cardiovascular: Negative for chest pain, palpitations and leg swelling.   Gastrointestinal: Negative for abdominal pain, constipation, diarrhea, nausea and vomiting.   Genitourinary: Negative for dysuria and hematuria.   Musculoskeletal: Negative for arthralgias and myalgias.   Skin: Negative for rash.   Neurological: Negative for dizziness, numbness and headaches.   Psychiatric/Behavioral: Positive for sleep disturbance (in the last few months she is able to get to sleep but wakes up and cannot return to sleep). Negative for dysphoric mood. The patient is nervous/anxious (situational, had therapist in past after a divorce) and has insomnia.         Panic attacks in the past but none recently       Objective:      Physical Exam   Constitutional: She is oriented to person, place, and time. Vital signs are normal. She appears well-developed and well-nourished. No distress.   HENT:   Head: Normocephalic and atraumatic.   Right Ear: Hearing and external ear normal.   Left Ear: Hearing and external ear normal.   Nose: Nose normal.   Mouth/Throat: Uvula is midline.   Eyes: Lids are normal.   Neck: Full passive range of motion without pain.   Cardiovascular: Normal rate, regular rhythm, normal heart sounds and intact distal pulses.   No murmur heard.  Pulmonary/Chest: Effort normal and breath sounds normal. She has no wheezes.    Abdominal: Soft. Bowel sounds are normal. She exhibits no distension. There is no tenderness.   Musculoskeletal: Normal range of motion. She exhibits no edema.   Neurological: She is alert and oriented to person, place, and time.   Skin: Skin is warm, dry and intact. No rash noted. She is not diaphoretic.   Psychiatric: She has a normal mood and affect.   Vitals reviewed.      Assessment/Plan:         1. Essential hypertension  - monitor BP, limit sodium to <2g/day  - hydroCHLOROthiazide (HYDRODIURIL) 12.5 MG Tab; Take 1 tablet (12.5 mg total) by mouth once daily.  Dispense: 30 tablet; Refill: 0  - increase physical activity    2. Heart palpitations  - resolved, off prednisone  - call if returns    3. Seasonal allergic rhinitis, unspecified trigger  - levocetirizine (XYZAL) 5 MG tablet; Take 1 tablet (5 mg total) by mouth every evening.  Dispense: 30 tablet; Refill: 0    4. Insomnia, unspecified type  - hydrOXYzine (ATARAX) 50 MG tablet; Take 1 tablet (50 mg total) by mouth nightly as needed (sleep).  Dispense: 15 tablet; Refill: 0    Vijaya Hale MD

## 2018-12-16 PROBLEM — G47.00 INSOMNIA: Status: ACTIVE | Noted: 2018-12-16

## 2018-12-17 PROCEDURE — 99285 EMERGENCY DEPT VISIT HI MDM: CPT | Mod: 25

## 2018-12-17 PROCEDURE — 93010 ELECTROCARDIOGRAM REPORT: CPT | Mod: ,,, | Performed by: INTERNAL MEDICINE

## 2018-12-17 PROCEDURE — 99284 EMERGENCY DEPT VISIT MOD MDM: CPT | Mod: ,,, | Performed by: EMERGENCY MEDICINE

## 2018-12-17 PROCEDURE — 93005 ELECTROCARDIOGRAM TRACING: CPT

## 2018-12-17 PROCEDURE — 96360 HYDRATION IV INFUSION INIT: CPT

## 2018-12-18 ENCOUNTER — HOSPITAL ENCOUNTER (EMERGENCY)
Facility: HOSPITAL | Age: 48
Discharge: HOME | End: 2018-12-18
Attending: EMERGENCY MEDICINE
Payer: COMMERCIAL

## 2018-12-18 VITALS
SYSTOLIC BLOOD PRESSURE: 116 MMHG | WEIGHT: 220 LBS | DIASTOLIC BLOOD PRESSURE: 77 MMHG | HEART RATE: 104 BPM | HEIGHT: 64 IN | OXYGEN SATURATION: 98 % | RESPIRATION RATE: 16 BRPM | BODY MASS INDEX: 37.56 KG/M2 | TEMPERATURE: 98 F

## 2018-12-18 DIAGNOSIS — R07.9 CHEST PAIN: ICD-10-CM

## 2018-12-18 DIAGNOSIS — R00.0 TACHYCARDIA: ICD-10-CM

## 2018-12-18 LAB
ALBUMIN SERPL BCP-MCNC: 3.6 G/DL
ALP SERPL-CCNC: 102 U/L
ALT SERPL W/O P-5'-P-CCNC: 30 U/L
ANION GAP SERPL CALC-SCNC: 10 MMOL/L
AST SERPL-CCNC: 28 U/L
BASOPHILS # BLD AUTO: 0.06 K/UL
BASOPHILS NFR BLD: 0.7 %
BILIRUB SERPL-MCNC: 0.4 MG/DL
BUN SERPL-MCNC: 14 MG/DL
CALCIUM SERPL-MCNC: 9.7 MG/DL
CHLORIDE SERPL-SCNC: 102 MMOL/L
CO2 SERPL-SCNC: 25 MMOL/L
CREAT SERPL-MCNC: 1 MG/DL
D DIMER PPP IA.FEU-MCNC: 0.47 MG/L FEU
DIFFERENTIAL METHOD: ABNORMAL
EOSINOPHIL # BLD AUTO: 0.1 K/UL
EOSINOPHIL NFR BLD: 1 %
ERYTHROCYTE [DISTWIDTH] IN BLOOD BY AUTOMATED COUNT: 15.2 %
EST. GFR  (AFRICAN AMERICAN): >60 ML/MIN/1.73 M^2
EST. GFR  (NON AFRICAN AMERICAN): >60 ML/MIN/1.73 M^2
GLUCOSE SERPL-MCNC: 90 MG/DL
HCT VFR BLD AUTO: 39.3 %
HGB BLD-MCNC: 13 G/DL
IMM GRANULOCYTES # BLD AUTO: 0.03 K/UL
IMM GRANULOCYTES NFR BLD AUTO: 0.3 %
LYMPHOCYTES # BLD AUTO: 1.6 K/UL
LYMPHOCYTES NFR BLD: 18.5 %
MAGNESIUM SERPL-MCNC: 2.5 MG/DL
MCH RBC QN AUTO: 28.9 PG
MCHC RBC AUTO-ENTMCNC: 33.1 G/DL
MCV RBC AUTO: 87 FL
MONOCYTES # BLD AUTO: 0.7 K/UL
MONOCYTES NFR BLD: 7.5 %
NEUTROPHILS # BLD AUTO: 6.3 K/UL
NEUTROPHILS NFR BLD: 72 %
NRBC BLD-RTO: 0 /100 WBC
PLATELET # BLD AUTO: 339 K/UL
PMV BLD AUTO: 11.6 FL
POTASSIUM SERPL-SCNC: 4.3 MMOL/L
PROT SERPL-MCNC: 8.5 G/DL
RBC # BLD AUTO: 4.5 M/UL
SODIUM SERPL-SCNC: 137 MMOL/L
WBC # BLD AUTO: 8.81 K/UL

## 2018-12-18 PROCEDURE — 25000003 PHARM REV CODE 250: Performed by: EMERGENCY MEDICINE

## 2018-12-18 PROCEDURE — 85379 FIBRIN DEGRADATION QUANT: CPT

## 2018-12-18 PROCEDURE — 85025 COMPLETE CBC W/AUTO DIFF WBC: CPT

## 2018-12-18 PROCEDURE — 80053 COMPREHEN METABOLIC PANEL: CPT

## 2018-12-18 PROCEDURE — 83735 ASSAY OF MAGNESIUM: CPT

## 2018-12-18 RX ADMIN — SODIUM CHLORIDE 1000 ML: 0.9 INJECTION, SOLUTION INTRAVENOUS at 01:12

## 2018-12-18 NOTE — ED TRIAGE NOTES
"Gisella Bennett, a 48 y.o. female presents to the ED   Triage note:  Chief Complaint   Patient presents with    Chest Pain     Pt states "I have been taking steroids for about a month and it caused my heart rate and blood pressure to go up". Pt reports 3/10 midsternal chest pressure and a headache earlier today. Pt denies SOB, headache currently. Pt denies cardiac history, pt went to  where EKG presented normal.       Pt denies chest pain but does c/o palpitations. Denies SOB. She states she started taking HCTZ over the weekend. She states that she has been taking in an adequate amount of fluids.       Review of patient's allergies indicates:   Allergen Reactions    Codeine Other (See Comments)     Tremors    Nuts [tree nut] Anaphylaxis    Avocado (laurus persea) Itching, Nausea And Vomiting and Other (See Comments)     Past Medical History:   Diagnosis Date    Atopic dermatitis     Environmental allergies 3/29/2016    Lower back pain 3/29/2016    Trigeminal neuralgia 4/14/2015    Uveitis of right eye 2003, 2018    Vitamin D deficiency disease 2/16/2015     "

## 2018-12-18 NOTE — ED PROVIDER NOTES
"Encounter Date: 12/17/2018    SCRIBE #1 NOTE: I, Chris Vega, am scribing for, and in the presence of,  Dr. FLORA Coyle. I have scribed the entire note.       History     Chief Complaint   Patient presents with    Chest Pain     Pt states "I have been taking steroids for about a month and it caused my heart rate and blood pressure to go up". Pt reports 3/10 midsternal chest pressure and a headache earlier today. Pt denies SOB, headache currently. Pt denies cardiac history, pt went to  where EKG presented normal.     Time patient was seen by the provider: 12:56 AM      The patient is a 48 y.o. female who presents to the ED with a complaint of chest pain. Pt states she began taking steroids at the beginning of December. States she misunderstood the dosage she was supposed to be taking and associated that to her rapid heartbeat. States she went to an urgent care on Dec 7th where an EKG was performed that found no abnormal findings, they advised her to drink plenty of fluids to flush out the steroids she took. Pt reports she denies pain but occasionally feels chest pressure. States symptoms come and go. Denies fainting spells, SOB, or cigarette smoking.             Review of patient's allergies indicates:   Allergen Reactions    Codeine Other (See Comments)     Tremors    Nuts [tree nut] Anaphylaxis    Avocado (laurus persea) Itching, Nausea And Vomiting and Other (See Comments)     Past Medical History:   Diagnosis Date    Atopic dermatitis     Environmental allergies 3/29/2016    Lower back pain 3/29/2016    Trigeminal neuralgia 4/14/2015    Uveitis of right eye 2003, 2018    Vitamin D deficiency disease 2/16/2015     Past Surgical History:   Procedure Laterality Date    INTRAUTERINE DEVICE INSERTION  2013    MIRENA    SPINAL FUSION  04/2013    Spinal fusion, laminectomies     Family History   Problem Relation Age of Onset    Heart disease Father 56        congenital defect, aortic replacement    " Kidney disease Father         after Cardiac study     Diabetes Mellitus Father     Thyroid disease Mother         Goiter removed    No Known Problems Paternal Grandfather     No Known Problems Paternal Grandmother     No Known Problems Maternal Grandmother     No Known Problems Maternal Grandfather     Eczema Brother     Breast cancer Maternal Aunt     Sarcoidosis Cousin 55        lung involvement    Asthma Neg Hx     Emphysema Neg Hx     Blindness Neg Hx     Glaucoma Neg Hx     Macular degeneration Neg Hx     Retinal detachment Neg Hx     Inflammatory bowel disease Neg Hx     Lupus Neg Hx     Psoriasis Neg Hx     Rheum arthritis Neg Hx      Social History     Tobacco Use    Smoking status: Never Smoker    Smokeless tobacco: Never Used    Tobacco comment: Philanthropy;     Substance Use Topics    Alcohol use: Yes     Comment: one glass twice a week at the most    Drug use: No     Review of Systems   Constitutional: Negative for chills and fever.   HENT: Negative for sore throat.    Cardiovascular: Positive for palpitations. Negative for chest pain.   Gastrointestinal: Negative for abdominal pain, diarrhea, nausea and vomiting.   Genitourinary: Negative for dysuria and hematuria.   Musculoskeletal: Negative for back pain.   Neurological: Negative for dizziness, weakness, numbness and headaches.       Physical Exam     Initial Vitals [12/17/18 2337]   BP Pulse Resp Temp SpO2   (!) 137/93 (!) 123 16 98.2 °F (36.8 °C) 100 %      MAP       --         Physical Exam    Nursing note and vitals reviewed.    Appearance: No acute distress.  Skin: No rashes seen.  Good turgor.  No abrasions.  No ecchymoses.  Eyes: No conjunctival injection.  ENT: Oropharynx clear.    Chest: Clear to auscultation bilaterally.  Good air movement.  No wheezes.  No rhonchi.  Cardiovascular: Regular rate and rhythm.  No murmurs. No gallops. No rubs.  Abdomen: Soft.  Not distended.  Nontender.  No guarding.  No rebound.  No Masses  Musculoskeletal: Good range of motion all joints.  No deformities.  Neck supple.  No meningismus.  Neurologic: Equal strength in upper and lower extremities bilaterally.  Normal sensation.  No facial droop.  Normal speech.    Mental Status:  Alert and oriented x 3.  Appropriate, conversant.            ED Course   Procedures  Labs Reviewed - No data to display       Imaging Results    None          Medical Decision Making:   Initial Assessment:   Pt here for palpitations and tachycardia, no obvious cause. Pt does not appear anxious but admits she's under a lot of stress. Chest xray unremarkable. D-dimer negative. Do not suspect PE or pneumonia. Improved with fluids. Unclear cause but no emergent etiology found, stable for outpatient cardiology follow up.    Advised pt to follow up with PCP or return if concerning symptoms arise. Pt understands and agrees with plan. Will d/c home.                        Clinical Impression:   Diagnoses of Chest pain and Tachycardia were pertinent to this visit.                             Darell Coyle MD  12/18/18 2070

## 2018-12-20 ENCOUNTER — OFFICE VISIT (OUTPATIENT)
Dept: OPTOMETRY | Facility: CLINIC | Age: 48
End: 2018-12-20
Payer: COMMERCIAL

## 2018-12-20 DIAGNOSIS — H21.561 PUPIL IRREGULAR OF RIGHT EYE: Primary | ICD-10-CM

## 2018-12-20 DIAGNOSIS — H20.9 ANTERIOR UVEITIS: ICD-10-CM

## 2018-12-20 DIAGNOSIS — H52.223 REGULAR ASTIGMATISM OF BOTH EYES: ICD-10-CM

## 2018-12-20 DIAGNOSIS — H20.9 UVEITIS: ICD-10-CM

## 2018-12-20 PROCEDURE — 99999 PR PBB SHADOW E&M-EST. PATIENT-LVL II: CPT | Mod: PBBFAC,,, | Performed by: OPTOMETRIST

## 2018-12-20 PROCEDURE — 92012 INTRM OPH EXAM EST PATIENT: CPT | Mod: S$GLB,,, | Performed by: OPTOMETRIST

## 2018-12-20 NOTE — PROGRESS NOTES
HPI     DLS;  11/27/18    No eyedrops    Pt states VA OD is blurry.  Pt denies pain OU.      Last edited by Anabel Thomason MA on 12/20/2018 10:32 AM.   (History)            Assessment /Plan     For exam results, see Encounter Report.    Pupil irregular of right eye  Uveitis  Anterior uveitis   Resolved, pt recently finished oral steroids and steroid drops    Regular astigmatism of both eyes   BCVA 20/20 OU with correction     RTC 1 month for DFE and refraction

## 2018-12-28 ENCOUNTER — OFFICE VISIT (OUTPATIENT)
Dept: INTERNAL MEDICINE | Facility: CLINIC | Age: 48
End: 2018-12-28
Payer: COMMERCIAL

## 2018-12-28 VITALS
SYSTOLIC BLOOD PRESSURE: 120 MMHG | TEMPERATURE: 98 F | HEART RATE: 89 BPM | OXYGEN SATURATION: 98 % | RESPIRATION RATE: 18 BRPM | DIASTOLIC BLOOD PRESSURE: 84 MMHG | HEIGHT: 64 IN | BODY MASS INDEX: 37.9 KG/M2 | WEIGHT: 222 LBS

## 2018-12-28 DIAGNOSIS — R00.2 HEART PALPITATIONS: ICD-10-CM

## 2018-12-28 DIAGNOSIS — E66.01 SEVERE OBESITY (BMI 35.0-39.9) WITH COMORBIDITY: ICD-10-CM

## 2018-12-28 DIAGNOSIS — Z12.11 SCREEN FOR COLON CANCER: ICD-10-CM

## 2018-12-28 DIAGNOSIS — I10 ESSENTIAL HYPERTENSION: Primary | ICD-10-CM

## 2018-12-28 PROCEDURE — 3008F BODY MASS INDEX DOCD: CPT | Mod: CPTII,S$GLB,, | Performed by: INTERNAL MEDICINE

## 2018-12-28 PROCEDURE — 99214 OFFICE O/P EST MOD 30 MIN: CPT | Mod: S$GLB,,, | Performed by: INTERNAL MEDICINE

## 2018-12-28 PROCEDURE — 99999 PR PBB SHADOW E&M-EST. PATIENT-LVL IV: CPT | Mod: PBBFAC,,, | Performed by: INTERNAL MEDICINE

## 2018-12-28 RX ORDER — HYDROCHLOROTHIAZIDE 12.5 MG/1
12.5 TABLET ORAL DAILY
Qty: 30 TABLET | Refills: 2 | Status: SHIPPED | OUTPATIENT
Start: 2018-12-28 | End: 2019-04-10 | Stop reason: SDUPTHER

## 2018-12-28 NOTE — PROGRESS NOTES
"     Cardiology Clinic Note  Reason for Visit: palpitations    HPI:     Gisella Bennett is a 48 y.o. F with HTN, who presents for follow up after recent visit to the ED for palpitations    She reports having iritis in 8/2018. She was started on steroid and other eye drops. Symptoms continued, so she was placed on prednisone BID. She was supposed to drop this to daily after one week, but she misunderstood and continued BID steroids for three weeks. During the third week, she began having rapid heart rates and sensation of pounding in her chest. She eventually tapered down the steroids and discontinued use. Since that time, she continues to have nearly daily episodes of palpitations. Her heart rate has been 130-140s on her fit bit during episodes. Episodes begin acutely and gradually taper off.     She was previously exercising by walking 4.5-5 miles 6-7 days per week. Recently, she has been walking 3 days per week. She is now feeling "off" after 2 miles or so. No chest pain or dyspnea. She does feel a fullness in her head. No heart failure symptoms. She gained 20lb since starting steroids and was recently started on HCTZ for new HTN. She monitors her vitals at home. BP 110s-140/70s-90. Heart rates 70 (rare) to 110s.     To note, her father was diagnosed with a congenital heart defect prior to his death. He was pushing his truck and had an aortic dissection. Emergency surgery involved repair of the dissection as well as a valve replacement surgery. This occurred at age 48yo. Possible a bicuspid aortic valve complicated by dissection of the aortic root.    Medical: HTN (diagnosed 2018), GERD  Surgical: spinal fusion, ankle surgery  Family: heart disease, kidney disease, thyroid disease, DM  Social: never smoked, at most two glasses of wine per week    ROS:    Constitution: Negative for fever or chills.  HENT: Negative for  headaches.  Eyes: Negative for blurred vision.   Cardiovascular: See above  Pulmonary: " Negative for SOB. Negative for cough.   Gastrointestinal: Negative for nausea/vomiting.   : Negative for dysuria.   Skin: Negative for rashes.  Neurological: Negative for focal weakness.  Psychological: Negative for depression.  PMH:     Past Medical History:   Diagnosis Date    Atopic dermatitis     Environmental allergies 3/29/2016    Lower back pain 3/29/2016    Trigeminal neuralgia 4/14/2015    Uveitis of right eye 2003, 2018    Vitamin D deficiency disease 2/16/2015     Past Surgical History:   Procedure Laterality Date    INTRAUTERINE DEVICE INSERTION  2013    MIRENA    SPINAL FUSION  04/2013    Spinal fusion, laminectomies     Allergies:     Review of patient's allergies indicates:   Allergen Reactions    Codeine Other (See Comments)     Tremors    Nuts [tree nut] Anaphylaxis    Avocado (laurus persea) Itching, Nausea And Vomiting and Other (See Comments)     Medications:     Current Outpatient Medications on File Prior to Visit   Medication Sig Dispense Refill    EPIPEN 2-CHIDI 0.3 mg/0.3 mL (1:1,000) AtIn       ergocalciferol (ERGOCALCIFEROL) 50,000 unit Cap Take 1 capsule (50,000 Units total) by mouth every 7 days. 12 capsule 1    fluticasone (FLONASE) 50 mcg/actuation nasal spray 2 sprays by Each Nare route once daily.      hydroCHLOROthiazide (HYDRODIURIL) 12.5 MG Tab Take 1 tablet (12.5 mg total) by mouth once daily. 30 tablet 2    hydrOXYzine (ATARAX) 50 MG tablet Take 1 tablet (50 mg total) by mouth nightly as needed (sleep). 15 tablet 0    levocetirizine (XYZAL) 5 MG tablet Take 1 tablet (5 mg total) by mouth every evening. 30 tablet 0    levonorgestrel (MIRENA) 20 mcg/24 hr (5 years) IUD 1 each by Intrauterine route once.      omeprazole (PRILOSEC) 40 MG capsule Take 1 capsule (40 mg total) by mouth before breakfast. Take on empty stomach 30-60 minutes before meal 30 capsule 11    ranitidine (ZANTAC) 300 MG tablet Take 1 tablet (300 mg total) by mouth every evening. 30 tablet 11     [DISCONTINUED] hydroCHLOROthiazide (HYDRODIURIL) 12.5 MG Tab Take 1 tablet (12.5 mg total) by mouth once daily. 30 tablet 0     No current facility-administered medications on file prior to visit.      Social History:     Social History     Tobacco Use    Smoking status: Never Smoker    Smokeless tobacco: Never Used    Tobacco comment: Philanthropy;     Substance Use Topics    Alcohol use: Yes     Comment: one glass twice a week at the most     Family History:     Family History   Problem Relation Age of Onset    Heart disease Father 56        congenital defect, aortic replacement    Kidney disease Father         after Cardiac study     Diabetes Mellitus Father     Thyroid disease Mother         Goiter removed    No Known Problems Paternal Grandfather     No Known Problems Paternal Grandmother     No Known Problems Maternal Grandmother     No Known Problems Maternal Grandfather     Eczema Brother     Breast cancer Maternal Aunt     Sarcoidosis Cousin 55        lung involvement    Asthma Neg Hx     Emphysema Neg Hx     Blindness Neg Hx     Glaucoma Neg Hx     Macular degeneration Neg Hx     Retinal detachment Neg Hx     Inflammatory bowel disease Neg Hx     Lupus Neg Hx     Psoriasis Neg Hx     Rheum arthritis Neg Hx      Physical Exam:   /72   Pulse 88   Ht 5' (1.524 m)   Wt 101.1 kg (222 lb 12.4 oz)   LMP 12/04/2018 (Exact Date)   BMI 43.51 kg/m²      Constitutional: No apparent distress, conversant  HEENT: Sclera anicteric, extraocular movements intact  Neck: No jugular venous distension, no carotid bruits  CV: Regular rate and rhythm, no murmurs rubs or gallops, normal S1/S2  Pulm: Clear to auscultation bilaterally  GI: Abdomen soft, no palpable masses  Extremities: No lower extremity edema, warm with palpable pulses  Skin: No ecchymosis, erythema, or ulcers  Psych: Alert and oriented to person place location, appropriate affect  Neuro: No focal deficits    Labs:      Blood Tests:  Lab Results   Component Value Date     2018    K 4.3 2018     2018    CO2 25 2018    BUN 14 2018    CREATININE 1.0 2018    GLU 90 2018    HGBA1C 5.4 2015    MG 2.5 2018    AST 28 2018    ALT 30 2018    ALBUMIN 3.6 2018    PROT 8.5 (H) 2018    BILITOT 0.4 2018    WBC 8.81 2018    HGB 13.0 2018    HCT 39.3 2018    MCV 87 2018     2018    TSH 0.960 2018       Lab Results   Component Value Date    CHOL 160 2016    HDL 52 2016    TRIG 81 2016       Lab Results   Component Value Date    LDLCALC 91.8 2016       Urine Tests:  No results found for: COLORU, APPEARANCEUA, PHUR, SPECGRAV, PROTEINUA, GLUCUA, KETONESU, BILIRUBINUA, OCCULTUA, NITRITE, UROBILINOGEN, LEUKOCYTESUR, PROTEINURINE, CREATRANDUR, UTPCR    Imaging:     Echocardiogram  None    Stress testing  None    Cath Lab  None    Other  None    EK18  Sinus tachycardia  Normal ECG    Assessment:     1. Palpitations    2. Essential hypertension    3. Family history of first degree relative with bicuspid aortic valve      Plan:     Palpitations  Obtain 48h holter monitor   Echo, as below    Essential hypertension  New diagnosis after steroid use  Controlled on HCTZ 12.5mg daily  Echo for initial evaluation of HTN  Encourage weight loss and regular aerobic exercise of 30-45 min/d, 5-7x/week    Family history congenital heart condition in first degree relative (likely bicuspid aortic valve with aortic root dissection)  Obtain echocardiogram, as above, for one time screening for bicuspid aortic valve  If bicuspid aortic valve is present, will need MRA/CTA to evaluate for aortopathy    Signed:  Darron Banuelos MD  Cardiology     2018 11:06 AM    Follow-up:     Future Appointments   Date Time Provider Department Center   2018 10:00 AM Gigi Banuelos III, MD [facility] CARDIO  [place]   2/7/2019  3:15 PM Jessica Patel, OD [facility] OPTOMTY [place]   2/27/2019  8:40 AM Vijaya Hale MD [facility] IM [place]   3/18/2019  9:00 AM Paradise Bowden MD [facility] RHEUM [facility]

## 2018-12-28 NOTE — PROGRESS NOTES
Subjective:       Patient ID: Gisella Bennett is a 48 y.o. female who presents for Follow-up; Hypertension; Palpitations; and Obesity      Hypertension   This is a new problem. The current episode started 1 to 4 weeks ago. The problem has been waxing and waning since onset. Associated symptoms include malaise/fatigue and palpitations. Pertinent negatives include no anxiety, blurred vision, chest pain, headaches, neck pain, orthopnea, peripheral edema, PND, shortness of breath or sweats. There are no associated agents to hypertension. Risk factors for coronary artery disease include obesity and stress. Past treatments include diuretics. The current treatment provides mild improvement. Compliance problems include medication side effects.    Palpitations    This is a recurrent problem. The problem occurs intermittently. The problem has been waxing and waning. Exacerbated by: may have been triggered by steroid. Associated symptoms include malaise/fatigue. Pertinent negatives include no anxiety, chest fullness, chest pain, coughing, diaphoresis, dizziness, fever, nausea, shortness of breath or weakness. She has tried nothing for the symptoms. Risk factors include obesity. There is no history of anemia.      Body mass index is 38.11 kg/m².    Patient reports an increase in weight over the last year and usual weight had been ~150's but today is 222 lbs. Does not use salt and avoids fast food. She now walks regularly and does Pilates and Yoga.       Review of Systems   Constitutional: Positive for malaise/fatigue. Negative for chills, diaphoresis and fever.   HENT: Negative for congestion, ear pain and sore throat.    Eyes: Negative for blurred vision.   Respiratory: Negative for cough, chest tightness, shortness of breath and wheezing.    Cardiovascular: Positive for palpitations. Negative for chest pain, orthopnea and PND.   Gastrointestinal: Negative for abdominal pain, constipation, diarrhea and nausea.    Musculoskeletal: Negative for arthralgias, back pain and neck pain.   Skin: Negative for color change and rash.   Neurological: Negative for dizziness, weakness and headaches.   Psychiatric/Behavioral: Positive for sleep disturbance (atarax works but notices grogginess the next morning). The patient is not nervous/anxious.          Answers for HPI/ROS submitted by the patient on 12/28/2018   Hypertension  Chronicity: new  Onset: 1 to 4 weeks ago  Progression since onset: waxing and waning  anxiety: No  blurred vision: No  chest pain: No  headaches: No  malaise/fatigue: Yes  neck pain: No  orthopnea: No  palpitations: Yes  peripheral edema: No  PND: No  shortness of breath: No  sweats: No  Agents associated with hypertension: no associated agents  CAD risks: obesity, stress  Compliance problems: medication side effects  Past treatments: diuretics  Improvement on treatment: mild        Objective:      Physical Exam   Constitutional: She is oriented to person, place, and time. Vital signs are normal. She appears well-developed and well-nourished. No distress.   HENT:   Head: Normocephalic and atraumatic.   Right Ear: Hearing and external ear normal.   Left Ear: Hearing and external ear normal.   Nose: Nose normal.   Mouth/Throat: Uvula is midline.   Eyes: Lids are normal.   Cardiovascular: Normal rate, regular rhythm, normal heart sounds and intact distal pulses.   No murmur heard.  Pulmonary/Chest: Effort normal and breath sounds normal. She has no wheezes.   Abdominal: Soft. Bowel sounds are normal. She exhibits no distension. There is no tenderness.   Musculoskeletal: Normal range of motion. She exhibits no edema.   Neurological: She is alert and oriented to person, place, and time.   Skin: Skin is warm, dry and intact. No rash noted. She is not diaphoretic.   Psychiatric: She has a normal mood and affect.   Vitals reviewed.      Assessment/Plan:         1. Essential hypertension  - BP is controlled, continue  HCTZ  - hydroCHLOROthiazide (HYDRODIURIL) 12.5 MG Tab; Take 1 tablet (12.5 mg total) by mouth once daily.  Dispense: 30 tablet; Refill: 2  - patient would like to eventually stop HCTZ with lifestyle modification    2. Heart palpitations  - persistent, intermittent, 140-150's at times, ER workup showed no abnormalities  - Ambulatory Referral to Cardiology    3. Screen for colon cancer  - Case request GI: COLONOSCOPY    4. Severe obesity  - continue exercise routine and healthy diet    RTC in 2 months or sooner if needed    Vijaya Hale MD

## 2018-12-29 PROBLEM — E66.01 SEVERE OBESITY (BMI 35.0-39.9) WITH COMORBIDITY: Status: ACTIVE | Noted: 2018-12-29

## 2018-12-31 ENCOUNTER — CLINICAL SUPPORT (OUTPATIENT)
Dept: CARDIOLOGY | Facility: CLINIC | Age: 48
End: 2018-12-31
Attending: INTERNAL MEDICINE
Payer: COMMERCIAL

## 2018-12-31 ENCOUNTER — OFFICE VISIT (OUTPATIENT)
Dept: CARDIOLOGY | Facility: CLINIC | Age: 48
End: 2018-12-31
Payer: COMMERCIAL

## 2018-12-31 VITALS
HEART RATE: 88 BPM | SYSTOLIC BLOOD PRESSURE: 126 MMHG | WEIGHT: 222.75 LBS | HEIGHT: 60 IN | BODY MASS INDEX: 43.73 KG/M2 | DIASTOLIC BLOOD PRESSURE: 72 MMHG

## 2018-12-31 VITALS
SYSTOLIC BLOOD PRESSURE: 120 MMHG | BODY MASS INDEX: 43.59 KG/M2 | DIASTOLIC BLOOD PRESSURE: 70 MMHG | WEIGHT: 222 LBS | HEIGHT: 60 IN | HEART RATE: 80 BPM

## 2018-12-31 DIAGNOSIS — R00.2 PALPITATIONS: ICD-10-CM

## 2018-12-31 DIAGNOSIS — I10 ESSENTIAL HYPERTENSION: ICD-10-CM

## 2018-12-31 DIAGNOSIS — Z82.79 FAMILY HISTORY OF FIRST DEGREE RELATIVE WITH BICUSPID AORTIC VALVE: ICD-10-CM

## 2018-12-31 DIAGNOSIS — R00.2 PALPITATIONS: Primary | ICD-10-CM

## 2018-12-31 LAB
ASCENDING AORTA: 3.42 CM
AV INDEX (PROSTH): 0.84
AV MEAN GRADIENT: 6.32 MMHG
AV PEAK GRADIENT: 10.5 MMHG
AV VALVE AREA: 2.7 CM2
BSA FOR ECHO PROCEDURE: 2.06 M2
CV ECHO LV RWT: 0.41 CM
DOP CALC AO PEAK VEL: 1.62 M/S
DOP CALC AO VTI: 31.22 CM
DOP CALC LVOT AREA: 3.2 CM2
DOP CALC LVOT DIAMETER: 2.02 CM
DOP CALC LVOT STROKE VOLUME: 84.24 CM3
DOP CALCLVOT PEAK VEL VTI: 26.3 CM
E WAVE DECELERATION TIME: 199.86 MSEC
E/A RATIO: 1.04
E/E' RATIO: 7.64
ECHO LV POSTERIOR WALL: 0.84 CM (ref 0.6–1.1)
FRACTIONAL SHORTENING: 30 % (ref 28–44)
INTERVENTRICULAR SEPTUM: 0.85 CM (ref 0.6–1.1)
IVRT: 0.09 MSEC
LA MAJOR: 5.21 CM
LA MINOR: 5.23 CM
LA WIDTH: 3.71 CM
LEFT ATRIUM SIZE: 3.22 CM
LEFT ATRIUM VOLUME INDEX: 27.2 ML/M2
LEFT ATRIUM VOLUME: 53.01 CM3
LEFT INTERNAL DIMENSION IN SYSTOLE: 2.89 CM (ref 2.1–4)
LEFT VENTRICLE DIASTOLIC VOLUME INDEX: 38.69 ML/M2
LEFT VENTRICLE DIASTOLIC VOLUME: 75.49 ML
LEFT VENTRICLE MASS INDEX: 54.3 G/M2
LEFT VENTRICLE SYSTOLIC VOLUME INDEX: 16.4 ML/M2
LEFT VENTRICLE SYSTOLIC VOLUME: 32.04 ML
LEFT VENTRICULAR INTERNAL DIMENSION IN DIASTOLE: 4.13 CM (ref 3.5–6)
LEFT VENTRICULAR MASS: 106.01 G
LV LATERAL E/E' RATIO: 6
LV SEPTAL E/E' RATIO: 10.5
MV PEAK A VEL: 0.81 M/S
MV PEAK E VEL: 0.84 M/S
PULM VEIN S/D RATIO: 1.69
PV PEAK D VEL: 0.36 M/S
PV PEAK S VEL: 0.61 M/S
RA MAJOR: 4.59 CM
RA PRESSURE: 3 MMHG
RA WIDTH: 3.2 CM
RIGHT VENTRICULAR END-DIASTOLIC DIMENSION: 3.43 CM
SINUS: 2.74 CM
STJ: 2.74 CM
TDI LATERAL: 0.14
TDI SEPTAL: 0.08
TDI: 0.11
TRICUSPID ANNULAR PLANE SYSTOLIC EXCURSION: 1.82 CM

## 2018-12-31 PROCEDURE — 99203 OFFICE O/P NEW LOW 30 MIN: CPT | Mod: 25,S$GLB,, | Performed by: INTERNAL MEDICINE

## 2018-12-31 PROCEDURE — 93224 HOLTER MONITOR - 48 HOUR (CUPID ONLY): ICD-10-PCS | Mod: S$GLB,,, | Performed by: INTERNAL MEDICINE

## 2018-12-31 PROCEDURE — 93224 XTRNL ECG REC UP TO 48 HRS: CPT | Mod: S$GLB,,, | Performed by: INTERNAL MEDICINE

## 2018-12-31 PROCEDURE — 99999 PR PBB SHADOW E&M-EST. PATIENT-LVL II: CPT | Mod: PBBFAC,,,

## 2018-12-31 PROCEDURE — 93306 TTE W/DOPPLER COMPLETE: CPT | Mod: S$GLB,,, | Performed by: INTERNAL MEDICINE

## 2018-12-31 PROCEDURE — 93306 TRANSTHORACIC ECHO (TTE) COMPLETE (CUPID ONLY): ICD-10-PCS | Mod: S$GLB,,, | Performed by: INTERNAL MEDICINE

## 2018-12-31 PROCEDURE — 3008F BODY MASS INDEX DOCD: CPT | Mod: CPTII,S$GLB,, | Performed by: INTERNAL MEDICINE

## 2018-12-31 PROCEDURE — 99999 PR PBB SHADOW E&M-EST. PATIENT-LVL III: CPT | Mod: PBBFAC,,, | Performed by: INTERNAL MEDICINE

## 2018-12-31 PROCEDURE — 99999 PR PBB SHADOW E&M-EST. PATIENT-LVL II: ICD-10-PCS | Mod: PBBFAC,,,

## 2018-12-31 NOTE — LETTER
December 31, 2018      Vijaya Hale MD  [address] [place] [address]           [facility] [facility]  [address] [facility]  [place] [address]  Phone: [phone]          Patient: Gisella Bennett   MR Number: [id number]   YOB: 1970   Date of Visit: 12/31/2018       Dear Dr. Vijaya Hale:    Thank you for referring Gisella Bennett to me for evaluation. Attached you will find relevant portions of my assessment and plan of care.    If you have questions, please do not hesitate to call me. I look forward to following Gisella Bennett along with you.    Sincerely,    Gigi Banuelos III, MD    Enclosure  CC:  No Recipients    If you would like to receive this communication electronically, please contact [email or web address] or [phone] to request more information on [employer] access.    For providers and/or their staff who would like to refer a patient to [facility], please contact us through our one-stop-shop provider referral line, [facility], at [phone].    If you feel you have received this communication in error or would no longer like to receive these types of communications, please e-mail [email or web address]

## 2019-01-03 LAB
OHS CV EVENT MONITOR DAY: 0
OHS CV HOLTER LENGTH DECIMAL HOURS: 48
OHS CV HOLTER LENGTH HOURS: 48
OHS CV HOLTER LENGTH MINUTES: 0

## 2019-01-04 ENCOUNTER — TELEPHONE (OUTPATIENT)
Dept: CARDIOLOGY | Facility: CLINIC | Age: 49
End: 2019-01-04

## 2019-01-04 NOTE — TELEPHONE ENCOUNTER
Discussed holter results with Ms BushBennett. Sinus rhythm during symptoms. No need for beta blockers.    Encouraged continued exercise and ensure adequate hydration.

## 2019-01-23 ENCOUNTER — OFFICE VISIT (OUTPATIENT)
Dept: INTERNAL MEDICINE | Facility: CLINIC | Age: 49
End: 2019-01-23
Payer: COMMERCIAL

## 2019-01-23 VITALS
SYSTOLIC BLOOD PRESSURE: 100 MMHG | DIASTOLIC BLOOD PRESSURE: 80 MMHG | TEMPERATURE: 98 F | BODY MASS INDEX: 44.97 KG/M2 | WEIGHT: 229.06 LBS | HEIGHT: 60 IN | RESPIRATION RATE: 20 BRPM

## 2019-01-23 DIAGNOSIS — E66.01 SEVERE OBESITY (BMI 35.0-39.9) WITH COMORBIDITY: ICD-10-CM

## 2019-01-23 DIAGNOSIS — M25.572 ACUTE LEFT ANKLE PAIN: Primary | ICD-10-CM

## 2019-01-23 DIAGNOSIS — E04.9 PALPABLE THYROID: ICD-10-CM

## 2019-01-23 PROCEDURE — 3008F BODY MASS INDEX DOCD: CPT | Mod: CPTII,S$GLB,, | Performed by: INTERNAL MEDICINE

## 2019-01-23 PROCEDURE — 99213 PR OFFICE/OUTPT VISIT, EST, LEVL III, 20-29 MIN: ICD-10-PCS | Mod: S$GLB,,, | Performed by: INTERNAL MEDICINE

## 2019-01-23 PROCEDURE — 99999 PR PBB SHADOW E&M-EST. PATIENT-LVL III: ICD-10-PCS | Mod: PBBFAC,,, | Performed by: INTERNAL MEDICINE

## 2019-01-23 PROCEDURE — 3074F SYST BP LT 130 MM HG: CPT | Mod: CPTII,S$GLB,, | Performed by: INTERNAL MEDICINE

## 2019-01-23 PROCEDURE — 3079F PR MOST RECENT DIASTOLIC BLOOD PRESSURE 80-89 MM HG: ICD-10-PCS | Mod: CPTII,S$GLB,, | Performed by: INTERNAL MEDICINE

## 2019-01-23 PROCEDURE — 3008F PR BODY MASS INDEX (BMI) DOCUMENTED: ICD-10-PCS | Mod: CPTII,S$GLB,, | Performed by: INTERNAL MEDICINE

## 2019-01-23 PROCEDURE — 99213 OFFICE O/P EST LOW 20 MIN: CPT | Mod: S$GLB,,, | Performed by: INTERNAL MEDICINE

## 2019-01-23 PROCEDURE — 99999 PR PBB SHADOW E&M-EST. PATIENT-LVL III: CPT | Mod: PBBFAC,,, | Performed by: INTERNAL MEDICINE

## 2019-01-23 PROCEDURE — 3074F PR MOST RECENT SYSTOLIC BLOOD PRESSURE < 130 MM HG: ICD-10-PCS | Mod: CPTII,S$GLB,, | Performed by: INTERNAL MEDICINE

## 2019-01-23 PROCEDURE — 3079F DIAST BP 80-89 MM HG: CPT | Mod: CPTII,S$GLB,, | Performed by: INTERNAL MEDICINE

## 2019-01-23 NOTE — PROGRESS NOTES
Subjective:       Patient ID: Gisella Bennett is a 48 y.o. female who presents for Joint Swelling (left ankle)      Ankle Pain    The incident occurred 5 to 7 days ago (left ankle pain began while walking in [place]). The incident occurred in the street. The injury mechanism is unknown. The pain is present in the left ankle. The pain is at a severity of 2/10. The pain is mild. The pain has been improving since onset. Pertinent negatives include no inability to bear weight, loss of sensation, numbness or tingling. She reports no foreign bodies present. The symptoms are aggravated by weight bearing. She has tried rest for the symptoms.      She also noticed swelling on lateral ankle. No redness or warmth. No wounds were noticed.      Review of Systems   Constitutional: Negative for chills, fever and unexpected weight change.   HENT: Negative for congestion, dental problem, rhinorrhea and sinus pressure.    Respiratory: Negative for cough, shortness of breath and wheezing.    Cardiovascular: Negative for chest pain, palpitations and leg swelling.   Gastrointestinal: Negative for abdominal pain, constipation, diarrhea, nausea and vomiting.   Endocrine: Negative for cold intolerance and heat intolerance.   Musculoskeletal: Positive for arthralgias and joint swelling (left ankle). Negative for back pain, gait problem and myalgias.   Skin: Negative for color change and rash.   Neurological: Negative for dizziness, tingling, numbness and headaches.       Objective:      Physical Exam   Constitutional: She is oriented to person, place, and time. Vital signs are normal. She appears well-developed and well-nourished. No distress.   HENT:   Head: Normocephalic and atraumatic.   Right Ear: Hearing and external ear normal.   Left Ear: Hearing and external ear normal.   Nose: Nose normal.   Mouth/Throat: Uvula is midline.   Eyes: Lids are normal.   Neck: Normal range of motion. Neck supple. No thyroid mass present.  Thyromegaly: palpable.   Cardiovascular: Normal rate, regular rhythm, normal heart sounds and intact distal pulses.   No murmur heard.  Pulmonary/Chest: Effort normal and breath sounds normal. She has no wheezes.   Abdominal: Soft. Bowel sounds are normal. She exhibits no distension. There is no tenderness.   Musculoskeletal: Normal range of motion. She exhibits no edema.        Left ankle: She exhibits swelling (slight swelling). She exhibits normal range of motion, no ecchymosis, no laceration and normal pulse. Tenderness. Lateral malleolus and medial malleolus tenderness found. Achilles tendon exhibits no pain.   Neurological: She is alert and oriented to person, place, and time.   Skin: Skin is warm, dry and intact. No rash noted. She is not diaphoretic.   Psychiatric: She has a normal mood and affect.   Vitals reviewed.      Assessment/Plan:         1. Acute left ankle pain  - likely related to mild injury on trip  - rest, ice, compress and elevate  - may use tylenol as needed for pain    2. Palpable thyroid  - US Soft Tissue Head Neck Thyroid; Future  - TSH; Future    3. Severe obesity (BMI 35.0-39.9) with comorbidity  - continue healthy diet and exercise    Call if no improvement in symptoms    Vijaya Hale MD

## 2019-01-26 ENCOUNTER — HOSPITAL ENCOUNTER (OUTPATIENT)
Dept: RADIOLOGY | Facility: HOSPITAL | Age: 49
Discharge: HOME | End: 2019-01-26
Attending: INTERNAL MEDICINE
Payer: COMMERCIAL

## 2019-01-26 DIAGNOSIS — E04.9 PALPABLE THYROID: ICD-10-CM

## 2019-01-26 PROCEDURE — 76536 US EXAM OF HEAD AND NECK: CPT | Mod: TC

## 2019-01-26 PROCEDURE — 76536 US SOFT TISSUE HEAD NECK THYROID: ICD-10-PCS | Mod: 26,,, | Performed by: RADIOLOGY

## 2019-01-26 PROCEDURE — 76536 US EXAM OF HEAD AND NECK: CPT | Mod: 26,,, | Performed by: RADIOLOGY

## 2019-02-06 ENCOUNTER — TELEPHONE (OUTPATIENT)
Dept: OPTOMETRY | Facility: CLINIC | Age: 49
End: 2019-02-06

## 2019-02-07 ENCOUNTER — OFFICE VISIT (OUTPATIENT)
Dept: OPTOMETRY | Facility: CLINIC | Age: 49
End: 2019-02-07
Payer: COMMERCIAL

## 2019-02-07 DIAGNOSIS — H20.9 UVEITIS: Primary | ICD-10-CM

## 2019-02-07 DIAGNOSIS — Z04.9 DISEASE RULED OUT AFTER EXAMINATION: ICD-10-CM

## 2019-02-07 PROCEDURE — 92014 COMPRE OPH EXAM EST PT 1/>: CPT | Mod: S$GLB,,, | Performed by: OPTOMETRIST

## 2019-02-07 PROCEDURE — 92014 PR EYE EXAM, EST PATIENT,COMPREHESV: ICD-10-PCS | Mod: S$GLB,,, | Performed by: OPTOMETRIST

## 2019-02-07 PROCEDURE — 99999 PR PBB SHADOW E&M-EST. PATIENT-LVL II: CPT | Mod: PBBFAC,,, | Performed by: OPTOMETRIST

## 2019-02-07 PROCEDURE — 99999 PR PBB SHADOW E&M-EST. PATIENT-LVL II: ICD-10-PCS | Mod: PBBFAC,,, | Performed by: OPTOMETRIST

## 2019-02-07 NOTE — PROGRESS NOTES
HPI     Glaucoma      Additional comments: 6 wk chk/dfe              Comments     Patient states that vision seems about the same as last visit in both   eyes.    Pupil irregular of right eye  Uveitis  Anterior uveitis  Regular astigmatism of both eyes    No gtts              Last edited by Kirby Daniels on 2/7/2019  3:36 PM. (History)            Assessment /Plan     For exam results, see Encounter Report.    Uveitis    Disease ruled out after examination      Good overall ocular health today  No inflammation remaining  Vision back to normal    RTC 1 year, sooener PRN

## 2019-03-04 ENCOUNTER — LAB VISIT (OUTPATIENT)
Dept: LAB | Facility: HOSPITAL | Age: 49
End: 2019-03-04
Attending: INTERNAL MEDICINE
Payer: COMMERCIAL

## 2019-03-04 ENCOUNTER — OFFICE VISIT (OUTPATIENT)
Dept: INTERNAL MEDICINE | Facility: CLINIC | Age: 49
End: 2019-03-04
Payer: COMMERCIAL

## 2019-03-04 VITALS
DIASTOLIC BLOOD PRESSURE: 88 MMHG | BODY MASS INDEX: 45.31 KG/M2 | HEART RATE: 93 BPM | TEMPERATURE: 99 F | HEIGHT: 60 IN | WEIGHT: 230.81 LBS | SYSTOLIC BLOOD PRESSURE: 128 MMHG

## 2019-03-04 DIAGNOSIS — K21.9 LARYNGOPHARYNGEAL REFLUX (LPR): ICD-10-CM

## 2019-03-04 DIAGNOSIS — I10 ESSENTIAL HYPERTENSION: ICD-10-CM

## 2019-03-04 DIAGNOSIS — I10 ESSENTIAL HYPERTENSION: Primary | ICD-10-CM

## 2019-03-04 DIAGNOSIS — E04.9 PALPABLE THYROID: ICD-10-CM

## 2019-03-04 LAB
ANION GAP SERPL CALC-SCNC: 9 MMOL/L
BUN SERPL-MCNC: 13 MG/DL
CALCIUM SERPL-MCNC: 9.6 MG/DL
CHLORIDE SERPL-SCNC: 103 MMOL/L
CO2 SERPL-SCNC: 26 MMOL/L
CREAT SERPL-MCNC: 1.1 MG/DL
EST. GFR  (AFRICAN AMERICAN): >60 ML/MIN/1.73 M^2
EST. GFR  (NON AFRICAN AMERICAN): 58.7 ML/MIN/1.73 M^2
GLUCOSE SERPL-MCNC: 75 MG/DL
POTASSIUM SERPL-SCNC: 4.1 MMOL/L
SODIUM SERPL-SCNC: 138 MMOL/L
TSH SERPL DL<=0.005 MIU/L-ACNC: 0.78 UIU/ML

## 2019-03-04 PROCEDURE — 80048 BASIC METABOLIC PNL TOTAL CA: CPT

## 2019-03-04 PROCEDURE — 99213 OFFICE O/P EST LOW 20 MIN: CPT | Mod: S$GLB,,, | Performed by: INTERNAL MEDICINE

## 2019-03-04 PROCEDURE — 3079F DIAST BP 80-89 MM HG: CPT | Mod: CPTII,S$GLB,, | Performed by: INTERNAL MEDICINE

## 2019-03-04 PROCEDURE — 3074F SYST BP LT 130 MM HG: CPT | Mod: CPTII,S$GLB,, | Performed by: INTERNAL MEDICINE

## 2019-03-04 PROCEDURE — 36415 COLL VENOUS BLD VENIPUNCTURE: CPT | Mod: PO

## 2019-03-04 PROCEDURE — 84443 ASSAY THYROID STIM HORMONE: CPT

## 2019-03-04 PROCEDURE — 3079F PR MOST RECENT DIASTOLIC BLOOD PRESSURE 80-89 MM HG: ICD-10-PCS | Mod: CPTII,S$GLB,, | Performed by: INTERNAL MEDICINE

## 2019-03-04 PROCEDURE — 99213 PR OFFICE/OUTPT VISIT, EST, LEVL III, 20-29 MIN: ICD-10-PCS | Mod: S$GLB,,, | Performed by: INTERNAL MEDICINE

## 2019-03-04 PROCEDURE — 99999 PR PBB SHADOW E&M-EST. PATIENT-LVL III: ICD-10-PCS | Mod: PBBFAC,,, | Performed by: INTERNAL MEDICINE

## 2019-03-04 PROCEDURE — 3008F PR BODY MASS INDEX (BMI) DOCUMENTED: ICD-10-PCS | Mod: CPTII,S$GLB,, | Performed by: INTERNAL MEDICINE

## 2019-03-04 PROCEDURE — 3074F PR MOST RECENT SYSTOLIC BLOOD PRESSURE < 130 MM HG: ICD-10-PCS | Mod: CPTII,S$GLB,, | Performed by: INTERNAL MEDICINE

## 2019-03-04 PROCEDURE — 3008F BODY MASS INDEX DOCD: CPT | Mod: CPTII,S$GLB,, | Performed by: INTERNAL MEDICINE

## 2019-03-04 PROCEDURE — 99999 PR PBB SHADOW E&M-EST. PATIENT-LVL III: CPT | Mod: PBBFAC,,, | Performed by: INTERNAL MEDICINE

## 2019-03-05 NOTE — PROGRESS NOTES
Subjective:       Patient ID: Gisella Bennett is a 48 y.o. female who presents for Follow-up (2m f/u) and Hypertension      Hypertension   This is a chronic problem. The current episode started more than 1 month ago. The problem is unchanged. The problem is controlled. Pertinent negatives include no chest pain, headaches, palpitations, peripheral edema or shortness of breath. There are no associated agents to hypertension. Risk factors for coronary artery disease include family history. Past treatments include diuretics. The current treatment provides moderate improvement. There are no compliance problems.  There is no history of kidney disease. There is no history of chronic renal disease or a hypertension causing med.        Review of Systems   Constitutional: Negative for chills and fever.   HENT: Negative for congestion, rhinorrhea and sinus pressure.    Respiratory: Negative for cough and shortness of breath.    Cardiovascular: Negative for chest pain, palpitations and leg swelling.   Gastrointestinal: Negative for abdominal pain, diarrhea, nausea and vomiting.   Genitourinary: Negative for dysuria and hematuria.   Musculoskeletal: Negative for arthralgias and myalgias.   Skin: Negative for rash.   Neurological: Negative for dizziness, numbness and headaches.       Objective:      Physical Exam   Constitutional: She is oriented to person, place, and time. Vital signs are normal. She appears well-developed and well-nourished. No distress.   HENT:   Head: Normocephalic and atraumatic.   Right Ear: Hearing and external ear normal.   Left Ear: Hearing and external ear normal.   Nose: Nose normal.   Mouth/Throat: Uvula is midline and mucous membranes are normal.   Eyes: Lids are normal.   Cardiovascular: Normal rate, regular rhythm, normal heart sounds and intact distal pulses.   No murmur heard.  Pulmonary/Chest: Effort normal and breath sounds normal. She has no wheezes.   Abdominal: Soft. Bowel sounds are  normal. She exhibits no distension. There is no tenderness.   Musculoskeletal: Normal range of motion. She exhibits no edema.   Neurological: She is alert and oriented to person, place, and time.   Skin: Skin is warm, dry and intact. No rash noted. She is not diaphoretic.   Psychiatric: She has a normal mood and affect.   Vitals reviewed.      Assessment/Plan:       1. Essential hypertension  - BP is controlled, continue HCTZ  - Basic metabolic panel; Future    2. Laryngopharyngeal reflux  - patient to discuss need for further treatment with ENT physician    RTC in 6 months or sooner if needed    Vijaya Hale MD

## 2019-03-06 ENCOUNTER — TELEPHONE (OUTPATIENT)
Dept: INTERNAL MEDICINE | Facility: CLINIC | Age: 49
End: 2019-03-06

## 2019-04-10 DIAGNOSIS — I10 ESSENTIAL HYPERTENSION: ICD-10-CM

## 2019-04-10 RX ORDER — ERGOCALCIFEROL 1.25 MG/1
CAPSULE ORAL
Qty: 12 CAPSULE | Refills: 0 | Status: SHIPPED | OUTPATIENT
Start: 2019-04-10 | End: 2019-07-10 | Stop reason: SDUPTHER

## 2019-04-10 RX ORDER — HYDROCHLOROTHIAZIDE 12.5 MG/1
TABLET ORAL
Qty: 30 TABLET | Refills: 0 | OUTPATIENT
Start: 2019-04-10

## 2019-04-10 RX ORDER — HYDROCHLOROTHIAZIDE 12.5 MG/1
12.5 TABLET ORAL DAILY
Qty: 30 TABLET | Refills: 2 | Status: SHIPPED | OUTPATIENT
Start: 2019-04-10 | End: 2019-10-09 | Stop reason: ALTCHOICE

## 2019-06-12 DIAGNOSIS — R05.3 CHRONIC COUGH: ICD-10-CM

## 2019-06-12 RX ORDER — OMEPRAZOLE 40 MG/1
CAPSULE, DELAYED RELEASE ORAL
Qty: 30 CAPSULE | Refills: 0 | OUTPATIENT
Start: 2019-06-12

## 2019-07-10 RX ORDER — ERGOCALCIFEROL 1.25 MG/1
CAPSULE ORAL
Qty: 12 CAPSULE | Refills: 0 | Status: SHIPPED | OUTPATIENT
Start: 2019-07-10 | End: 2020-11-09

## 2019-07-13 NOTE — PATIENT INSTRUCTIONS
ORAL PREDNISONE 2 PILLS A DAY FOR 1 WEEK, THEN DECREASE TO 1 PILL A DAY UNTIL YOU SEE ME AGAIN    DUREZOL - 3 TIMES A DAY    TIMOLOL - TWICE A DAY  
no

## 2019-09-16 ENCOUNTER — PATIENT OUTREACH (OUTPATIENT)
Dept: ADMINISTRATIVE | Facility: HOSPITAL | Age: 49
End: 2019-09-16

## 2019-10-01 ENCOUNTER — TELEPHONE (OUTPATIENT)
Dept: OBSTETRICS AND GYNECOLOGY | Facility: CLINIC | Age: 49
End: 2019-10-01

## 2019-10-01 NOTE — TELEPHONE ENCOUNTER
Returned call to the patient. Patient c/o excessive bleeding with Mirena IUD. Patient stated that her IUD was due to come out 1 year ago. Patient c/o light bleeding that changes to bright red, pink, and sometimes dark. Patient agreed to a sooner appointment with Dr. KAI Mariano on 10/09 at 3PM. Patient informed of the appointment location, date, and time. Patient verbalized understanding.

## 2019-10-01 NOTE — TELEPHONE ENCOUNTER
----- Message from Carlton Benavides sent at 9/30/2019  4:08 PM CDT -----  Contact: [phone]/self   Patient would like to be seen as a new patient for a ANNUAL visit  Please call back to assist at [phone]

## 2019-10-02 ENCOUNTER — PATIENT OUTREACH (OUTPATIENT)
Dept: ADMINISTRATIVE | Facility: OTHER | Age: 49
End: 2019-10-02

## 2019-10-02 ENCOUNTER — OFFICE VISIT (OUTPATIENT)
Dept: OPTOMETRY | Facility: CLINIC | Age: 49
End: 2019-10-02
Payer: COMMERCIAL

## 2019-10-02 DIAGNOSIS — H20.00 ACUTE IRITIS: Primary | ICD-10-CM

## 2019-10-02 DIAGNOSIS — H21.561 PUPIL IRREGULAR OF RIGHT EYE: ICD-10-CM

## 2019-10-02 PROCEDURE — 99999 PR PBB SHADOW E&M-EST. PATIENT-LVL II: ICD-10-PCS | Mod: PBBFAC,,, | Performed by: OPTOMETRIST

## 2019-10-02 PROCEDURE — 99999 PR PBB SHADOW E&M-EST. PATIENT-LVL II: CPT | Mod: PBBFAC,,, | Performed by: OPTOMETRIST

## 2019-10-02 PROCEDURE — 92012 PR EYE EXAM, EST PATIENT,INTERMED: ICD-10-PCS | Mod: S$GLB,,, | Performed by: OPTOMETRIST

## 2019-10-02 PROCEDURE — 92012 INTRM OPH EXAM EST PATIENT: CPT | Mod: S$GLB,,, | Performed by: OPTOMETRIST

## 2019-10-02 RX ORDER — DIFLUPREDNATE OPHTHALMIC 0.5 MG/ML
1 EMULSION OPHTHALMIC 4 TIMES DAILY
Qty: 2 ML | Refills: 0 | Status: SHIPPED | OUTPATIENT
Start: 2019-10-02 | End: 2019-10-09

## 2019-10-03 DIAGNOSIS — Z12.11 ENCOUNTER FOR FIT (FECAL IMMUNOCHEMICAL TEST) SCREENING: Primary | ICD-10-CM

## 2019-10-03 DIAGNOSIS — Z12.31 ENCOUNTER FOR SCREENING MAMMOGRAM FOR MALIGNANT NEOPLASM OF BREAST: ICD-10-CM

## 2019-10-04 ENCOUNTER — PATIENT MESSAGE (OUTPATIENT)
Dept: OPTOMETRY | Facility: CLINIC | Age: 49
End: 2019-10-04

## 2019-10-04 NOTE — TELEPHONE ENCOUNTER
Spoke with pt on the phone today  Increase Durzol to Q3 hours OD  Start timolol BID OD  Pt will see Dr. Barron Monday AM for follow up  Instructed to go to ER if vision worsens

## 2019-10-04 NOTE — PROGRESS NOTES
HPI     Pt states yesterday noticed some blurred va OD yesterday and kept   blinking to try and clear it up. Pt states today still having blurred va   and now an achy pain and redness OD. No sensitivity to light. No itching   burning or tearing.     Last edited by Mel Kothari on 10/2/2019  3:20 PM. (History)            Assessment /Plan     For exam results, see Encounter Report.    Acute iritis  Pupil irregular of right eye    -     difluprednate (DUREZOL) 0.05 % Drop ophthalmic solution; Place 1 drop into the right eye 4 (four) times daily. for 7 days  Dispense: 2 mL; Refill: 0    Return Monday start taper if improving, IOP check, start timolol if IOP increased,

## 2019-10-07 ENCOUNTER — OFFICE VISIT (OUTPATIENT)
Dept: OPHTHALMOLOGY | Facility: CLINIC | Age: 49
End: 2019-10-07
Payer: COMMERCIAL

## 2019-10-07 DIAGNOSIS — H20.9 ANTERIOR UVEITIS: Primary | ICD-10-CM

## 2019-10-07 PROCEDURE — 99999 PR PBB SHADOW E&M-EST. PATIENT-LVL III: CPT | Mod: PBBFAC,,, | Performed by: OPHTHALMOLOGY

## 2019-10-07 PROCEDURE — 99999 PR PBB SHADOW E&M-EST. PATIENT-LVL III: ICD-10-PCS | Mod: PBBFAC,,, | Performed by: OPHTHALMOLOGY

## 2019-10-07 PROCEDURE — 92014 COMPRE OPH EXAM EST PT 1/>: CPT | Mod: S$GLB,,, | Performed by: OPHTHALMOLOGY

## 2019-10-07 PROCEDURE — 92014 PR EYE EXAM, EST PATIENT,COMPREHESV: ICD-10-PCS | Mod: S$GLB,,, | Performed by: OPHTHALMOLOGY

## 2019-10-07 NOTE — PROGRESS NOTES
HPI     Referred by Dr Rodríguez / Jorge     Iritis OD - 3rd episode (+AMALIA, w/up otherwise neg)    DUREZOL - QID OD  TIMOLOL - BID OD       Pt referred by Dr Patel for iritis check.  Pt states she has blurry VA OD   that has improved some. Pt states she has been having dizziness and   pressure on the side of her head and right eye.  Pt did take a flight this   weekend but the dizziness also happened this morning.  Pt denies eye pain   or photophobia today.     Last edited by Greta Barron MD on 10/7/2019  9:12 AM. (History)            Assessment /Plan     For exam results, see Encounter Report.    Anterior uveitis        Iritis OD - 3rd episode  + AMALIA, w/up otherwise unremarkable (1st w/up negative when she saw rheum ~20 yrs ago)  - only rare cell appreciated today on durezol QID  - IOP wnl  - CPM for now, ensure inflammation quiet next wk then okay to start slow taper 3/2/1 off

## 2019-10-09 ENCOUNTER — PATIENT OUTREACH (OUTPATIENT)
Dept: ADMINISTRATIVE | Facility: OTHER | Age: 49
End: 2019-10-09

## 2019-10-09 ENCOUNTER — HOSPITAL ENCOUNTER (OUTPATIENT)
Dept: RADIOLOGY | Facility: OTHER | Age: 49
Discharge: HOME | End: 2019-10-09
Attending: INTERNAL MEDICINE
Payer: COMMERCIAL

## 2019-10-09 ENCOUNTER — OFFICE VISIT (OUTPATIENT)
Dept: OBSTETRICS AND GYNECOLOGY | Facility: CLINIC | Age: 49
End: 2019-10-09
Payer: COMMERCIAL

## 2019-10-09 VITALS
HEIGHT: 63 IN | DIASTOLIC BLOOD PRESSURE: 76 MMHG | SYSTOLIC BLOOD PRESSURE: 110 MMHG | WEIGHT: 204.13 LBS | BODY MASS INDEX: 36.17 KG/M2

## 2019-10-09 DIAGNOSIS — N93.9 ABNORMAL UTERINE BLEEDING (AUB): Primary | ICD-10-CM

## 2019-10-09 DIAGNOSIS — Z30.432 AWAITING REMOVAL OF CONTRACEPTIVE INTRAUTERINE DEVICE (IUD): ICD-10-CM

## 2019-10-09 DIAGNOSIS — Z12.31 ENCOUNTER FOR SCREENING MAMMOGRAM FOR MALIGNANT NEOPLASM OF BREAST: ICD-10-CM

## 2019-10-09 LAB
B-HCG UR QL: NEGATIVE
CTP QC/QA: YES

## 2019-10-09 PROCEDURE — 81025 URINE PREGNANCY TEST: CPT | Mod: S$GLB,,, | Performed by: STUDENT IN AN ORGANIZED HEALTH CARE EDUCATION/TRAINING PROGRAM

## 2019-10-09 PROCEDURE — 58301 PR REMOVE, INTRAUTERINE DEVICE: ICD-10-PCS | Mod: S$GLB,,, | Performed by: STUDENT IN AN ORGANIZED HEALTH CARE EDUCATION/TRAINING PROGRAM

## 2019-10-09 PROCEDURE — 3078F DIAST BP <80 MM HG: CPT | Mod: CPTII,S$GLB,, | Performed by: STUDENT IN AN ORGANIZED HEALTH CARE EDUCATION/TRAINING PROGRAM

## 2019-10-09 PROCEDURE — 81025 POCT URINE PREGNANCY: ICD-10-PCS | Mod: S$GLB,,, | Performed by: STUDENT IN AN ORGANIZED HEALTH CARE EDUCATION/TRAINING PROGRAM

## 2019-10-09 PROCEDURE — 77063 MAMMO DIGITAL SCREENING BILAT WITH TOMOSYNTHESIS_CAD: ICD-10-PCS | Mod: 26,,, | Performed by: RADIOLOGY

## 2019-10-09 PROCEDURE — 3008F BODY MASS INDEX DOCD: CPT | Mod: CPTII,S$GLB,, | Performed by: STUDENT IN AN ORGANIZED HEALTH CARE EDUCATION/TRAINING PROGRAM

## 2019-10-09 PROCEDURE — 77063 BREAST TOMOSYNTHESIS BI: CPT | Mod: 26,,, | Performed by: RADIOLOGY

## 2019-10-09 PROCEDURE — 99213 OFFICE O/P EST LOW 20 MIN: CPT | Mod: 25,S$GLB,, | Performed by: STUDENT IN AN ORGANIZED HEALTH CARE EDUCATION/TRAINING PROGRAM

## 2019-10-09 PROCEDURE — 3074F PR MOST RECENT SYSTOLIC BLOOD PRESSURE < 130 MM HG: ICD-10-PCS | Mod: CPTII,S$GLB,, | Performed by: STUDENT IN AN ORGANIZED HEALTH CARE EDUCATION/TRAINING PROGRAM

## 2019-10-09 PROCEDURE — 3008F PR BODY MASS INDEX (BMI) DOCUMENTED: ICD-10-PCS | Mod: CPTII,S$GLB,, | Performed by: STUDENT IN AN ORGANIZED HEALTH CARE EDUCATION/TRAINING PROGRAM

## 2019-10-09 PROCEDURE — 77067 MAMMO DIGITAL SCREENING BILAT WITH TOMOSYNTHESIS_CAD: ICD-10-PCS | Mod: 26,,, | Performed by: RADIOLOGY

## 2019-10-09 PROCEDURE — 3074F SYST BP LT 130 MM HG: CPT | Mod: CPTII,S$GLB,, | Performed by: STUDENT IN AN ORGANIZED HEALTH CARE EDUCATION/TRAINING PROGRAM

## 2019-10-09 PROCEDURE — 99999 PR PBB SHADOW E&M-EST. PATIENT-LVL III: ICD-10-PCS | Mod: PBBFAC,,, | Performed by: STUDENT IN AN ORGANIZED HEALTH CARE EDUCATION/TRAINING PROGRAM

## 2019-10-09 PROCEDURE — 99999 PR PBB SHADOW E&M-EST. PATIENT-LVL III: CPT | Mod: PBBFAC,,, | Performed by: STUDENT IN AN ORGANIZED HEALTH CARE EDUCATION/TRAINING PROGRAM

## 2019-10-09 PROCEDURE — 77067 SCR MAMMO BI INCL CAD: CPT | Mod: TC

## 2019-10-09 PROCEDURE — 99213 PR OFFICE/OUTPT VISIT, EST, LEVL III, 20-29 MIN: ICD-10-PCS | Mod: 25,S$GLB,, | Performed by: STUDENT IN AN ORGANIZED HEALTH CARE EDUCATION/TRAINING PROGRAM

## 2019-10-09 PROCEDURE — 3078F PR MOST RECENT DIASTOLIC BLOOD PRESSURE < 80 MM HG: ICD-10-PCS | Mod: CPTII,S$GLB,, | Performed by: STUDENT IN AN ORGANIZED HEALTH CARE EDUCATION/TRAINING PROGRAM

## 2019-10-09 PROCEDURE — 77067 SCR MAMMO BI INCL CAD: CPT | Mod: 26,,, | Performed by: RADIOLOGY

## 2019-10-09 PROCEDURE — 58301 REMOVE INTRAUTERINE DEVICE: CPT | Mod: S$GLB,,, | Performed by: STUDENT IN AN ORGANIZED HEALTH CARE EDUCATION/TRAINING PROGRAM

## 2019-10-09 RX ORDER — ASPIRIN 325 MG
TABLET, DELAYED RELEASE (ENTERIC COATED) ORAL
Refills: 2 | COMMUNITY
Start: 2019-09-29 | End: 2021-03-11

## 2019-10-09 RX ORDER — HYDROCHLOROTHIAZIDE 12.5 MG/1
12.5 CAPSULE ORAL DAILY
COMMUNITY
Start: 2019-10-05 | End: 2020-11-09 | Stop reason: SDUPTHER

## 2019-10-09 RX ORDER — MONTELUKAST SODIUM 10 MG/1
TABLET ORAL
Refills: 5 | COMMUNITY
Start: 2019-09-10 | End: 2020-12-10 | Stop reason: SDUPTHER

## 2019-10-09 NOTE — PROGRESS NOTES
History & Physical  Gynecology      SUBJECTIVE:     Chief Complaint: excessive bleeding       History of Present Illness:  Pt here for heavy bleeding. Mirena  2018. In the last month, she noted bleeding for two weeks, varied in heaviness (started ), recently stopped.  Sexually active with , no bleeding or pain.  Normal issues with GI/.  No discharge  No si/sx of menstrual    Lab Results   Component Value Date    TSH 0.783 2019       Review of patient's allergies indicates:   Allergen Reactions    Codeine Other (See Comments)     Tremors    Nuts [tree nut] Anaphylaxis    Avocado (laurus persea) Itching, Nausea And Vomiting and Other (See Comments)       Past Medical History:   Diagnosis Date    Atopic dermatitis     Environmental allergies 3/29/2016    Lower back pain 3/29/2016    Trigeminal neuralgia 2015    Uveitis of right eye ,     Vitamin D deficiency disease 2015     Past Surgical History:   Procedure Laterality Date    INTRAUTERINE DEVICE INSERTION      MIRENA    SPINAL FUSION  2013    Spinal fusion, laminectomies     OB History        4    Para   3    Term   3            AB   1    Living   3       SAB        TAB   1    Ectopic        Multiple        Live Births                   Family History   Problem Relation Age of Onset    Heart disease Father 56        congenital defect, aortic replacement    Kidney disease Father         after Cardiac study     Diabetes Mellitus Father     Thyroid disease Mother         Goiter removed    No Known Problems Paternal Grandfather     No Known Problems Paternal Grandmother     No Known Problems Maternal Grandmother     No Known Problems Maternal Grandfather     Eczema Brother     Breast cancer Maternal Aunt     Sarcoidosis Cousin 55        lung involvement    Asthma Neg Hx     Emphysema Neg Hx     Blindness Neg Hx     Glaucoma Neg Hx     Macular degeneration Neg Hx     Retinal  detachment Neg Hx     Inflammatory bowel disease Neg Hx     Lupus Neg Hx     Psoriasis Neg Hx     Rheum arthritis Neg Hx     Colon cancer Neg Hx     Ovarian cancer Neg Hx      Social History     Tobacco Use    Smoking status: Never Smoker    Smokeless tobacco: Never Used    Tobacco comment: [employer]anth[employer];     Substance Use Topics    Alcohol use: Not Currently     Comment: one glass twice a week at the most    Drug use: No       Current Outpatient Medications   Medication Sig    cholecalciferol, vitamin D3, 50,000 unit capsule TK 1 C PO 2 TIMES Q WEEK    difluprednate (DUREZOL) 0.05 % Drop ophthalmic solution Place 1 drop into the right eye 4 (four) times daily. for 7 days    EPIPEN 2-CHIDI 0.3 mg/0.3 mL (1:1,000) AtIn     ergocalciferol (ERGOCALCIFEROL) 50,000 unit Cap TAKE 1 CAPSULE BY MOUTH EVERY 7 DAYS    fluticasone (FLONASE) 50 mcg/actuation nasal spray 2 sprays by Each Nare route as needed.     hydroCHLOROthiazide (MICROZIDE) 12.5 mg capsule     montelukast (SINGULAIR) 10 mg tablet TK 1 T PO  QHS    timolol (BETIMOL) 0.5 % ophthalmic solution Place 1 drop into the right eye 2 (two) times daily.    levocetirizine (XYZAL) 5 MG tablet Take 1 tablet (5 mg total) by mouth every evening.    levonorgestrel (MIRENA) 20 mcg/24 hr (5 years) IUD 1 each by Intrauterine route once.    omeprazole (PRILOSEC) 40 MG capsule Take 1 capsule (40 mg total) by mouth before breakfast. Take on empty stomach 30-60 minutes before meal (Patient taking differently: Take 10 mg by mouth before breakfast. Take on empty stomach 30-60 minutes before meal)     No current facility-administered medications for this visit.          Review of Systems:  Review of Systems   Constitutional: Negative for activity change, appetite change, fever and unexpected weight change.   Eyes: Negative for visual disturbance.   Respiratory: Negative for shortness of breath.    Cardiovascular: Negative for chest pain.   Gastrointestinal:  Negative for abdominal pain, blood in stool, constipation, diarrhea, nausea and vomiting.   Genitourinary: Positive for menstrual problem. Negative for dysmenorrhea, dyspareunia, dysuria, hematuria, menorrhagia, pelvic pain, vaginal bleeding, vaginal discharge, vaginal pain, postcoital bleeding and vaginal odor.   Integumentary:  Negative for breast mass and nipple discharge.   Neurological: Negative for headaches.   Breast: Negative for lump, mass and nipple discharge       OBJECTIVE:     Physical Exam:  Physical Exam   Constitutional: She is oriented to person, place, and time. She appears well-developed and well-nourished. No distress.   HENT:   Head: Normocephalic and atraumatic.   Eyes: EOM are normal.   Neck: Normal range of motion.   Cardiovascular: Normal rate.   Pulmonary/Chest: Effort normal.   Abdominal: Soft. She exhibits no distension and no mass. There is no tenderness.   Genitourinary: There is no rash, tenderness, lesion or injury on the right labia. There is no rash, tenderness, lesion or injury on the left labia. Uterus is not deviated, not enlarged, not fixed and not tender. Cervix exhibits no motion tenderness, no discharge and no friability. Right adnexum displays no mass, no tenderness and no fullness. Left adnexum displays no mass, no tenderness and no fullness. No erythema, tenderness or bleeding in the vagina. No foreign body in the vagina. No signs of injury around the vagina. No vaginal discharge found.   Genitourinary Comments: IUD STRINGS VISUALIZED. REMOVED INTACT WITHOUT COMPLICATION   Musculoskeletal: Normal range of motion.   Neurological: She is alert and oriented to person, place, and time.   Skin: Skin is warm and dry. She is not diaphoretic.   Psychiatric: She has a normal mood and affect.   Vitals reviewed.        ASSESSMENT:       ICD-10-CM ICD-9-CM    1. Abnormal uterine bleeding (AUB) N93.9 626.9 POCT Urine Pregnancy      Follicle stimulating hormone   2. Awaiting removal of  contraceptive intrauterine device (IUD) Z30.432 V25.12           Plan:      Patient was counseled today on the causes of AUB/menorrhagia: fibroids, polyps, adenomyosis, anovulation resulting in endometrial hyperplasia, endometritis, cervicitis; the need for a proper evaluation and for a tissue diagnosis was discussed. A Hysteroscopy D/C may be necessary. The need for a bHCG, CX C/S and TVS was discussed. The hormonal treatment options for menorrhagia include: Prometrium, BCPs + NSAID, IUD-Merena, the pros, cons, risks, and benefits of each was discussed; other options include Uterine ablation and Hysterectomy, each was discussed in detail.   Pap utd, STD screening n/a, UPT negative.   IUD in place. Removed intact today.  Will check FSH level in a few weeks to see if in menopausal range. Pt would like to see what her body does without any hormone affect. Counseled that if she is having periods and not using contraception, she could become pregnant. Pt interested in patch if she is not yet menopausal.  Counseling time: 30 minutes    Selin Mariano

## 2019-10-14 ENCOUNTER — OFFICE VISIT (OUTPATIENT)
Dept: OPTOMETRY | Facility: CLINIC | Age: 49
End: 2019-10-14
Payer: COMMERCIAL

## 2019-10-14 DIAGNOSIS — H20.9 UVEITIS: Primary | ICD-10-CM

## 2019-10-14 PROCEDURE — 99999 PR PBB SHADOW E&M-EST. PATIENT-LVL II: CPT | Mod: PBBFAC,,, | Performed by: OPTOMETRIST

## 2019-10-14 PROCEDURE — 92012 PR EYE EXAM, EST PATIENT,INTERMED: ICD-10-PCS | Mod: S$GLB,,, | Performed by: OPTOMETRIST

## 2019-10-14 PROCEDURE — 99999 PR PBB SHADOW E&M-EST. PATIENT-LVL II: ICD-10-PCS | Mod: PBBFAC,,, | Performed by: OPTOMETRIST

## 2019-10-14 PROCEDURE — 92012 INTRM OPH EXAM EST PATIENT: CPT | Mod: S$GLB,,, | Performed by: OPTOMETRIST

## 2019-10-18 NOTE — PROGRESS NOTES
HPI     Pt here for f/u from dr montero uveitis OD   Pt states she is still having some blurriness but no pain  Pt using durezol QID and timolol BID    Last edited by Mel Kothari on 10/14/2019  9:24 AM. (History)            Assessment /Plan     For exam results, see Encounter Report.    Uveitis    Taper durezol, TID x 1 week, then BID x 1 week, then QD x 1 week then stop  Continue with timolol until finished durezol         RTC 2 weeks for IOP check

## 2019-10-24 ENCOUNTER — PATIENT OUTREACH (OUTPATIENT)
Dept: ADMINISTRATIVE | Facility: OTHER | Age: 49
End: 2019-10-24

## 2019-10-24 DIAGNOSIS — Z12.11 ENCOUNTER FOR FIT (FECAL IMMUNOCHEMICAL TEST) SCREENING: Primary | ICD-10-CM

## 2019-10-28 ENCOUNTER — OFFICE VISIT (OUTPATIENT)
Dept: OPTOMETRY | Facility: CLINIC | Age: 49
End: 2019-10-28
Payer: COMMERCIAL

## 2019-10-28 DIAGNOSIS — H04.123 DRY EYE SYNDROME, BILATERAL: ICD-10-CM

## 2019-10-28 DIAGNOSIS — H20.9 ANTERIOR UVEITIS: Primary | ICD-10-CM

## 2019-10-28 PROCEDURE — 92012 INTRM OPH EXAM EST PATIENT: CPT | Mod: S$GLB,,, | Performed by: OPTOMETRIST

## 2019-10-28 PROCEDURE — 92012 PR EYE EXAM, EST PATIENT,INTERMED: ICD-10-PCS | Mod: S$GLB,,, | Performed by: OPTOMETRIST

## 2019-10-28 PROCEDURE — 99999 PR PBB SHADOW E&M-EST. PATIENT-LVL II: CPT | Mod: PBBFAC,,, | Performed by: OPTOMETRIST

## 2019-10-28 PROCEDURE — 99999 PR PBB SHADOW E&M-EST. PATIENT-LVL II: ICD-10-PCS | Mod: PBBFAC,,, | Performed by: OPTOMETRIST

## 2019-10-31 ENCOUNTER — PATIENT MESSAGE (OUTPATIENT)
Dept: OBSTETRICS AND GYNECOLOGY | Facility: CLINIC | Age: 49
End: 2019-10-31

## 2019-10-31 NOTE — PROGRESS NOTES
HPI     Pt here for f/u and iop chk  Pt states OU getting a lot better  Using durezol qd starting today and timolol bid      Last edited by Mel Kothari on 10/28/2019  8:47 AM. (History)            Assessment /Plan     For exam results, see Encounter Report.    Anterior uveitis      Resolved, continue on durezol and timolol qd x 1 week    Keratitis/ Dry eye  Continue with artificial tears BID/PRN       Refresh PM ointment QHS    Return for annual/ DFE 1-2 months

## 2019-11-19 ENCOUNTER — TELEPHONE (OUTPATIENT)
Dept: ADMINISTRATIVE | Facility: OTHER | Age: 49
End: 2019-11-19

## 2019-11-26 ENCOUNTER — TELEPHONE (OUTPATIENT)
Dept: ADMINISTRATIVE | Facility: OTHER | Age: 49
End: 2019-11-26

## 2019-11-26 ENCOUNTER — PATIENT MESSAGE (OUTPATIENT)
Dept: ADMINISTRATIVE | Facility: OTHER | Age: 49
End: 2019-11-26

## 2019-12-12 ENCOUNTER — PATIENT OUTREACH (OUTPATIENT)
Dept: ADMINISTRATIVE | Facility: OTHER | Age: 49
End: 2019-12-12

## 2019-12-23 ENCOUNTER — PATIENT MESSAGE (OUTPATIENT)
Dept: ADMINISTRATIVE | Facility: OTHER | Age: 49
End: 2019-12-23

## 2019-12-30 ENCOUNTER — LAB VISIT (OUTPATIENT)
Dept: LAB | Facility: HOSPITAL | Age: 49
End: 2019-12-30
Attending: STUDENT IN AN ORGANIZED HEALTH CARE EDUCATION/TRAINING PROGRAM
Payer: COMMERCIAL

## 2019-12-30 DIAGNOSIS — N93.9 ABNORMAL UTERINE BLEEDING (AUB): ICD-10-CM

## 2019-12-30 LAB — FSH SERPL-ACNC: 12 MIU/ML

## 2019-12-30 PROCEDURE — 83001 ASSAY OF GONADOTROPIN (FSH): CPT

## 2019-12-30 PROCEDURE — 36415 COLL VENOUS BLD VENIPUNCTURE: CPT | Mod: PO

## 2019-12-31 ENCOUNTER — LAB VISIT (OUTPATIENT)
Dept: LAB | Facility: HOSPITAL | Age: 49
End: 2019-12-31
Attending: INTERNAL MEDICINE
Payer: COMMERCIAL

## 2019-12-31 ENCOUNTER — PATIENT MESSAGE (OUTPATIENT)
Dept: OBSTETRICS AND GYNECOLOGY | Facility: CLINIC | Age: 49
End: 2019-12-31

## 2019-12-31 DIAGNOSIS — Z12.11 ENCOUNTER FOR FIT (FECAL IMMUNOCHEMICAL TEST) SCREENING: ICD-10-CM

## 2019-12-31 PROCEDURE — 82274 ASSAY TEST FOR BLOOD FECAL: CPT

## 2020-01-02 LAB — HEMOCCULT STL QL IA: NEGATIVE

## 2020-01-02 RX ORDER — NORELGESTROMIN AND ETHINYL ESTRADIOL 35; 150 UG/MG; UG/MG
1 PATCH TRANSDERMAL
Qty: 4 PATCH | Refills: 11 | Status: SHIPPED | OUTPATIENT
Start: 2020-01-02 | End: 2020-11-09 | Stop reason: SDUPTHER

## 2020-03-02 ENCOUNTER — OFFICE VISIT (OUTPATIENT)
Dept: PRIMARY CARE CLINIC | Facility: CLINIC | Age: 50
End: 2020-03-02
Payer: COMMERCIAL

## 2020-03-02 VITALS
SYSTOLIC BLOOD PRESSURE: 120 MMHG | BODY MASS INDEX: 34.65 KG/M2 | OXYGEN SATURATION: 99 % | HEIGHT: 63 IN | TEMPERATURE: 99 F | DIASTOLIC BLOOD PRESSURE: 90 MMHG | WEIGHT: 195.56 LBS | HEART RATE: 75 BPM

## 2020-03-02 DIAGNOSIS — J20.9 ACUTE BRONCHITIS, UNSPECIFIED ORGANISM: ICD-10-CM

## 2020-03-02 DIAGNOSIS — J06.9 ACUTE UPPER RESPIRATORY INFECTION: Primary | ICD-10-CM

## 2020-03-02 LAB
CTP QC/QA: YES
POC MOLECULAR INFLUENZA A AGN: NEGATIVE
POC MOLECULAR INFLUENZA B AGN: NEGATIVE

## 2020-03-02 PROCEDURE — 3080F PR MOST RECENT DIASTOLIC BLOOD PRESSURE >= 90 MM HG: ICD-10-PCS | Mod: CPTII,S$GLB,, | Performed by: INTERNAL MEDICINE

## 2020-03-02 PROCEDURE — 3008F PR BODY MASS INDEX (BMI) DOCUMENTED: ICD-10-PCS | Mod: CPTII,S$GLB,, | Performed by: INTERNAL MEDICINE

## 2020-03-02 PROCEDURE — 99213 PR OFFICE/OUTPT VISIT, EST, LEVL III, 20-29 MIN: ICD-10-PCS | Mod: S$GLB,,, | Performed by: INTERNAL MEDICINE

## 2020-03-02 PROCEDURE — 3074F PR MOST RECENT SYSTOLIC BLOOD PRESSURE < 130 MM HG: ICD-10-PCS | Mod: CPTII,S$GLB,, | Performed by: INTERNAL MEDICINE

## 2020-03-02 PROCEDURE — 99999 PR PBB SHADOW E&M-EST. PATIENT-LVL III: CPT | Mod: PBBFAC,,, | Performed by: INTERNAL MEDICINE

## 2020-03-02 PROCEDURE — 99999 PR PBB SHADOW E&M-EST. PATIENT-LVL III: ICD-10-PCS | Mod: PBBFAC,,, | Performed by: INTERNAL MEDICINE

## 2020-03-02 PROCEDURE — 3080F DIAST BP >= 90 MM HG: CPT | Mod: CPTII,S$GLB,, | Performed by: INTERNAL MEDICINE

## 2020-03-02 PROCEDURE — 99213 OFFICE O/P EST LOW 20 MIN: CPT | Mod: S$GLB,,, | Performed by: INTERNAL MEDICINE

## 2020-03-02 PROCEDURE — 87502 INFLUENZA DNA AMP PROBE: CPT | Mod: QW,S$GLB,, | Performed by: INTERNAL MEDICINE

## 2020-03-02 PROCEDURE — 87502 POCT INFLUENZA A/B MOLECULAR: ICD-10-PCS | Mod: QW,S$GLB,, | Performed by: INTERNAL MEDICINE

## 2020-03-02 PROCEDURE — 3074F SYST BP LT 130 MM HG: CPT | Mod: CPTII,S$GLB,, | Performed by: INTERNAL MEDICINE

## 2020-03-02 PROCEDURE — 3008F BODY MASS INDEX DOCD: CPT | Mod: CPTII,S$GLB,, | Performed by: INTERNAL MEDICINE

## 2020-03-02 RX ORDER — AZITHROMYCIN 250 MG/1
TABLET, FILM COATED ORAL
Qty: 6 TABLET | Refills: 0 | Status: SHIPPED | OUTPATIENT
Start: 2020-03-02 | End: 2020-03-24 | Stop reason: ALTCHOICE

## 2020-03-02 RX ORDER — GUAIFENESIN AND DEXTROMETHORPHAN HYDROBROMIDE 1200; 60 MG/1; MG/1
1 TABLET, EXTENDED RELEASE ORAL 2 TIMES DAILY PRN
Refills: 0 | COMMUNITY
Start: 2020-03-02 | End: 2020-03-12

## 2020-03-02 RX ORDER — FEXOFENADINE HCL 60 MG
60 TABLET ORAL DAILY
COMMUNITY
End: 2020-12-10

## 2020-03-02 NOTE — PROGRESS NOTES
Subjective:       Patient ID: Gisella Bennett is a 49 y.o. female.    Chief Complaint: Cough; Sinus Problem (congestion); and Fever    Onset of illness three days ago with sneezing, itchy runny nose, nasal congestion. Two days ago, cough began, with low grade fever 99.3. Cough has worsened, productive of brownish yellow sputum, with chest heaviness. No headache or body aches, no sinus pain/pressure or purulent nasal discharge, no earache or severe sore throat. No pleuritic chest pain, wheezing, SOB - has not needed her inhaler. No nausea, vomiting or diarrhea. Has seasonal allergies that typically flare in the spring, currently taking Singulair and Allegra, not Flonase. Has only added Tylenol for symptoms. No recent travel.     Past history reviewed, includes Hypertension.   Non-smoker.   Allergies include Codeine.     Review of Systems    Objective:    /90, Pulse 75, Temp 98.5, O2 Sat 99%  Physical Exam   Constitutional: She is oriented to person, place, and time. She appears well-developed and well-nourished. No distress.   Occasional cough and sneezing during the visit.    HENT:   Right Ear: External ear normal.   Left Ear: External ear normal.   Ear canals clear and TMs normal, no effusions. Edema of nasal turbinates with clear nasal discharge, no sinus tenderness to percussion. Oropharynx mildly injected, no tonsillar edema or exudate.    Eyes: Conjunctivae are normal. Right eye exhibits no discharge. Left eye exhibits no discharge.   Neck: Normal range of motion. Neck supple. No JVD present.   Cardiovascular: Normal rate, regular rhythm and normal heart sounds.   Pulmonary/Chest: Effort normal. No accessory muscle usage or stridor. No tachypnea. No respiratory distress. She has no decreased breath sounds. She has no wheezes. She has no rales.   Mild upper airway rhonchi with equal breath sounds throughout.    Lymphadenopathy:     She has no cervical adenopathy.   Neurological: She is alert and  oriented to person, place, and time.   Skin: Skin is warm and dry.       Assessment:       1. Acute upper respiratory infection        Plan:       Acute upper respiratory infection  -     POCT Influenza A/B Molecular - NEGATIVE for both A and B.  -     Antihistamines, Flonase prn.     Acute bronchitis, unspecified organism  -     azithromycin (Z-CHIDI) 250 MG tablet; Take 2 tablets by mouth on day 1; Take 1 tablet by mouth on days 2-5  Dispense: 6 tablet; Refill: 0  -     albuterol sulfate (PROAIR RESPICLICK) 90 mcg/actuation AePB; Inhale 180 mcg into the lungs every 4 to 6 hours as needed (Wheezing.). Rescue  Dispense: 1 each; Refill: 0  -     dextromethorphan-guaifenesin (MUCINEX DM) 60-1,200 mg per 12 hr tablet; Take 1 tablet by mouth 2 (two) times daily as needed (Cough and chest congestion).; Refill: 0  -     Stay well hydrated. Call if any worsening wheezing or chest discomfort - consider adding Prednisone.

## 2020-03-02 NOTE — LETTER
March 2, 2020      [facility]  [address] [facility] [address]  Phone: [phone]  Fax: [phone]       Patient: Gisella Bennett   YOB: 1970  Date of Visit: 03/02/2020    To Whom It May Concern:    Dee Dee Bennett  was at [facility] on 03/02/2020. She may return to work/school on Wednesday 03/04/20 with no restrictions. If you have any questions or concerns, or if I can be of further assistance, please do not hesitate to contact me.    Sincerely,        Molly Frias MD

## 2020-03-24 DIAGNOSIS — H69.92 DYSFUNCTION OF LEFT EUSTACHIAN TUBE: ICD-10-CM

## 2020-03-24 DIAGNOSIS — J30.9 ALLERGIC RHINITIS, UNSPECIFIED SEASONALITY, UNSPECIFIED TRIGGER: Primary | ICD-10-CM

## 2020-03-24 RX ORDER — AZELASTINE 1 MG/ML
1 SPRAY, METERED NASAL 2 TIMES DAILY
Qty: 30 ML | Refills: 12 | Status: SHIPPED | OUTPATIENT
Start: 2020-03-24 | End: 2020-12-10 | Stop reason: SDUPTHER

## 2020-05-09 ENCOUNTER — PATIENT MESSAGE (OUTPATIENT)
Dept: OPTOMETRY | Facility: CLINIC | Age: 50
End: 2020-05-09

## 2020-05-13 ENCOUNTER — PATIENT OUTREACH (OUTPATIENT)
Dept: ADMINISTRATIVE | Facility: OTHER | Age: 50
End: 2020-05-13

## 2020-05-14 ENCOUNTER — OFFICE VISIT (OUTPATIENT)
Dept: OPTOMETRY | Facility: CLINIC | Age: 50
End: 2020-05-14
Payer: COMMERCIAL

## 2020-05-14 DIAGNOSIS — H04.123 DRY EYE SYNDROME, BILATERAL: ICD-10-CM

## 2020-05-14 DIAGNOSIS — H20.00 ACUTE IRITIS: Primary | ICD-10-CM

## 2020-05-14 DIAGNOSIS — H21.561 PUPIL IRREGULAR OF RIGHT EYE: ICD-10-CM

## 2020-05-14 PROCEDURE — 92012 PR EYE EXAM, EST PATIENT,INTERMED: ICD-10-PCS | Mod: S$GLB,,, | Performed by: OPTOMETRIST

## 2020-05-14 PROCEDURE — 99999 PR PBB SHADOW E&M-EST. PATIENT-LVL II: CPT | Mod: PBBFAC,,, | Performed by: OPTOMETRIST

## 2020-05-14 PROCEDURE — 99999 PR PBB SHADOW E&M-EST. PATIENT-LVL II: ICD-10-PCS | Mod: PBBFAC,,, | Performed by: OPTOMETRIST

## 2020-05-14 PROCEDURE — 92012 INTRM OPH EXAM EST PATIENT: CPT | Mod: S$GLB,,, | Performed by: OPTOMETRIST

## 2020-05-14 RX ORDER — DIFLUPREDNATE OPHTHALMIC 0.5 MG/ML
1 EMULSION OPHTHALMIC 4 TIMES DAILY
Qty: 2 ML | Refills: 0 | Status: SHIPPED | OUTPATIENT
Start: 2020-05-14 | End: 2020-05-21

## 2020-05-14 NOTE — PROGRESS NOTES
HPI     Last eye exam was 10/28/19 with .    Pt states she has been having some pressure and pain in OD since last   Friday. Pt states that the pain is dull and achy. Pt states slight   headaches with the pain.  Patient denies diplopia, headaches, flashes/floaters, and pain.  durizol 4 times a day OD, and she uses systane PRN,       Last edited by Roula Sanders on 5/14/2020  9:06 AM. (History)            Assessment /Plan     For exam results, see Encounter Report.    Acute iritis  Pupil irregular of right eye       difluprednate (DUREZOL) 0.05 % Drop ophthalmic solution; Place 1 drop into both eyes 4 (four) times daily. for 7 days  Then taper 3/2/1    Dry eye syndrome, bilateral   Use systane gel QID, pataday QAM, systane night ointment QHS OU      RTC if condition worsens or does not improve

## 2020-06-29 ENCOUNTER — OFFICE VISIT (OUTPATIENT)
Dept: URGENT CARE | Facility: CLINIC | Age: 50
End: 2020-06-29
Payer: COMMERCIAL

## 2020-06-29 VITALS
DIASTOLIC BLOOD PRESSURE: 84 MMHG | HEART RATE: 69 BPM | TEMPERATURE: 98 F | OXYGEN SATURATION: 96 % | RESPIRATION RATE: 18 BRPM | BODY MASS INDEX: 34.72 KG/M2 | SYSTOLIC BLOOD PRESSURE: 137 MMHG | WEIGHT: 196 LBS

## 2020-06-29 DIAGNOSIS — Z11.9 ENCOUNTER FOR SCREENING EXAMINATION FOR INFECTIOUS DISEASE: Primary | ICD-10-CM

## 2020-06-29 DIAGNOSIS — J30.9 ALLERGIC RHINITIS, UNSPECIFIED SEASONALITY, UNSPECIFIED TRIGGER: ICD-10-CM

## 2020-06-29 DIAGNOSIS — R52 BODY ACHES: ICD-10-CM

## 2020-06-29 PROCEDURE — 99214 PR OFFICE/OUTPT VISIT, EST, LEVL IV, 30-39 MIN: ICD-10-PCS | Mod: S$GLB,,, | Performed by: FAMILY MEDICINE

## 2020-06-29 PROCEDURE — U0003 INFECTIOUS AGENT DETECTION BY NUCLEIC ACID (DNA OR RNA); SEVERE ACUTE RESPIRATORY SYNDROME CORONAVIRUS 2 (SARS-COV-2) (CORONAVIRUS DISEASE [COVID-19]), AMPLIFIED PROBE TECHNIQUE, MAKING USE OF HIGH THROUGHPUT TECHNOLOGIES AS DESCRIBED BY CMS-2020-01-R: HCPCS

## 2020-06-29 PROCEDURE — 99214 OFFICE O/P EST MOD 30 MIN: CPT | Mod: S$GLB,,, | Performed by: FAMILY MEDICINE

## 2020-06-29 NOTE — PATIENT INSTRUCTIONS
PLEASE READ YOUR DISCHARGE INSTRUCTIONS ENTIRELY AS IT CONTAINS IMPORTANT INFORMATION.    Please go home and quarantine yourself until your testing results    Continue your astelin, flonase and antihistamines    Monitor for any worsening symptoms if anything severely worsening please go to the ER immediately.     Tylenol and ibuprofen as needed    Drink plenty of fluids rest    Wash hands frequently cover your cough and sneeze    Instructions for Home Care of Patients and Caretakers with Coronavirus Disease 2019     Limit visitors to the home.  Older persons and those that have chronic medical conditions such as diabetes, lung and heart disease are at increased risk for illness.    If possible, patients should use a separate bedroom while recovering. Caregivers and household members should avoid prolonged contact with the patient which means to stay 6 feet away and avoid contact with cough droplets.  When close contact is necessary, wash your hands before and immediately after contact.    Perform hand hygiene frequently. Wash your hands often with soap and water for at least 20 seconds or use an alcohol-based hand , covering all surfaces of your hands and rubbing them together until they feel dry.    Avoid touching your eyes, nose, and mouth with unwashed hands.   Avoid sharing household items with the patient. You should not share dishes, drinking glasses, cups, eating utensils, towels, bedding, or other items. After the patient uses these items, you should wash them thoroughly.   Wash laundry thoroughly.   o Immediately remove and wash clothes or bedding that have blood, stool, or body fluids on them.   Clean all high-touch surfaces, such as counters, tabletops, doorknobs, bathroom fixtures, toilets, phones, keyboards, tablets, and bedside tables, every day.   o Use a household cleaning spray or wipe, according to the label instructions. Labels contain instructions for safe and effective use of  the cleaning product including precautions you should take when applying the product, such as wearing gloves and making sure you have good ventilation during use of the product.    For more information see CDC link below.      https://www.cdc.gov/coronavirus/2019-ncov/hcp/guidance-prevent-spread.html#precautions               If your symptoms worsen or if you have any other concerns, please contact [facility] at [phone].       You must understand that you have received an Urgent Care treatment only and that you may be released before all of your medical problems are known or treated.      Allergic Rhinitis  Allergic rhinitis is an allergic reaction that affects the nose, and often the eyes. Its often known as nasal allergies. Nasal allergies are often due to things in the environment that are breathed in. Depending what you are sensitive to, nasal allergies may occur only during certain seasons. Or they may occur year round. Common indoor allergens include house dust mites, mold, cockroaches, and pet dander. Outdoor allergens include pollen from trees, grasses, and weeds.   Symptoms include a drippy, stuffy, and itchy nose. They also include sneezing and red and itchy eyes. You may feel tired more often. Severe allergies may also affect your breathing and trigger a condition called asthma.   Tests can be done to see what allergens are affecting you. You may be referred to an allergy specialist for testing and further evaluation.  Home care  Your healthcare provider may prescribe medicines to help relieve allergy symptoms. These may include oral medicines, nasal sprays, or eye drops.  Ask your provider for advice on how to avoid substances that you are allergic to. Below are a few tips for each type of allergen.  Pet dander:  · Do not have pets with fur and feathers.  · If you can't avoid having a pet, keep it out of your bedroom and off upholstered furniture.  Pollen:  · When pollen counts are high, keep  windows of your car and home closed. If possible, use an air conditioner instead.  · Wear a filter mask when mowing or doing yard work.  House dust mites:  · Wash bedding every week in warm water and detergent and dry on a hot setting.  · Cover the mattress, box spring, and pillows with allergy covers.   · If possible, sleep in a room with no carpet, curtains, or upholstered furniture.  Cockroaches:  · Store food in sealed containers.  · Remove garbage from the home promptly.  · Fix water leaks  Mold:  · Keep humidity low by using a dehumidifier or air conditioner. Keep the dehumidifier and air conditioner clean and free of mold.  · Clean moldy areas with bleach and water.  In general:  · Vacuum once or twice a week. If possible, use a vacuum with a high-efficiency particulate air (HEPA) filter.  · Do not smoke. Avoid cigarette smoke. Cigarette smoke is an irritant that can make symptoms worse.  Follow-up care  Follow up as advised by the healthcare provider or our staff. If you were referred to an allergy specialist, make this appointment promptly.  When to seek medical advice  Call your healthcare provider right away if the following occur:  · Coughing or wheezing  · Fever greater than 100.4°F (38°C)  · Hives (raised red bumps)  · Continuing symptoms, new symptoms, or worsening symptoms  Call 911 right away if you have:  · Trouble breathing  · Severe swelling of the face or severe itching of the eyes or mouth  Date Last Reviewed: 3/1/2017  © 4245-1733 [employer]. [address] [place] [address], [place] [address]. All rights reserved. This information is not intended as a substitute for professional medical care. Always follow your healthcare professional's instructions.

## 2020-06-29 NOTE — PROGRESS NOTES
Subjective:       Patient ID: Gisella Bennett is a 49 y.o. female.    Vitals:  weight is 88.9 kg (196 lb). Her temperature is 98.1 °F (36.7 °C). Her blood pressure is 137/84 and her pulse is 69. Her respiration is 18 and oxygen saturation is 96%.     Chief Complaint: COVID-19 Concerns, Generalized Body Aches (2 WEEKS AGO ), and Sinus Problem    Patient states intermittent body aches starting 2 weeks ago and symptoms consistent with her seasonal allergies.  Sinus congestion postnasal drip.  Symptoms actually improving.  No fever cough shortness of breath.  She wanted to make sure her COVID test is negative.  Otherwise feeling fine.    Sinus Problem  This is a recurrent problem. The current episode started 1 to 4 weeks ago. The problem has been gradually worsening since onset. There has been no fever. Her pain is at a severity of 4/10. The pain is mild. Associated symptoms include headaches, sinus pressure and sneezing. Pertinent negatives include no chills, congestion, coughing, diaphoresis, ear pain, shortness of breath or sore throat. Treatments tried: OTC SINUS MEDS   The treatment provided mild relief.       Constitution: Negative for chills, sweating, fatigue and fever.   HENT: Positive for postnasal drip, sinus pain and sinus pressure. Negative for ear pain, congestion, sore throat and voice change.    Neck: Negative for painful lymph nodes.   Eyes: Negative for eye redness.   Respiratory: Negative for chest tightness, cough, sputum production, bloody sputum, COPD, shortness of breath, stridor, wheezing and asthma.    Gastrointestinal: Negative for nausea and vomiting.   Musculoskeletal: Positive for muscle ache.   Skin: Negative for rash.   Allergic/Immunologic: Positive for sneezing. Negative for seasonal allergies and asthma.   Neurological: Positive for headaches.   Hematologic/Lymphatic: Negative for swollen lymph nodes.       Objective:      Physical Exam   Constitutional: She is oriented to person,  place, and time. She appears well-developed. She is cooperative.  Non-toxic appearance. She does not appear ill. No distress.   HENT:   Head: Normocephalic and atraumatic.   Right Ear: Hearing, external ear and ear canal normal. Tympanic membrane is bulging. A middle ear effusion (Clear) is present.   Left Ear: Hearing, tympanic membrane, external ear and ear canal normal. Tympanic membrane is not bulging.  No middle ear effusion.   Nose: Right sinus exhibits no maxillary sinus tenderness and no frontal sinus tenderness. Left sinus exhibits no maxillary sinus tenderness and no frontal sinus tenderness.   Pts Mask was not removed to decrease any transmission of viruses, pt  does not have a muffled voice        Comments: Pts Mask was not removed to decrease any transmission of viruses, pt  does not have a muffled voice    Eyes: Conjunctivae and lids are normal. No scleral icterus.   Neck: Trachea normal, full passive range of motion without pain and phonation normal. Neck supple. No neck rigidity. No edema and no erythema present.   Cardiovascular: Normal rate, regular rhythm, normal heart sounds and normal pulses.   Pulmonary/Chest: Effort normal and breath sounds normal. No accessory muscle usage. No tachypnea. No respiratory distress. She has no decreased breath sounds. She has no wheezes. She has no rhonchi. She has no rales.   NAD able to speak in clear complete sentences    Comments: NAD able to speak in clear complete sentences    Abdominal: Normal appearance.   Musculoskeletal: Normal range of motion.         General: No deformity.   Neurological: She is alert and oriented to person, place, and time. She exhibits normal muscle tone. Coordination normal.   Skin: Skin is warm, dry, intact, not diaphoretic and not pale.   Psychiatric: Her speech is normal and behavior is normal. Judgment and thought content normal.   Nursing note and vitals reviewed.        Assessment:       1. Encounter for screening examination  for infectious disease    2. Body aches    3. Allergic rhinitis, unspecified seasonality, unspecified trigger        Plan:         Encounter for screening examination for infectious disease    Body aches  -     COVID-19 Routine Screening    Allergic rhinitis, unspecified seasonality, unspecified trigger    do not suspect bacterial sinusitis at this time, sx feel consistent for AR and sx improving    Patient Instructions   PLEASE READ YOUR DISCHARGE INSTRUCTIONS ENTIRELY AS IT CONTAINS IMPORTANT INFORMATION.    Please go home and quarantine yourself until your testing results    Continue your astelin, flonase and antihistamines    Monitor for any worsening symptoms if anything severely worsening please go to the ER immediately.     Tylenol and ibuprofen as needed    Drink plenty of fluids rest    Wash hands frequently cover your cough and sneeze    Instructions for Home Care of Patients and Caretakers with Coronavirus Disease 2019     Limit visitors to the home.  Older persons and those that have chronic medical conditions such as diabetes, lung and heart disease are at increased risk for illness.    If possible, patients should use a separate bedroom while recovering. Caregivers and household members should avoid prolonged contact with the patient which means to stay 6 feet away and avoid contact with cough droplets.  When close contact is necessary, wash your hands before and immediately after contact.    Perform hand hygiene frequently. Wash your hands often with soap and water for at least 20 seconds or use an alcohol-based hand , covering all surfaces of your hands and rubbing them together until they feel dry.    Avoid touching your eyes, nose, and mouth with unwashed hands.   Avoid sharing household items with the patient. You should not share dishes, drinking glasses, cups, eating utensils, towels, bedding, or other items. After the patient uses these items, you should wash them  thoroughly.   Wash laundry thoroughly.   o Immediately remove and wash clothes or bedding that have blood, stool, or body fluids on them.   Clean all high-touch surfaces, such as counters, tabletops, doorknobs, bathroom fixtures, toilets, phones, keyboards, tablets, and bedside tables, every day.   o Use a household cleaning spray or wipe, according to the label instructions. Labels contain instructions for safe and effective use of the cleaning product including precautions you should take when applying the product, such as wearing gloves and making sure you have good ventilation during use of the product.    For more information see CDC link below.      https://www.cdc.gov/coronavirus/2019-ncov/hcp/guidance-prevent-spread.html#precautions               If your symptoms worsen or if you have any other concerns, please contact [facility] at [phone].       You must understand that you have received an Urgent Care treatment only and that you may be released before all of your medical problems are known or treated.      Allergic Rhinitis  Allergic rhinitis is an allergic reaction that affects the nose, and often the eyes. Its often known as nasal allergies. Nasal allergies are often due to things in the environment that are breathed in. Depending what you are sensitive to, nasal allergies may occur only during certain seasons. Or they may occur year round. Common indoor allergens include house dust mites, mold, cockroaches, and pet dander. Outdoor allergens include pollen from trees, grasses, and weeds.   Symptoms include a drippy, stuffy, and itchy nose. They also include sneezing and red and itchy eyes. You may feel tired more often. Severe allergies may also affect your breathing and trigger a condition called asthma.   Tests can be done to see what allergens are affecting you. You may be referred to an allergy specialist for testing and further evaluation.  Home care  Your healthcare provider may  prescribe medicines to help relieve allergy symptoms. These may include oral medicines, nasal sprays, or eye drops.  Ask your provider for advice on how to avoid substances that you are allergic to. Below are a few tips for each type of allergen.  Pet dander:  · Do not have pets with fur and feathers.  · If you can't avoid having a pet, keep it out of your bedroom and off upholstered furniture.  Pollen:  · When pollen counts are high, keep windows of your car and home closed. If possible, use an air conditioner instead.  · Wear a filter mask when mowing or doing yard work.  House dust mites:  · Wash bedding every week in warm water and detergent and dry on a hot setting.  · Cover the mattress, box spring, and pillows with allergy covers.   · If possible, sleep in a room with no carpet, curtains, or upholstered furniture.  Cockroaches:  · Store food in sealed containers.  · Remove garbage from the home promptly.  · Fix water leaks  Mold:  · Keep humidity low by using a dehumidifier or air conditioner. Keep the dehumidifier and air conditioner clean and free of mold.  · Clean moldy areas with bleach and water.  In general:  · Vacuum once or twice a week. If possible, use a vacuum with a high-efficiency particulate air (HEPA) filter.  · Do not smoke. Avoid cigarette smoke. Cigarette smoke is an irritant that can make symptoms worse.  Follow-up care  Follow up as advised by the healthcare provider or our staff. If you were referred to an allergy specialist, make this appointment promptly.  When to seek medical advice  Call your healthcare provider right away if the following occur:  · Coughing or wheezing  · Fever greater than 100.4°F (38°C)  · Hives (raised red bumps)  · Continuing symptoms, new symptoms, or worsening symptoms  Call 911 right away if you have:  · Trouble breathing  · Severe swelling of the face or severe itching of the eyes or mouth  Date Last Reviewed: 3/1/2017  © 9577-9255 [employer]  [address] [place] [address]. All rights reserved. This information is not intended as a substitute for professional medical care. Always follow your healthcare professional's instructions.

## 2020-07-03 LAB — SARS-COV-2 RNA RESP QL NAA+PROBE: NOT DETECTED

## 2020-09-22 ENCOUNTER — PATIENT OUTREACH (OUTPATIENT)
Dept: ADMINISTRATIVE | Facility: HOSPITAL | Age: 50
End: 2020-09-22

## 2020-09-22 NOTE — PROGRESS NOTES
Kathya review completed  Left telephone message and portal message to obtain recent blood pressure update.

## 2020-10-08 ENCOUNTER — TELEPHONE (OUTPATIENT)
Dept: INTERNAL MEDICINE | Facility: CLINIC | Age: 50
End: 2020-10-08

## 2020-10-30 DIAGNOSIS — Z12.31 OTHER SCREENING MAMMOGRAM: ICD-10-CM

## 2020-11-07 ENCOUNTER — PATIENT MESSAGE (OUTPATIENT)
Dept: OBSTETRICS AND GYNECOLOGY | Facility: CLINIC | Age: 50
End: 2020-11-07

## 2020-11-09 ENCOUNTER — OFFICE VISIT (OUTPATIENT)
Dept: INTERNAL MEDICINE | Facility: CLINIC | Age: 50
End: 2020-11-09
Payer: COMMERCIAL

## 2020-11-09 VITALS
HEIGHT: 64 IN | WEIGHT: 206.38 LBS | BODY MASS INDEX: 35.23 KG/M2 | OXYGEN SATURATION: 99 % | SYSTOLIC BLOOD PRESSURE: 124 MMHG | DIASTOLIC BLOOD PRESSURE: 90 MMHG | HEART RATE: 87 BPM

## 2020-11-09 DIAGNOSIS — E66.9 OBESITY (BMI 35.0-39.9 WITHOUT COMORBIDITY): ICD-10-CM

## 2020-11-09 DIAGNOSIS — I10 ESSENTIAL HYPERTENSION: Primary | ICD-10-CM

## 2020-11-09 PROCEDURE — 3074F SYST BP LT 130 MM HG: CPT | Mod: CPTII,S$GLB,, | Performed by: NURSE PRACTITIONER

## 2020-11-09 PROCEDURE — 99999 PR PBB SHADOW E&M-EST. PATIENT-LVL IV: CPT | Mod: PBBFAC,,, | Performed by: NURSE PRACTITIONER

## 2020-11-09 PROCEDURE — 3008F BODY MASS INDEX DOCD: CPT | Mod: CPTII,S$GLB,, | Performed by: NURSE PRACTITIONER

## 2020-11-09 PROCEDURE — 3080F PR MOST RECENT DIASTOLIC BLOOD PRESSURE >= 90 MM HG: ICD-10-PCS | Mod: CPTII,S$GLB,, | Performed by: NURSE PRACTITIONER

## 2020-11-09 PROCEDURE — 99214 OFFICE O/P EST MOD 30 MIN: CPT | Mod: S$GLB,,, | Performed by: NURSE PRACTITIONER

## 2020-11-09 PROCEDURE — 3074F PR MOST RECENT SYSTOLIC BLOOD PRESSURE < 130 MM HG: ICD-10-PCS | Mod: CPTII,S$GLB,, | Performed by: NURSE PRACTITIONER

## 2020-11-09 PROCEDURE — 3008F PR BODY MASS INDEX (BMI) DOCUMENTED: ICD-10-PCS | Mod: CPTII,S$GLB,, | Performed by: NURSE PRACTITIONER

## 2020-11-09 PROCEDURE — 3080F DIAST BP >= 90 MM HG: CPT | Mod: CPTII,S$GLB,, | Performed by: NURSE PRACTITIONER

## 2020-11-09 PROCEDURE — 99214 PR OFFICE/OUTPT VISIT, EST, LEVL IV, 30-39 MIN: ICD-10-PCS | Mod: S$GLB,,, | Performed by: NURSE PRACTITIONER

## 2020-11-09 PROCEDURE — 99999 PR PBB SHADOW E&M-EST. PATIENT-LVL IV: ICD-10-PCS | Mod: PBBFAC,,, | Performed by: NURSE PRACTITIONER

## 2020-11-09 RX ORDER — NORELGESTROMIN AND ETHINYL ESTRADIOL 35; 150 UG/MG; UG/MG
1 PATCH TRANSDERMAL
Qty: 4 PATCH | Refills: 2 | Status: SHIPPED | OUTPATIENT
Start: 2020-11-09 | End: 2021-01-06 | Stop reason: SDUPTHER

## 2020-11-09 RX ORDER — AMLODIPINE BESYLATE 5 MG/1
5 TABLET ORAL DAILY
Qty: 90 TABLET | Refills: 0 | Status: SHIPPED | OUTPATIENT
Start: 2020-11-09 | End: 2020-12-10 | Stop reason: SDUPTHER

## 2020-11-09 RX ORDER — HYDROCHLOROTHIAZIDE 12.5 MG/1
12.5 CAPSULE ORAL DAILY
Qty: 90 CAPSULE | Refills: 0 | Status: SHIPPED | OUTPATIENT
Start: 2020-11-09 | End: 2020-12-10 | Stop reason: SDUPTHER

## 2020-11-09 NOTE — PROGRESS NOTES
Subjective:       Patient ID: Gisella Bennett is a 50 y.o. female.    Chief Complaint: Blood Pressure Check (high )    Pt of Dr. Hale, here for HBP. Last OV with PCP 3/2019. On HCTZ 12.5mg daily. Reports some headaches. Watches salt in diet. Has not been exercising. Reports compliance with diet.    Has appt to establish with Dr. Frias on 12-10-20    Reports having a lot of stress lately.    Review of Systems   Constitutional: Positive for unexpected weight change. Negative for chills and fever.        Weight gain 10 lbs reported   Eyes: Negative for visual disturbance.   Respiratory: Negative for chest tightness and shortness of breath.    Cardiovascular: Negative for chest pain, palpitations, leg swelling and claudication.   Gastrointestinal: Negative for abdominal pain, diarrhea, nausea and vomiting.   Endocrine: Negative for cold intolerance, heat intolerance, polydipsia, polyphagia and polyuria.   Genitourinary: Negative for dysuria.   Musculoskeletal: Negative for arthralgias, back pain, myalgias, neck pain and neck stiffness.   Allergic/Immunologic: Negative for environmental allergies, food allergies and immunocompromised state.   Neurological: Positive for headaches. Negative for dizziness, syncope, weakness, light-headedness, numbness, disturbances in coordination and coordination difficulties.   Hematological: Negative for adenopathy. Does not bruise/bleed easily.   Psychiatric/Behavioral: Negative for suicidal ideas.        Review of patient's allergies indicates:   Allergen Reactions    Codeine Other (See Comments)     Tremors    Nuts [tree nut] Anaphylaxis    Avocado (laurus persea) Itching, Nausea And Vomiting and Other (See Comments)     Current Outpatient Medications:     albuterol sulfate (PROAIR RESPICLICK) 90 mcg/actuation AePB, Inhale 180 mcg into the lungs every 4 to 6 hours as needed (Wheezing.). Rescue, Disp: 1 each, Rfl: 0    azelastine (ASTELIN) 137 mcg (0.1 %) nasal spray,  1 spray (137 mcg total) by Nasal route 2 (two) times daily., Disp: 30 mL, Rfl: 12    cholecalciferol, vitamin D3, 50,000 unit capsule, TK 1 C PO 2 TIMES Q WEEK, Disp: , Rfl: 2    EPIPEN 2-CHIDI 0.3 mg/0.3 mL (1:1,000) AtIn, , Disp: , Rfl:     fexofenadine (ALLEGRA) 60 MG tablet, Take 60 mg by mouth once daily., Disp: , Rfl:     fluticasone (FLONASE) 50 mcg/actuation nasal spray, 2 sprays by Each Nare route as needed. , Disp: , Rfl:     hydroCHLOROthiazide (MICROZIDE) 12.5 mg capsule, Take 12.5 mg by mouth once daily. , Disp: , Rfl:     montelukast (SINGULAIR) 10 mg tablet, TK 1 T PO  QHS, Disp: , Rfl: 5    norelgestromin-ethinyl estradiol (ORTHO EVRA) 150-35 mcg/24 hr, Place 1 patch onto the skin every 7 days., Disp: 4 patch, Rfl: 2    ergocalciferol (ERGOCALCIFEROL) 50,000 unit Cap, TAKE 1 CAPSULE BY MOUTH EVERY 7 DAYS, Disp: 12 capsule, Rfl: 0    omeprazole (PRILOSEC) 40 MG capsule, Take 1 capsule (40 mg total) by mouth before breakfast. Take on empty stomach 30-60 minutes before meal (Patient taking differently: Take 10 mg by mouth before breakfast. Take on empty stomach 30-60 minutes before meal), Disp: 30 capsule, Rfl: 11    Patient Active Problem List   Diagnosis    Vitamin D deficiency disease    Trigeminal neuralgia    Atopic dermatitis    Environmental allergies    Cough    Laryngopharyngeal reflux (LPR)    Seasonal rhinitis    Uveitis    AMALIA positive    Fatigue    Essential hypertension    Insomnia    Severe obesity (BMI 35.0-39.9) with comorbidity    Palpitations    Family history of first degree relative with bicuspid aortic valve     Past Medical History:   Diagnosis Date    Atopic dermatitis     Environmental allergies 3/29/2016    Lower back pain 3/29/2016    Trigeminal neuralgia 4/14/2015    Uveitis of right eye 2003, 2018    Vitamin D deficiency disease 2/16/2015     Past Surgical History:   Procedure Laterality Date    INTRAUTERINE DEVICE INSERTION  2013    MIRENA     SPINAL FUSION  04/2013    Spinal fusion, laminectomies     Social History     Socioeconomic History    Marital status:      Spouse name: Not on file    Number of children: Not on file    Years of education: Not on file    Highest education level: Not on file   Occupational History    Not on file   Social Needs    Financial resource strain: Not on file    Food insecurity     Worry: Not on file     Inability: Not on file    Transportation needs     Medical: Not on file     Non-medical: Not on file   Tobacco Use    Smoking status: Never Smoker    Smokeless tobacco: Never Used    Tobacco comment: [employer];     Substance and Sexual Activity    Alcohol use: Not Currently     Comment: one glass twice a week at the most    Drug use: No    Sexual activity: Yes     Partners: Male     Birth control/protection: I.U.D.   Lifestyle    Physical activity     Days per week: Not on file     Minutes per session: Not on file    Stress: Not on file   Relationships    Social connections     Talks on phone: Not on file     Gets together: Not on file     Attends Yarsani service: Not on file     Active member of club or organization: Not on file     Attends meetings of clubs or organizations: Not on file     Relationship status: Not on file   Other Topics Concern    Not on file   Social History Narrative    Patient recently       Family History   Problem Relation Age of Onset    Heart disease Father 56        congenital defect, aortic replacement    Kidney disease Father         after Cardiac study     Diabetes Mellitus Father     Thyroid disease Mother         Goiter removed    No Known Problems Paternal Grandfather     No Known Problems Paternal Grandmother     No Known Problems Maternal Grandmother     No Known Problems Maternal Grandfather     Eczema Brother     Breast cancer Maternal Aunt     Sarcoidosis Cousin 55        lung involvement    Asthma Neg Hx     Emphysema Neg Hx   "   Blindness Neg Hx     Glaucoma Neg Hx     Macular degeneration Neg Hx     Retinal detachment Neg Hx     Inflammatory bowel disease Neg Hx     Lupus Neg Hx     Psoriasis Neg Hx     Rheum arthritis Neg Hx     Colon cancer Neg Hx     Ovarian cancer Neg Hx        Objective:       Vitals:    11/09/20 1725   BP: (!) 124/90   Pulse: 87   SpO2: 99%   Weight: 93.6 kg (206 lb 5.6 oz)   Height: 5' 4" (1.626 m)   PainSc: 0-No pain     Body mass index is 35.42 kg/m².    Physical Exam  Vitals signs and nursing note reviewed.   Constitutional:       Appearance: She is obese.   HENT:      Head: Normocephalic.      Nose: Nose normal.      Mouth/Throat:      Mouth: Mucous membranes are moist.      Pharynx: Oropharynx is clear.   Eyes:      Extraocular Movements: Extraocular movements intact.      Conjunctiva/sclera: Conjunctivae normal.      Pupils: Pupils are equal, round, and reactive to light.   Neck:      Musculoskeletal: Normal range of motion and neck supple.      Vascular: No carotid bruit.   Cardiovascular:      Rate and Rhythm: Regular rhythm.      Pulses: Normal pulses.      Heart sounds: Normal heart sounds. No murmur. No friction rub. No gallop.    Pulmonary:      Effort: Pulmonary effort is normal.      Breath sounds: Normal breath sounds.   Musculoskeletal: Normal range of motion.   Skin:     General: Skin is warm and dry.      Capillary Refill: Capillary refill takes less than 2 seconds.   Neurological:      General: No focal deficit present.      Mental Status: She is alert and oriented to person, place, and time.   Psychiatric:         Mood and Affect: Mood normal.         Behavior: Behavior normal.         Thought Content: Thought content normal.         Judgment: Judgment normal.         Assessment:       1. Essential hypertension    2. BMI 35.0-35.9,adult    3. Obesity (BMI 35.0-39.9 without comorbidity)        Plan:       Gisella was seen today for blood pressure check.    Diagnoses and all orders for " this visit:    Essential hypertension  -     hydroCHLOROthiazide (MICROZIDE) 12.5 mg capsule; Take 1 capsule (12.5 mg total) by mouth once daily.  -     amLODIPine (NORVASC) 5 MG tablet; Take 1 tablet (5 mg total) by mouth once daily.    Continue HCTZ 12.5mg daily and add Amlodipine 5mg daily for blood pressure    Take medications as prescribed.    Monitor BP at home, goal BP < or = 140/80, call office if consistently above this range. Check twice a week and on those days in AM and PM in quiet room, legs uncrossed    Follow low salt DASH diet and exercise.    BMI reviewed.    Go to ED if Headaches, blurred vision, chest pain, or SOB occurs along with elevated readings > or = 160/90.    Bring readings to your appt on 12-10-20 with Dr. Frias for review    Self care instructions provided in AVS    BMI 35.0-35.9,adult  BMI reviewed    Obesity (BMI 35.0-39.9 without comorbidity)  BMI reviewed.    Diet and exercise to lose weight.    Follow up in about 31 days (around 12/10/2020) for as scheduled with Dr. Frias.

## 2020-11-09 NOTE — PATIENT INSTRUCTIONS
Continue HCTZ 12.5mg daily and add Amlodipine 5mg daily for blood pressure    Take medications as prescribed.    Monitor BP at home, goal BP < or = 140/80, call office if consistently above this range. Check twice a week and on those days in AM and PM in quiet room, legs uncrossed    Follow low salt DASH diet and exercise.    BMI reviewed.    Go to ED if Headaches, blurred vision, chest pain, or SOB occurs along with elevated readings > or = 160/90.    Bring readings to your appt on 12-10-20 with Dr. Frias for review      Amlodipine tablets  What is this medicine?  AMLODIPINE (am SAMIA di peen) is a calcium-channel blocker. It affects the amount of calcium found in your heart and muscle cells. This relaxes your blood vessels, which can reduce the amount of work the heart has to do. This medicine is used to lower high blood pressure. It is also used to prevent chest pain.  How should I use this medicine?  Take this medicine by mouth with a glass of water. Follow the directions on the prescription label. Take your medicine at regular intervals. Do not take more medicine than directed.  Talk to your pediatrician regarding the use of this medicine in children. Special care may be needed. This medicine has been used in children as young as 6.  Persons over 65 years old may have a stronger reaction to this medicine and need smaller doses.  What side effects may I notice from receiving this medicine?  Side effects that you should report to your doctor or health care professional as soon as possible:  · allergic reactions like skin rash, itching or hives, swelling of the face, lips, or tongue  · breathing problems  · changes in vision or hearing  · chest pain  · fast, irregular heartbeat  · swelling of legs or ankles  Side effects that usually do not require medical attention (report to your doctor or health care professional if they continue or are bothersome):  · dry mouth  · facial flushing  · nausea,  vomiting  · stomach gas, pain  · tired, weak  · trouble sleeping  What may interact with this medicine?  · herbal or dietary supplements  · local or general anesthetics  · medicines for high blood pressure  · medicines for prostate problems  · rifampin  What if I miss a dose?  If you miss a dose, take it as soon as you can. If it is almost time for your next dose, take only that dose. Do not take double or extra doses.  Where should I keep my medicine?  Keep out of the reach of children.  Store at room temperature between 59 and 86 degrees F (15 and 30 degrees C). Protect from light. Keep container tightly closed. Throw away any unused medicine after the expiration date.  What should I tell my health care provider before I take this medicine?  They need to know if you have any of these conditions:  · heart problems like heart failure or aortic stenosis  · liver disease  · an unusual or allergic reaction to amlodipine, other medicines, foods, dyes, or preservatives  · pregnant or trying to get pregnant  · breast-feeding  What should I watch for while using this medicine?  Visit your doctor or health care professional for regular check ups. Check your blood pressure and pulse rate regularly. Ask your health care professional what your blood pressure and pulse rate should be, and when you should contact him or her.  This medicine may make you feel confused, dizzy or lightheaded. Do not drive, use machinery, or do anything that needs mental alertness until you know how this medicine affects you. To reduce the risk of dizzy or fainting spells, do not sit or stand up quickly, especially if you are an older patient. Avoid alcoholic drinks; they can make you more dizzy.  Do not suddenly stop taking amlodipine. Ask your doctor or health care professional how you can gradually reduce the dose.  NOTE:This sheet is a summary. It may not cover all possible information. If you have questions about this medicine, talk to your  doctor, pharmacist, or health care provider. Copyright© 2017 [employer]      Hydrochlorothiazide, HCTZ capsules or tablets  What is this medicine?  HYDROCHLOROTHIAZIDE (roxana droe klor oh THYE a zide) is a diuretic. It increases the amount of urine passed, which causes the body to lose salt and water. This medicine is used to treat high blood pressure. It is also reduces the swelling and water retention caused by various medical conditions, such as heart, liver, or kidney disease.  How should I use this medicine?  Take this medicine by mouth with a glass of water. Follow the directions on the prescription label. Take your medicine at regular intervals. Remember that you will need to pass urine frequently after taking this medicine. Do not take your doses at a time of day that will cause you problems. Do not stop taking your medicine unless your doctor tells you to.  Talk to your pediatrician regarding the use of this medicine in children. Special care may be needed.  What side effects may I notice from receiving this medicine?  Side effects that you should report to your doctor or health care professional as soon as possible:  · allergic reactions such as skin rash or itching, hives, swelling of the lips, mouth, tongue, or throat  · changes in vision  · chest pain  · eye pain  · fast or irregular heartbeat  · feeling faint or lightheaded, falls  · gout attack  · muscle pain or cramps  · pain or difficulty when passing urine  · pain, tingling, numbness in the hands or feet  · redness, blistering, peeling or loosening of the skin, including inside the mouth  · unusually weak or tired  Side effects that usually do not require medical attention (report to your doctor or health care professional if they continue or are bothersome):  · change in sex drive or performance  · dry mouth  · headache  · stomach upset  What may interact with this  medicine?  · cholestyramine  · colestipol  · digoxin  · dofetilide  · lithium  · medicines for blood pressure  · medicines for diabetes  · medicines that relax muscles for surgery  · other diuretics  · steroid medicines like prednisone or cortisone  What if I miss a dose?  If you miss a dose, take it as soon as you can. If it is almost time for your next dose, take only that dose. Do not take double or extra doses.  Where should I keep my medicine?  Keep out of the reach of children.  Store at room temperature between 15 and 30 degrees C (59 and 86 degrees F). Do not freeze. Protect from light and moisture. Keep container closed tightly. Throw away any unused medicine after the expiration date.  What should I tell my health care provider before I take this medicine?  They need to know if you have any of these conditions:  · diabetes  · gout  · immune system problems, like lupus  · kidney disease or kidney stones  · liver disease  · pancreatitis  · small amount of urine or difficulty passing urine  · an unusual or allergic reaction to hydrochlorothiazide, sulfa drugs, other medicines, foods, dyes, or preservatives  · pregnant or trying to get pregnant  · breast-feeding  What should I watch for while using this medicine?  Visit your doctor or health care professional for regular checks on your progress. Check your blood pressure as directed. Ask your doctor or health care professional what your blood pressure should be and when you should contact him or her.  You may need to be on a special diet while taking this medicine. Ask your doctor.  Check with your doctor or health care professional if you get an attack of severe diarrhea, nausea and vomiting, or if you sweat a lot. The loss of too much body fluid can make it dangerous for you to take this medicine.  You may get drowsy or dizzy. Do not drive, use machinery, or do anything that needs mental alertness until you know how this medicine affects you. Do not stand or  sit up quickly, especially if you are an older patient. This reduces the risk of dizzy or fainting spells. Alcohol may interfere with the effect of this medicine. Avoid alcoholic drinks.  This medicine may affect your blood sugar level. If you have diabetes, check with your doctor or health care professional before changing the dose of your diabetic medicine.  This medicine can make you more sensitive to the sun. Keep out of the sun. If you cannot avoid being in the sun, wear protective clothing and use sunscreen. Do not use sun lamps or tanning beds/booths.  NOTE:This sheet is a summary. It may not cover all possible information. If you have questions about this medicine, talk to your doctor, pharmacist, or health care provider. Copyright© 2017 [employer]      Established High Blood Pressure    High blood pressure (hypertension) is a chronic disease. Often, healthcare providers dont know what causes it. But it can be caused by certain health conditions and medicines.  If you have high blood pressure, you may not have any symptoms. If you do have symptoms, they may include headache, dizziness, changes in your vision, chest pain, and shortness of breath. But even without symptoms, high blood pressure thats not treated raises your risk for heart attack and stroke. High blood pressure is a serious health risk and shouldnt be ignored.  A blood pressure reading is made up of two numbers: a higher number over a lower number. The top number is the systolic pressure. The bottom number is the diastolic pressure. A normal blood pressure is a systolic pressure of  less than 120 over a diastolic pressure of less than 80. You will see your blood pressure readings written together. For example, a person with a systolic pressure of 188 and a diastolic pressure of 78 will have 118/78 written in the medical record.  High blood pressure is when either the top number is 140 or higher, or the bottom number is 90 or higher. This  must be the result when taking your blood pressure a number of times. The blood pressures between normal and high are called prehypertension.  Home care  If you have high blood pressure, you should do what is listed below to lower your blood pressure. If you are taking medicines for high blood pressure, these methods may reduce or end your need for medicines in the future.  · Begin a weight-loss program if you are overweight.  · Cut back on how much salt you get in your diet. Heres how to do this:  ¨ Dont eat foods that have a lot of salt. These include olives, pickles, smoked meats, and salted potato chips.  ¨ Dont add salt to your food at the table.  ¨ Use only small amounts of salt when cooking.  · Start an exercise program. Talk with your healthcare provider about the type of exercise program that would be best for you. It doesn't have to be hard. Even brisk walking for 20 minutes 3 times a week is a good form of exercise.  · Dont take medicines that stimulate the heart. This includes many over-the-counter cold and sinus decongestant pills and sprays, as well as diet pills. Check the warnings about hypertension on the label. Before buying any over-the-counter medicines or supplements, always ask the pharmacist about the product's potential interaction with your high blood pressure and your high blood pressure medicines.  · Stimulants such as amphetamine or cocaine could be deadly for someone with high blood pressure. Never take these.  · Limit how much caffeine you get in your diet. Switch to caffeine-free products.  · Stop smoking. If you are a long-time smoker, this can be hard. Talk to your healthcare provider about medicines and nicotine replacement options to help you. Also, enroll in a stop-smoking program to make it more likely that you will quit for good.  · Learn how to handle stress. This is an important part of any program to lower blood pressure. Learn about relaxation methods like meditation,  yoga, or biofeedback.  · If your provider prescribed medicines, take them exactly as directed. Missing doses may cause your blood pressure get out of control.  · If you miss a dose or doses, check with your healthcare provider or pharmacist about what to do.  · Consider buying an automatic blood pressure machine. Ask your provider for a recommendation. You can get one of these at most pharmacies.     The American Heart Association recommends the following guidelines for home blood pressure monitoring:  · Don't smoke or drink coffee for 30 minutes before taking your blood pressure.  · Go to the bathroom before the test.  · Relax for 5 minutes before taking the measurement.  · Sit with your back supported (don't sit on a couch or soft chair); keep your feet on the floor uncrossed. Place your arm on a solid flat surface (like a table) with the upper part of the arm at heart level. Place the middle of the cuff directly above the eye of the elbow. Check the monitor's instruction manual for an illustration.  · Take multiple readings. When you measure, take 2 to 3 readings one minute apart and record all of the results.  · Take your blood pressure at the same time every day, or as your healthcare provider recommends.  · Record the date, time, and blood pressure reading.  · Take the record with you to your next medical appointment. If your blood pressure monitor has a built-in memory, simply take the monitor with you to your next appointment.  · Call your provider if you have several high readings. Don't be frightened by a single high blood pressure reading, but if you get several high readings, check in with your healthcare provider.  · Note: When blood pressure reaches a systolic (top number) of 180 or higher OR diastolic (bottom number) of 110 or higher, seek emergency medical treatment.  Follow-up care  You will need to see your healthcare provider regularly. This is to check your blood pressure and to make changes to  your medicines. Make a follow-up appointment as directed. Bring the record of your home blood pressure readings to the appointment.  When to seek medical advice  Call your healthcare provider right away if any of these occur:  · Blood pressure reaches a systolic (upper number) of 180 or higher OR a diastolic (bottom number) of 110 or higher  · Chest pain or shortness of breath  · Severe headache  · Throbbing or rushing sound in the ears  · Nosebleed  · Sudden severe pain in your belly (abdomen)  · Extreme drowsiness, confusion, or fainting  · Dizziness or spinning sensation (vertigo)  · Weakness of an arm or leg or one side of the face  · You have problems speaking or seeing   Date Last Reviewed: 12/1/2016  © 7912-1739 [employer]. [address] [place] [address], [place] [address]. All rights reserved. This information is not intended as a substitute for professional medical care. Always follow your healthcare professional's instructions.

## 2020-11-16 ENCOUNTER — HOSPITAL ENCOUNTER (OUTPATIENT)
Dept: RADIOLOGY | Facility: HOSPITAL | Age: 50
Discharge: HOME | End: 2020-11-16
Attending: INTERNAL MEDICINE
Payer: COMMERCIAL

## 2020-11-16 DIAGNOSIS — Z12.31 OTHER SCREENING MAMMOGRAM: ICD-10-CM

## 2020-11-16 PROCEDURE — 77067 SCR MAMMO BI INCL CAD: CPT | Mod: 26,,, | Performed by: RADIOLOGY

## 2020-11-16 PROCEDURE — 77063 BREAST TOMOSYNTHESIS BI: CPT | Mod: 26,,, | Performed by: RADIOLOGY

## 2020-11-16 PROCEDURE — 77067 SCR MAMMO BI INCL CAD: CPT | Mod: TC,PN

## 2020-11-16 PROCEDURE — 77063 MAMMO DIGITAL SCREENING BILAT WITH TOMO: ICD-10-PCS | Mod: 26,,, | Performed by: RADIOLOGY

## 2020-11-16 PROCEDURE — 77067 MAMMO DIGITAL SCREENING BILAT WITH TOMO: ICD-10-PCS | Mod: 26,,, | Performed by: RADIOLOGY

## 2020-12-10 ENCOUNTER — OFFICE VISIT (OUTPATIENT)
Dept: PRIMARY CARE CLINIC | Facility: CLINIC | Age: 50
End: 2020-12-10
Payer: COMMERCIAL

## 2020-12-10 ENCOUNTER — LAB VISIT (OUTPATIENT)
Dept: LAB | Facility: HOSPITAL | Age: 50
End: 2020-12-10
Attending: INTERNAL MEDICINE
Payer: COMMERCIAL

## 2020-12-10 ENCOUNTER — DOCUMENTATION ONLY (OUTPATIENT)
Dept: PRIMARY CARE CLINIC | Facility: CLINIC | Age: 50
End: 2020-12-10

## 2020-12-10 ENCOUNTER — IMMUNIZATION (OUTPATIENT)
Dept: PHARMACY | Facility: CLINIC | Age: 50
End: 2020-12-10
Payer: COMMERCIAL

## 2020-12-10 VITALS
SYSTOLIC BLOOD PRESSURE: 118 MMHG | DIASTOLIC BLOOD PRESSURE: 64 MMHG | HEART RATE: 93 BPM | TEMPERATURE: 98 F | WEIGHT: 207.44 LBS | HEIGHT: 64 IN | RESPIRATION RATE: 17 BRPM | OXYGEN SATURATION: 98 % | BODY MASS INDEX: 35.41 KG/M2

## 2020-12-10 DIAGNOSIS — Z11.4 SCREENING FOR HIV (HUMAN IMMUNODEFICIENCY VIRUS): ICD-10-CM

## 2020-12-10 DIAGNOSIS — Z11.59 NEED FOR HEPATITIS C SCREENING TEST: ICD-10-CM

## 2020-12-10 DIAGNOSIS — J30.89 PERENNIAL ALLERGIC RHINITIS: ICD-10-CM

## 2020-12-10 DIAGNOSIS — E66.01 SEVERE OBESITY (BMI 35.0-39.9) WITH COMORBIDITY: ICD-10-CM

## 2020-12-10 DIAGNOSIS — Z12.11 COLON CANCER SCREENING: ICD-10-CM

## 2020-12-10 DIAGNOSIS — Z23 NEED FOR DIPHTHERIA-TETANUS-PERTUSSIS (TDAP) VACCINE: ICD-10-CM

## 2020-12-10 DIAGNOSIS — I10 ESSENTIAL HYPERTENSION: ICD-10-CM

## 2020-12-10 DIAGNOSIS — Z83.3 FAMILY HISTORY OF TYPE 2 DIABETES MELLITUS IN FATHER: ICD-10-CM

## 2020-12-10 DIAGNOSIS — Z00.00 ROUTINE MEDICAL EXAM: ICD-10-CM

## 2020-12-10 DIAGNOSIS — Z00.00 ROUTINE MEDICAL EXAM: Primary | ICD-10-CM

## 2020-12-10 LAB
25(OH)D3+25(OH)D2 SERPL-MCNC: >155 NG/ML (ref 30–96)
ALBUMIN SERPL BCP-MCNC: 3.4 G/DL (ref 3.5–5.2)
ALP SERPL-CCNC: 95 U/L (ref 55–135)
ALT SERPL W/O P-5'-P-CCNC: 26 U/L (ref 10–44)
ANION GAP SERPL CALC-SCNC: 8 MMOL/L (ref 8–16)
AST SERPL-CCNC: 21 U/L (ref 10–40)
BACTERIA #/AREA URNS AUTO: NORMAL /HPF
BASOPHILS # BLD AUTO: 0.1 K/UL (ref 0–0.2)
BASOPHILS NFR BLD: 1.6 % (ref 0–1.9)
BILIRUB SERPL-MCNC: 0.4 MG/DL (ref 0.1–1)
BILIRUB UR QL STRIP: NEGATIVE
BUN SERPL-MCNC: 13 MG/DL (ref 6–20)
CALCIUM SERPL-MCNC: 9.5 MG/DL (ref 8.7–10.5)
CHLORIDE SERPL-SCNC: 101 MMOL/L (ref 95–110)
CHOLEST SERPL-MCNC: 219 MG/DL (ref 120–199)
CHOLEST/HDLC SERPL: 2.1 {RATIO} (ref 2–5)
CLARITY UR REFRACT.AUTO: ABNORMAL
CO2 SERPL-SCNC: 28 MMOL/L (ref 23–29)
COLOR UR AUTO: YELLOW
CREAT SERPL-MCNC: 0.9 MG/DL (ref 0.5–1.4)
DIFFERENTIAL METHOD: ABNORMAL
EOSINOPHIL # BLD AUTO: 0.1 K/UL (ref 0–0.5)
EOSINOPHIL NFR BLD: 2.2 % (ref 0–8)
ERYTHROCYTE [DISTWIDTH] IN BLOOD BY AUTOMATED COUNT: 14.2 % (ref 11.5–14.5)
EST. GFR  (AFRICAN AMERICAN): >60 ML/MIN/1.73 M^2
EST. GFR  (NON AFRICAN AMERICAN): >60 ML/MIN/1.73 M^2
ESTIMATED AVG GLUCOSE: 108 MG/DL (ref 68–131)
GLUCOSE SERPL-MCNC: 94 MG/DL (ref 70–110)
GLUCOSE UR QL STRIP: NEGATIVE
HBA1C MFR BLD HPLC: 5.4 % (ref 4–5.6)
HCT VFR BLD AUTO: 37.4 % (ref 37–48.5)
HDLC SERPL-MCNC: 104 MG/DL (ref 40–75)
HDLC SERPL: 47.5 % (ref 20–50)
HGB BLD-MCNC: 11.3 G/DL (ref 12–16)
HGB UR QL STRIP: NEGATIVE
IMM GRANULOCYTES # BLD AUTO: 0.01 K/UL (ref 0–0.04)
IMM GRANULOCYTES NFR BLD AUTO: 0.2 % (ref 0–0.5)
KETONES UR QL STRIP: NEGATIVE
LDLC SERPL CALC-MCNC: 96.8 MG/DL (ref 63–159)
LEUKOCYTE ESTERASE UR QL STRIP: NEGATIVE
LYMPHOCYTES # BLD AUTO: 2.2 K/UL (ref 1–4.8)
LYMPHOCYTES NFR BLD: 35.1 % (ref 18–48)
MCH RBC QN AUTO: 26.5 PG (ref 27–31)
MCHC RBC AUTO-ENTMCNC: 30.2 G/DL (ref 32–36)
MCV RBC AUTO: 88 FL (ref 82–98)
MICROSCOPIC COMMENT: NORMAL
MONOCYTES # BLD AUTO: 0.5 K/UL (ref 0.3–1)
MONOCYTES NFR BLD: 7.7 % (ref 4–15)
NEUTROPHILS # BLD AUTO: 3.3 K/UL (ref 1.8–7.7)
NEUTROPHILS NFR BLD: 53.2 % (ref 38–73)
NITRITE UR QL STRIP: NEGATIVE
NONHDLC SERPL-MCNC: 115 MG/DL
NRBC BLD-RTO: 0 /100 WBC
PH UR STRIP: 6 [PH] (ref 5–8)
PLATELET # BLD AUTO: 421 K/UL (ref 150–350)
PMV BLD AUTO: 10.8 FL (ref 9.2–12.9)
POTASSIUM SERPL-SCNC: 4.1 MMOL/L (ref 3.5–5.1)
PROT SERPL-MCNC: 8 G/DL (ref 6–8.4)
PROT UR QL STRIP: NEGATIVE
RBC # BLD AUTO: 4.27 M/UL (ref 4–5.4)
RBC #/AREA URNS AUTO: 0 /HPF (ref 0–4)
SODIUM SERPL-SCNC: 137 MMOL/L (ref 136–145)
SP GR UR STRIP: 1.02 (ref 1–1.03)
SQUAMOUS #/AREA URNS AUTO: 9 /HPF
TRIGL SERPL-MCNC: 91 MG/DL (ref 30–150)
URN SPEC COLLECT METH UR: ABNORMAL
WBC # BLD AUTO: 6.26 K/UL (ref 3.9–12.7)
WBC #/AREA URNS AUTO: 1 /HPF (ref 0–5)

## 2020-12-10 PROCEDURE — 80061 LIPID PANEL: CPT

## 2020-12-10 PROCEDURE — 80053 COMPREHEN METABOLIC PANEL: CPT

## 2020-12-10 PROCEDURE — 3078F DIAST BP <80 MM HG: CPT | Mod: CPTII,S$GLB,, | Performed by: INTERNAL MEDICINE

## 2020-12-10 PROCEDURE — 85025 COMPLETE CBC W/AUTO DIFF WBC: CPT

## 2020-12-10 PROCEDURE — 99999 PR PBB SHADOW E&M-EST. PATIENT-LVL III: ICD-10-PCS | Mod: PBBFAC,,, | Performed by: INTERNAL MEDICINE

## 2020-12-10 PROCEDURE — 3008F BODY MASS INDEX DOCD: CPT | Mod: CPTII,S$GLB,, | Performed by: INTERNAL MEDICINE

## 2020-12-10 PROCEDURE — 1126F AMNT PAIN NOTED NONE PRSNT: CPT | Mod: S$GLB,,, | Performed by: INTERNAL MEDICINE

## 2020-12-10 PROCEDURE — 1126F PR PAIN SEVERITY QUANTIFIED, NO PAIN PRESENT: ICD-10-PCS | Mod: S$GLB,,, | Performed by: INTERNAL MEDICINE

## 2020-12-10 PROCEDURE — 99396 PR PREVENTIVE VISIT,EST,40-64: ICD-10-PCS | Mod: S$GLB,,, | Performed by: INTERNAL MEDICINE

## 2020-12-10 PROCEDURE — 99396 PREV VISIT EST AGE 40-64: CPT | Mod: S$GLB,,, | Performed by: INTERNAL MEDICINE

## 2020-12-10 PROCEDURE — 82306 VITAMIN D 25 HYDROXY: CPT

## 2020-12-10 PROCEDURE — 3074F PR MOST RECENT SYSTOLIC BLOOD PRESSURE < 130 MM HG: ICD-10-PCS | Mod: CPTII,S$GLB,, | Performed by: INTERNAL MEDICINE

## 2020-12-10 PROCEDURE — 86703 HIV-1/HIV-2 1 RESULT ANTBDY: CPT

## 2020-12-10 PROCEDURE — 86803 HEPATITIS C AB TEST: CPT

## 2020-12-10 PROCEDURE — 83036 HEMOGLOBIN GLYCOSYLATED A1C: CPT

## 2020-12-10 PROCEDURE — 36415 COLL VENOUS BLD VENIPUNCTURE: CPT | Mod: PN

## 2020-12-10 PROCEDURE — 3074F SYST BP LT 130 MM HG: CPT | Mod: CPTII,S$GLB,, | Performed by: INTERNAL MEDICINE

## 2020-12-10 PROCEDURE — 3078F PR MOST RECENT DIASTOLIC BLOOD PRESSURE < 80 MM HG: ICD-10-PCS | Mod: CPTII,S$GLB,, | Performed by: INTERNAL MEDICINE

## 2020-12-10 PROCEDURE — 99999 PR PBB SHADOW E&M-EST. PATIENT-LVL III: CPT | Mod: PBBFAC,,, | Performed by: INTERNAL MEDICINE

## 2020-12-10 PROCEDURE — 3008F PR BODY MASS INDEX (BMI) DOCUMENTED: ICD-10-PCS | Mod: CPTII,S$GLB,, | Performed by: INTERNAL MEDICINE

## 2020-12-10 PROCEDURE — 81001 URINALYSIS AUTO W/SCOPE: CPT

## 2020-12-10 RX ORDER — AMLODIPINE BESYLATE 5 MG/1
5 TABLET ORAL DAILY
Qty: 90 TABLET | Refills: 1 | Status: SHIPPED | OUTPATIENT
Start: 2020-12-10 | End: 2021-06-14 | Stop reason: SDUPTHER

## 2020-12-10 RX ORDER — FLUTICASONE PROPIONATE 50 MCG
2 SPRAY, SUSPENSION (ML) NASAL DAILY
Qty: 16 G | Refills: 11 | Status: SHIPPED | OUTPATIENT
Start: 2020-12-10 | End: 2022-01-04

## 2020-12-10 RX ORDER — MINERAL OIL
180 ENEMA (ML) RECTAL DAILY
Qty: 90 TABLET | Refills: 3 | Status: SHIPPED | OUTPATIENT
Start: 2020-12-10

## 2020-12-10 RX ORDER — HYDROCHLOROTHIAZIDE 12.5 MG/1
12.5 CAPSULE ORAL DAILY
Qty: 90 CAPSULE | Refills: 1 | Status: SHIPPED | OUTPATIENT
Start: 2020-12-10 | End: 2021-02-10 | Stop reason: SDUPTHER

## 2020-12-10 RX ORDER — MONTELUKAST SODIUM 10 MG/1
10 TABLET ORAL DAILY
Qty: 90 TABLET | Refills: 1 | Status: SHIPPED | OUTPATIENT
Start: 2020-12-10 | End: 2021-06-14 | Stop reason: SDUPTHER

## 2020-12-10 RX ORDER — AZELASTINE 1 MG/ML
1 SPRAY, METERED NASAL 2 TIMES DAILY PRN
Qty: 30 ML | Refills: 11 | Status: SHIPPED | OUTPATIENT
Start: 2020-12-10 | End: 2022-04-28 | Stop reason: SDUPTHER

## 2020-12-11 LAB
HCV AB SERPL QL IA: NEGATIVE
HIV 1+2 AB+HIV1 P24 AG SERPL QL IA: NEGATIVE

## 2020-12-11 NOTE — PROGRESS NOTES
Subjective:       Patient ID: Gisella Bennett is a 50 y.o. female.    Chief Complaint: Establish Care and Annual Exam    Last seen by previous PCP 21 months ago. Presents for transfer of care and annual physical. Reports history of hypertension diagnosed two years ago with elevated blood pressure after a prolonged course of steroids for uveitis. Had been controlled on HCTZ, but began to rise weeks ago. She presented urgently to a nurse practitioner one month ago and Amlodipine was added. Notes pressure has improved, ranging 120's/80's at home now.     PMH: .  Hypertension.   Seasonal Allergic Rhinitis.  Uveitis.  AMALIA positive.   Obesity.  GERD - subsided.   Vitamin D insufficiency.     Past Surgical History:  BUNIONECTOMY; Left  2013: SPINAL FUSION      Comment:  Lumbar Spinal fusion  IUD has been removed.     Pap normal . Mammogram normal . Eye exam . iFOBT negative . No Colonoscopy. Has declined Flu shots. No Tdap or Shingles vaccine.     Social: No tobacco, occasional alcohol.    FMH: DM, Heart dis and Kidney dis in father. Thyroid dis in mother. Breast cancer in maternal aunt. Sarcoidosis in cousin.     Allergies: Codeine, avocado, tree nuts.     Medications: Amlodipine 5mg daily, HCTZ 12.5mg daily, Astelin, Flonase, Allegra, Singulair, Epipen prn, contraceptive patch, Vitamin D 50,000 twice a week, Multivitamin, Vit C, Magnesium.      Review of Systems   Constitutional: Negative for activity change, appetite change, fatigue, fever and unexpected weight change.   HENT: Negative for nasal congestion, ear pain, hearing loss, rhinorrhea, sneezing, sore throat, trouble swallowing and voice change.    Eyes: Negative for pain and visual disturbance.   Respiratory: Negative for cough, chest tightness, shortness of breath and wheezing.    Cardiovascular: Negative for chest pain, palpitations and leg swelling.   Gastrointestinal: Negative for abdominal pain, blood in stool, constipation,  "diarrhea, nausea and vomiting.   Genitourinary: Negative for dysuria, frequency, menstrual problem and pelvic pain.        LMP 12/4/20.   Musculoskeletal: Negative for arthralgias, gait problem, joint swelling and myalgias.   Integumentary:  Negative for color change and rash.   Neurological: Negative for dizziness, syncope, facial asymmetry, speech difficulty, weakness, numbness and headaches.   Hematological: Negative for adenopathy. Does not bruise/bleed easily.   Psychiatric/Behavioral: Negative for dysphoric mood and sleep disturbance. The patient is not nervous/anxious.          Objective:    /64, Pulse 93, Temp 98, O2 Sat 98%, Ht 5' 4", Wt 207 lbs, BMI=35.6  Physical Exam  Vitals signs reviewed.   Constitutional:       General: She is not in acute distress.     Appearance: She is well-developed. She is obese. She is not diaphoretic.   HENT:      Head: Normocephalic and atraumatic.      Right Ear: Tympanic membrane and ear canal normal.      Left Ear: Tympanic membrane and ear canal normal.      Nose: Nose normal. No congestion.   Eyes:      General: No scleral icterus.     Extraocular Movements: Extraocular movements intact.      Conjunctiva/sclera: Conjunctivae normal.      Right eye: Right conjunctiva is not injected.      Left eye: Left conjunctiva is not injected.      Pupils: Pupils are equal, round, and reactive to light.   Neck:      Musculoskeletal: Normal range of motion and neck supple.      Thyroid: No thyromegaly.      Vascular: No carotid bruit or JVD.   Cardiovascular:      Rate and Rhythm: Normal rate and regular rhythm.      Pulses: Normal pulses.      Heart sounds: Normal heart sounds. No murmur. No friction rub. No gallop.    Pulmonary:      Effort: Pulmonary effort is normal. No respiratory distress.      Breath sounds: Normal breath sounds. No wheezing, rhonchi or rales.   Abdominal:      General: Bowel sounds are normal. There is no distension.      Palpations: Abdomen is soft. " There is no mass.      Tenderness: There is no abdominal tenderness.      Hernia: No hernia is present.   Musculoskeletal: Normal range of motion.         General: No tenderness or deformity.      Right lower leg: No edema.      Left lower leg: No edema.   Lymphadenopathy:      Cervical: No cervical adenopathy.   Skin:     General: Skin is warm and dry.      Coloration: Skin is not pale.      Findings: No erythema or rash.      Nails: There is no clubbing.     Neurological:      General: No focal deficit present.      Mental Status: She is alert and oriented to person, place, and time.      Cranial Nerves: No cranial nerve deficit.      Motor: No abnormal muscle tone.      Coordination: Coordination normal.      Gait: Gait normal.      Deep Tendon Reflexes: Reflexes are normal and symmetric.   Psychiatric:         Mood and Affect: Mood normal.         Behavior: Behavior normal.         Thought Content: Thought content normal.         Judgment: Judgment normal.         Assessment:       1. Routine medical exam    2. Essential hypertension    3. Severe obesity (BMI 35.0-39.9) with comorbidity    4. Family history of type 2 diabetes mellitus in father    5. Perennial allergic rhinitis    6. Colon cancer screening    7. Need for hepatitis C screening test    8. Screening for HIV (human immunodeficiency virus)    9. Need for diphtheria-tetanus-pertussis (Tdap) vaccine        Plan:       Routine medical exam  -     CBC Auto Differential; Future; Expected date: 12/10/2020  -     Comprehensive Metabolic Panel; Future; Expected date: 12/10/2020  -     Lipid Panel; Future; Expected date: 12/10/2020  -     Vitamin D; Future; Expected date: 12/10/2020  -     Urinalysis    Essential hypertension controlled  -     hydroCHLOROthiazide (MICROZIDE) 12.5 mg capsule; Take 1 capsule (12.5 mg total) by mouth once daily.  Dispense: 90 capsule; Refill: 1  -     amLODIPine (NORVASC) 5 MG tablet; Take 1 tablet (5 mg total) by mouth once  daily.  Dispense: 90 tablet; Refill: 1    Severe obesity (BMI 35.0-39.9) with comorbidity        -     Continue diet and exercise for weight reduction and cardiovascular health.     Family history of type 2 diabetes mellitus in father  -     Hemoglobin A1C; Future; Expected date: 12/10/2020    Perennial allergic rhinitis  -     montelukast (SINGULAIR) 10 mg tablet; Take 1 tablet (10 mg total) by mouth once daily.  Dispense: 90 tablet; Refill: 1  -     fluticasone propionate (FLONASE) 50 mcg/actuation nasal spray; 2 sprays (100 mcg total) by Each Nostril route once daily.  Dispense: 16 g; Refill: 11  -     azelastine (ASTELIN) 137 mcg (0.1 %) nasal spray; 1 spray (137 mcg total) by Nasal route 2 (two) times daily as needed for Rhinitis.  Dispense: 30 mL; Refill: 11  -     fexofenadine (ALLEGRA) 180 MG tablet; Take 1 tablet (180 mg total) by mouth once daily.  Dispense: 90 tablet; Refill: 3    Colon cancer screening  -     Fecal Immunochemical Test (iFOBT); Future; Expected date: 12/10/2020    Need for hepatitis C screening test  -     Hepatitis C Antibody; Future; Expected date: 12/10/2020    Screening for HIV (human immunodeficiency virus)  -     HIV 1/2 Ag/Ab (4th Gen); Future; Expected date: 12/10/2020    Need for diphtheria-tetanus-pertussis (Tdap) vaccine - Tdap today.     She will consider Shingrix.

## 2021-01-03 ENCOUNTER — PATIENT MESSAGE (OUTPATIENT)
Dept: PRIMARY CARE CLINIC | Facility: CLINIC | Age: 51
End: 2021-01-03

## 2021-01-04 ENCOUNTER — PATIENT MESSAGE (OUTPATIENT)
Dept: ADMINISTRATIVE | Facility: HOSPITAL | Age: 51
End: 2021-01-04

## 2021-01-06 RX ORDER — NORELGESTROMIN AND ETHINYL ESTRADIOL 35; 150 UG/MG; UG/MG
1 PATCH TRANSDERMAL
Qty: 4 PATCH | Refills: 2 | Status: SHIPPED | OUTPATIENT
Start: 2021-01-06 | End: 2021-03-17

## 2021-01-16 ENCOUNTER — LAB VISIT (OUTPATIENT)
Dept: LAB | Facility: HOSPITAL | Age: 51
End: 2021-01-16
Attending: INTERNAL MEDICINE
Payer: COMMERCIAL

## 2021-01-16 DIAGNOSIS — Z12.11 COLON CANCER SCREENING: ICD-10-CM

## 2021-01-16 PROCEDURE — 82274 ASSAY TEST FOR BLOOD FECAL: CPT

## 2021-01-19 LAB — HEMOCCULT STL QL IA: NEGATIVE

## 2021-02-10 DIAGNOSIS — I10 ESSENTIAL HYPERTENSION: ICD-10-CM

## 2021-02-11 RX ORDER — HYDROCHLOROTHIAZIDE 12.5 MG/1
12.5 CAPSULE ORAL DAILY
Qty: 90 CAPSULE | Refills: 1 | Status: SHIPPED | OUTPATIENT
Start: 2021-02-11 | End: 2021-06-14 | Stop reason: SDUPTHER

## 2021-03-11 ENCOUNTER — OFFICE VISIT (OUTPATIENT)
Dept: PRIMARY CARE CLINIC | Facility: CLINIC | Age: 51
End: 2021-03-11
Payer: COMMERCIAL

## 2021-03-11 VITALS
TEMPERATURE: 98 F | HEART RATE: 109 BPM | SYSTOLIC BLOOD PRESSURE: 116 MMHG | HEIGHT: 64 IN | DIASTOLIC BLOOD PRESSURE: 82 MMHG | BODY MASS INDEX: 35.68 KG/M2 | OXYGEN SATURATION: 99 % | WEIGHT: 209 LBS

## 2021-03-11 DIAGNOSIS — R10.11 RIGHT UPPER QUADRANT ABDOMINAL PAIN: Primary | ICD-10-CM

## 2021-03-11 DIAGNOSIS — E66.01 CLASS 2 SEVERE OBESITY DUE TO EXCESS CALORIES WITH SERIOUS COMORBIDITY AND BODY MASS INDEX (BMI) OF 35.0 TO 35.9 IN ADULT: ICD-10-CM

## 2021-03-11 PROCEDURE — 3079F DIAST BP 80-89 MM HG: CPT | Mod: CPTII,S$GLB,, | Performed by: FAMILY MEDICINE

## 2021-03-11 PROCEDURE — 3008F BODY MASS INDEX DOCD: CPT | Mod: CPTII,S$GLB,, | Performed by: FAMILY MEDICINE

## 2021-03-11 PROCEDURE — 3074F SYST BP LT 130 MM HG: CPT | Mod: CPTII,S$GLB,, | Performed by: FAMILY MEDICINE

## 2021-03-11 PROCEDURE — 99213 OFFICE O/P EST LOW 20 MIN: CPT | Mod: S$GLB,,, | Performed by: FAMILY MEDICINE

## 2021-03-11 PROCEDURE — 3074F PR MOST RECENT SYSTOLIC BLOOD PRESSURE < 130 MM HG: ICD-10-PCS | Mod: CPTII,S$GLB,, | Performed by: FAMILY MEDICINE

## 2021-03-11 PROCEDURE — 99213 PR OFFICE/OUTPT VISIT, EST, LEVL III, 20-29 MIN: ICD-10-PCS | Mod: S$GLB,,, | Performed by: FAMILY MEDICINE

## 2021-03-11 PROCEDURE — 99999 PR PBB SHADOW E&M-EST. PATIENT-LVL V: CPT | Mod: PBBFAC,,, | Performed by: FAMILY MEDICINE

## 2021-03-11 PROCEDURE — 1125F AMNT PAIN NOTED PAIN PRSNT: CPT | Mod: S$GLB,,, | Performed by: FAMILY MEDICINE

## 2021-03-11 PROCEDURE — 3079F PR MOST RECENT DIASTOLIC BLOOD PRESSURE 80-89 MM HG: ICD-10-PCS | Mod: CPTII,S$GLB,, | Performed by: FAMILY MEDICINE

## 2021-03-11 PROCEDURE — 99999 PR PBB SHADOW E&M-EST. PATIENT-LVL V: ICD-10-PCS | Mod: PBBFAC,,, | Performed by: FAMILY MEDICINE

## 2021-03-11 PROCEDURE — 3008F PR BODY MASS INDEX (BMI) DOCUMENTED: ICD-10-PCS | Mod: CPTII,S$GLB,, | Performed by: FAMILY MEDICINE

## 2021-03-11 PROCEDURE — 1125F PR PAIN SEVERITY QUANTIFIED, PAIN PRESENT: ICD-10-PCS | Mod: S$GLB,,, | Performed by: FAMILY MEDICINE

## 2021-03-11 RX ORDER — KETOROLAC TROMETHAMINE 10 MG/1
10 TABLET, FILM COATED ORAL 3 TIMES DAILY PRN
Qty: 30 TABLET | Refills: 0 | Status: SHIPPED | OUTPATIENT
Start: 2021-03-11 | End: 2021-06-14

## 2021-03-13 ENCOUNTER — IMMUNIZATION (OUTPATIENT)
Dept: PRIMARY CARE CLINIC | Facility: CLINIC | Age: 51
End: 2021-03-13
Payer: COMMERCIAL

## 2021-03-13 DIAGNOSIS — Z23 NEED FOR VACCINATION: Primary | ICD-10-CM

## 2021-03-13 PROCEDURE — 0001A PR IMMUNIZ ADMIN, SARS-COV-2 COVID-19 VACC, 30MCG/0.3ML, 1ST DOSE: ICD-10-PCS | Mod: CV19,S$GLB,, | Performed by: INTERNAL MEDICINE

## 2021-03-13 PROCEDURE — 91300 PR SARS-COV- 2 COVID-19 VACCINE, NO PRSV, 30MCG/0.3ML, IM: ICD-10-PCS | Mod: S$GLB,,, | Performed by: INTERNAL MEDICINE

## 2021-03-13 PROCEDURE — 0001A PR IMMUNIZ ADMIN, SARS-COV-2 COVID-19 VACC, 30MCG/0.3ML, 1ST DOSE: CPT | Mod: CV19,S$GLB,, | Performed by: INTERNAL MEDICINE

## 2021-03-13 PROCEDURE — 91300 PR SARS-COV- 2 COVID-19 VACCINE, NO PRSV, 30MCG/0.3ML, IM: CPT | Mod: S$GLB,,, | Performed by: INTERNAL MEDICINE

## 2021-03-13 RX ADMIN — Medication 0.3 ML: at 02:03

## 2021-03-16 ENCOUNTER — PATIENT OUTREACH (OUTPATIENT)
Dept: ADMINISTRATIVE | Facility: OTHER | Age: 51
End: 2021-03-16

## 2021-03-17 ENCOUNTER — HOSPITAL ENCOUNTER (OUTPATIENT)
Dept: RADIOLOGY | Facility: OTHER | Age: 51
Discharge: HOME | End: 2021-03-17
Attending: FAMILY MEDICINE
Payer: COMMERCIAL

## 2021-03-17 ENCOUNTER — TELEPHONE (OUTPATIENT)
Dept: PRIMARY CARE CLINIC | Facility: CLINIC | Age: 51
End: 2021-03-17

## 2021-03-17 ENCOUNTER — OFFICE VISIT (OUTPATIENT)
Dept: OBSTETRICS AND GYNECOLOGY | Facility: CLINIC | Age: 51
End: 2021-03-17
Payer: COMMERCIAL

## 2021-03-17 VITALS
DIASTOLIC BLOOD PRESSURE: 86 MMHG | WEIGHT: 208.31 LBS | BODY MASS INDEX: 35.56 KG/M2 | SYSTOLIC BLOOD PRESSURE: 122 MMHG | HEIGHT: 64 IN

## 2021-03-17 DIAGNOSIS — K80.20 CALCULUS OF GALLBLADDER WITHOUT CHOLECYSTITIS WITHOUT OBSTRUCTION: Primary | ICD-10-CM

## 2021-03-17 DIAGNOSIS — N89.8 VAGINAL DRYNESS: ICD-10-CM

## 2021-03-17 DIAGNOSIS — N95.1 MENOPAUSAL SYMPTOM: Primary | ICD-10-CM

## 2021-03-17 DIAGNOSIS — R10.11 RIGHT UPPER QUADRANT ABDOMINAL PAIN: ICD-10-CM

## 2021-03-17 PROCEDURE — 99999 PR PBB SHADOW E&M-EST. PATIENT-LVL III: ICD-10-PCS | Mod: PBBFAC,,, | Performed by: STUDENT IN AN ORGANIZED HEALTH CARE EDUCATION/TRAINING PROGRAM

## 2021-03-17 PROCEDURE — 99999 PR PBB SHADOW E&M-EST. PATIENT-LVL III: CPT | Mod: PBBFAC,,, | Performed by: STUDENT IN AN ORGANIZED HEALTH CARE EDUCATION/TRAINING PROGRAM

## 2021-03-17 PROCEDURE — 99214 PR OFFICE/OUTPT VISIT, EST, LEVL IV, 30-39 MIN: ICD-10-PCS | Mod: S$GLB,,, | Performed by: STUDENT IN AN ORGANIZED HEALTH CARE EDUCATION/TRAINING PROGRAM

## 2021-03-17 PROCEDURE — 76705 ECHO EXAM OF ABDOMEN: CPT | Mod: 26,,, | Performed by: RADIOLOGY

## 2021-03-17 PROCEDURE — 3074F PR MOST RECENT SYSTOLIC BLOOD PRESSURE < 130 MM HG: ICD-10-PCS | Mod: CPTII,S$GLB,, | Performed by: STUDENT IN AN ORGANIZED HEALTH CARE EDUCATION/TRAINING PROGRAM

## 2021-03-17 PROCEDURE — 76705 ECHO EXAM OF ABDOMEN: CPT | Mod: TC

## 2021-03-17 PROCEDURE — 3079F PR MOST RECENT DIASTOLIC BLOOD PRESSURE 80-89 MM HG: ICD-10-PCS | Mod: CPTII,S$GLB,, | Performed by: STUDENT IN AN ORGANIZED HEALTH CARE EDUCATION/TRAINING PROGRAM

## 2021-03-17 PROCEDURE — 76705 US ABDOMEN LIMITED: ICD-10-PCS | Mod: 26,,, | Performed by: RADIOLOGY

## 2021-03-17 PROCEDURE — 3008F BODY MASS INDEX DOCD: CPT | Mod: CPTII,S$GLB,, | Performed by: STUDENT IN AN ORGANIZED HEALTH CARE EDUCATION/TRAINING PROGRAM

## 2021-03-17 PROCEDURE — 3079F DIAST BP 80-89 MM HG: CPT | Mod: CPTII,S$GLB,, | Performed by: STUDENT IN AN ORGANIZED HEALTH CARE EDUCATION/TRAINING PROGRAM

## 2021-03-17 PROCEDURE — 3008F PR BODY MASS INDEX (BMI) DOCUMENTED: ICD-10-PCS | Mod: CPTII,S$GLB,, | Performed by: STUDENT IN AN ORGANIZED HEALTH CARE EDUCATION/TRAINING PROGRAM

## 2021-03-17 PROCEDURE — 99214 OFFICE O/P EST MOD 30 MIN: CPT | Mod: S$GLB,,, | Performed by: STUDENT IN AN ORGANIZED HEALTH CARE EDUCATION/TRAINING PROGRAM

## 2021-03-17 PROCEDURE — 3074F SYST BP LT 130 MM HG: CPT | Mod: CPTII,S$GLB,, | Performed by: STUDENT IN AN ORGANIZED HEALTH CARE EDUCATION/TRAINING PROGRAM

## 2021-03-17 RX ORDER — ESTRADIOL 0.1 MG/G
1 CREAM VAGINAL DAILY
Qty: 42.5 G | Refills: 3 | Status: SHIPPED | OUTPATIENT
Start: 2021-03-17 | End: 2021-09-24

## 2021-03-18 ENCOUNTER — TELEPHONE (OUTPATIENT)
Dept: PRIMARY CARE CLINIC | Facility: CLINIC | Age: 51
End: 2021-03-18

## 2021-03-18 ENCOUNTER — PATIENT MESSAGE (OUTPATIENT)
Dept: PRIMARY CARE CLINIC | Facility: CLINIC | Age: 51
End: 2021-03-18

## 2021-03-24 ENCOUNTER — OFFICE VISIT (OUTPATIENT)
Dept: SURGERY | Facility: CLINIC | Age: 51
End: 2021-03-24
Payer: COMMERCIAL

## 2021-03-24 VITALS
WEIGHT: 208.44 LBS | BODY MASS INDEX: 35.59 KG/M2 | SYSTOLIC BLOOD PRESSURE: 133 MMHG | HEIGHT: 64 IN | HEART RATE: 89 BPM | DIASTOLIC BLOOD PRESSURE: 85 MMHG

## 2021-03-24 DIAGNOSIS — E66.01 SEVERE OBESITY (BMI 35.0-39.9) WITH COMORBIDITY: ICD-10-CM

## 2021-03-24 DIAGNOSIS — R76.8 ANA POSITIVE: ICD-10-CM

## 2021-03-24 DIAGNOSIS — K80.20 CALCULUS OF GALLBLADDER WITHOUT CHOLECYSTITIS WITHOUT OBSTRUCTION: Primary | ICD-10-CM

## 2021-03-24 DIAGNOSIS — I10 ESSENTIAL HYPERTENSION: ICD-10-CM

## 2021-03-24 DIAGNOSIS — J30.2 SEASONAL ALLERGIC RHINITIS, UNSPECIFIED TRIGGER: ICD-10-CM

## 2021-03-24 DIAGNOSIS — E55.9 VITAMIN D DEFICIENCY DISEASE: ICD-10-CM

## 2021-03-24 PROCEDURE — 99999 PR PBB SHADOW E&M-EST. PATIENT-LVL IV: ICD-10-PCS | Mod: PBBFAC,,, | Performed by: SURGERY

## 2021-03-24 PROCEDURE — 3075F SYST BP GE 130 - 139MM HG: CPT | Mod: CPTII,S$GLB,, | Performed by: SURGERY

## 2021-03-24 PROCEDURE — 3079F PR MOST RECENT DIASTOLIC BLOOD PRESSURE 80-89 MM HG: ICD-10-PCS | Mod: CPTII,S$GLB,, | Performed by: SURGERY

## 2021-03-24 PROCEDURE — 99204 OFFICE O/P NEW MOD 45 MIN: CPT | Mod: S$GLB,,, | Performed by: SURGERY

## 2021-03-24 PROCEDURE — 99999 PR PBB SHADOW E&M-EST. PATIENT-LVL IV: CPT | Mod: PBBFAC,,, | Performed by: SURGERY

## 2021-03-24 PROCEDURE — 3075F PR MOST RECENT SYSTOLIC BLOOD PRESS GE 130-139MM HG: ICD-10-PCS | Mod: CPTII,S$GLB,, | Performed by: SURGERY

## 2021-03-24 PROCEDURE — 3079F DIAST BP 80-89 MM HG: CPT | Mod: CPTII,S$GLB,, | Performed by: SURGERY

## 2021-03-24 PROCEDURE — 3008F PR BODY MASS INDEX (BMI) DOCUMENTED: ICD-10-PCS | Mod: CPTII,S$GLB,, | Performed by: SURGERY

## 2021-03-24 PROCEDURE — 1126F PR PAIN SEVERITY QUANTIFIED, NO PAIN PRESENT: ICD-10-PCS | Mod: S$GLB,,, | Performed by: SURGERY

## 2021-03-24 PROCEDURE — 3008F BODY MASS INDEX DOCD: CPT | Mod: CPTII,S$GLB,, | Performed by: SURGERY

## 2021-03-24 PROCEDURE — 1126F AMNT PAIN NOTED NONE PRSNT: CPT | Mod: S$GLB,,, | Performed by: SURGERY

## 2021-03-24 PROCEDURE — 99204 PR OFFICE/OUTPT VISIT, NEW, LEVL IV, 45-59 MIN: ICD-10-PCS | Mod: S$GLB,,, | Performed by: SURGERY

## 2021-04-01 ENCOUNTER — TELEPHONE (OUTPATIENT)
Dept: SURGERY | Facility: CLINIC | Age: 51
End: 2021-04-01

## 2021-04-03 ENCOUNTER — IMMUNIZATION (OUTPATIENT)
Dept: PRIMARY CARE CLINIC | Facility: CLINIC | Age: 51
End: 2021-04-03
Payer: COMMERCIAL

## 2021-04-03 DIAGNOSIS — Z23 NEED FOR VACCINATION: Primary | ICD-10-CM

## 2021-04-03 PROCEDURE — 0002A PR IMMUNIZ ADMIN, SARS-COV-2 COVID-19 VACC, 30MCG/0.3ML, 2ND DOSE: ICD-10-PCS | Mod: CV19,S$GLB,, | Performed by: INTERNAL MEDICINE

## 2021-04-03 PROCEDURE — 91300 PR SARS-COV- 2 COVID-19 VACCINE, NO PRSV, 30MCG/0.3ML, IM: ICD-10-PCS | Mod: S$GLB,,, | Performed by: INTERNAL MEDICINE

## 2021-04-03 PROCEDURE — 0002A PR IMMUNIZ ADMIN, SARS-COV-2 COVID-19 VACC, 30MCG/0.3ML, 2ND DOSE: CPT | Mod: CV19,S$GLB,, | Performed by: INTERNAL MEDICINE

## 2021-04-03 PROCEDURE — 91300 PR SARS-COV- 2 COVID-19 VACCINE, NO PRSV, 30MCG/0.3ML, IM: CPT | Mod: S$GLB,,, | Performed by: INTERNAL MEDICINE

## 2021-04-03 RX ADMIN — Medication 0.3 ML: at 11:04

## 2021-04-05 ENCOUNTER — TELEPHONE (OUTPATIENT)
Dept: SURGERY | Facility: CLINIC | Age: 51
End: 2021-04-05

## 2021-04-05 DIAGNOSIS — K80.20 CALCULUS OF GALLBLADDER WITHOUT CHOLECYSTITIS WITHOUT OBSTRUCTION: Primary | ICD-10-CM

## 2021-04-27 ENCOUNTER — LAB VISIT (OUTPATIENT)
Dept: LAB | Facility: HOSPITAL | Age: 51
End: 2021-04-27
Attending: SURGERY
Payer: COMMERCIAL

## 2021-04-27 ENCOUNTER — HOSPITAL ENCOUNTER (OUTPATIENT)
Dept: CARDIOLOGY | Facility: CLINIC | Age: 51
Discharge: HOME | End: 2021-04-27
Payer: COMMERCIAL

## 2021-04-27 DIAGNOSIS — K80.20 CALCULUS OF GALLBLADDER WITHOUT CHOLECYSTITIS WITHOUT OBSTRUCTION: ICD-10-CM

## 2021-04-27 LAB
ANION GAP SERPL CALC-SCNC: 10 MMOL/L (ref 8–16)
BASOPHILS # BLD AUTO: 0.1 K/UL (ref 0–0.2)
BASOPHILS NFR BLD: 1.4 % (ref 0–1.9)
BUN SERPL-MCNC: 10 MG/DL (ref 6–20)
CALCIUM SERPL-MCNC: 9.6 MG/DL (ref 8.7–10.5)
CHLORIDE SERPL-SCNC: 104 MMOL/L (ref 95–110)
CO2 SERPL-SCNC: 26 MMOL/L (ref 23–29)
CREAT SERPL-MCNC: 0.9 MG/DL (ref 0.5–1.4)
DIFFERENTIAL METHOD: ABNORMAL
EOSINOPHIL # BLD AUTO: 0.1 K/UL (ref 0–0.5)
EOSINOPHIL NFR BLD: 1.9 % (ref 0–8)
ERYTHROCYTE [DISTWIDTH] IN BLOOD BY AUTOMATED COUNT: 15.8 % (ref 11.5–14.5)
EST. GFR  (AFRICAN AMERICAN): >60 ML/MIN/1.73 M^2
EST. GFR  (NON AFRICAN AMERICAN): >60 ML/MIN/1.73 M^2
GLUCOSE SERPL-MCNC: 83 MG/DL (ref 70–110)
HCT VFR BLD AUTO: 37.1 % (ref 37–48.5)
HGB BLD-MCNC: 11.8 G/DL (ref 12–16)
IMM GRANULOCYTES # BLD AUTO: 0.02 K/UL (ref 0–0.04)
IMM GRANULOCYTES NFR BLD AUTO: 0.3 % (ref 0–0.5)
LYMPHOCYTES # BLD AUTO: 2.4 K/UL (ref 1–4.8)
LYMPHOCYTES NFR BLD: 34.8 % (ref 18–48)
MCH RBC QN AUTO: 27.3 PG (ref 27–31)
MCHC RBC AUTO-ENTMCNC: 31.8 G/DL (ref 32–36)
MCV RBC AUTO: 86 FL (ref 82–98)
MONOCYTES # BLD AUTO: 0.6 K/UL (ref 0.3–1)
MONOCYTES NFR BLD: 7.9 % (ref 4–15)
NEUTROPHILS # BLD AUTO: 3.7 K/UL (ref 1.8–7.7)
NEUTROPHILS NFR BLD: 53.7 % (ref 38–73)
NRBC BLD-RTO: 0 /100 WBC
PLATELET # BLD AUTO: 367 K/UL (ref 150–450)
PMV BLD AUTO: 10.4 FL (ref 9.2–12.9)
POTASSIUM SERPL-SCNC: 4.3 MMOL/L (ref 3.5–5.1)
RBC # BLD AUTO: 4.32 M/UL (ref 4–5.4)
SODIUM SERPL-SCNC: 140 MMOL/L (ref 136–145)
WBC # BLD AUTO: 6.93 K/UL (ref 3.9–12.7)

## 2021-04-27 PROCEDURE — 93010 ELECTROCARDIOGRAM REPORT: CPT | Mod: S$GLB,,, | Performed by: INTERNAL MEDICINE

## 2021-04-27 PROCEDURE — 85025 COMPLETE CBC W/AUTO DIFF WBC: CPT | Performed by: SURGERY

## 2021-04-27 PROCEDURE — 93005 EKG 12-LEAD: ICD-10-PCS | Mod: S$GLB,,, | Performed by: SURGERY

## 2021-04-27 PROCEDURE — 36415 COLL VENOUS BLD VENIPUNCTURE: CPT | Performed by: SURGERY

## 2021-04-27 PROCEDURE — 93005 ELECTROCARDIOGRAM TRACING: CPT | Mod: S$GLB,,, | Performed by: SURGERY

## 2021-04-27 PROCEDURE — 80048 BASIC METABOLIC PNL TOTAL CA: CPT | Performed by: SURGERY

## 2021-04-27 PROCEDURE — 93010 EKG 12-LEAD: ICD-10-PCS | Mod: S$GLB,,, | Performed by: INTERNAL MEDICINE

## 2021-05-07 ENCOUNTER — PATIENT MESSAGE (OUTPATIENT)
Dept: SURGERY | Facility: CLINIC | Age: 51
End: 2021-05-07

## 2021-05-09 RX ORDER — OXYCODONE HYDROCHLORIDE 5 MG/1
5 TABLET ORAL EVERY 6 HOURS PRN
Qty: 20 TABLET | Refills: 0 | Status: SHIPPED | OUTPATIENT
Start: 2021-05-09 | End: 2021-06-14

## 2021-05-10 ENCOUNTER — LAB VISIT (OUTPATIENT)
Dept: INTERNAL MEDICINE | Facility: CLINIC | Age: 51
End: 2021-05-10
Payer: COMMERCIAL

## 2021-05-10 DIAGNOSIS — K80.20 CALCULUS OF GALLBLADDER WITHOUT CHOLECYSTITIS WITHOUT OBSTRUCTION: ICD-10-CM

## 2021-05-10 PROCEDURE — U0005 INFEC AGEN DETEC AMPLI PROBE: HCPCS | Performed by: SURGERY

## 2021-05-10 PROCEDURE — U0003 INFECTIOUS AGENT DETECTION BY NUCLEIC ACID (DNA OR RNA); SEVERE ACUTE RESPIRATORY SYNDROME CORONAVIRUS 2 (SARS-COV-2) (CORONAVIRUS DISEASE [COVID-19]), AMPLIFIED PROBE TECHNIQUE, MAKING USE OF HIGH THROUGHPUT TECHNOLOGIES AS DESCRIBED BY CMS-2020-01-R: HCPCS | Performed by: SURGERY

## 2021-05-11 LAB — SARS-COV-2 RNA RESP QL NAA+PROBE: NOT DETECTED

## 2021-05-12 ENCOUNTER — TELEPHONE (OUTPATIENT)
Dept: SURGERY | Facility: CLINIC | Age: 51
End: 2021-05-12

## 2021-05-13 ENCOUNTER — HOSPITAL ENCOUNTER (OUTPATIENT)
Facility: HOSPITAL | Age: 51
Discharge: HOME | End: 2021-05-13
Attending: SURGERY | Admitting: SURGERY
Payer: COMMERCIAL

## 2021-05-13 ENCOUNTER — ANESTHESIA EVENT (OUTPATIENT)
Dept: SURGERY | Facility: HOSPITAL | Age: 51
End: 2021-05-13
Payer: COMMERCIAL

## 2021-05-13 ENCOUNTER — ANESTHESIA (OUTPATIENT)
Dept: SURGERY | Facility: HOSPITAL | Age: 51
End: 2021-05-13
Payer: COMMERCIAL

## 2021-05-13 VITALS
SYSTOLIC BLOOD PRESSURE: 122 MMHG | HEART RATE: 78 BPM | OXYGEN SATURATION: 100 % | HEIGHT: 64 IN | BODY MASS INDEX: 35.51 KG/M2 | DIASTOLIC BLOOD PRESSURE: 86 MMHG | RESPIRATION RATE: 18 BRPM | TEMPERATURE: 99 F | WEIGHT: 208 LBS

## 2021-05-13 DIAGNOSIS — K80.20 CHOLELITHIASES: Primary | ICD-10-CM

## 2021-05-13 LAB
B-HCG UR QL: NEGATIVE
CTP QC/QA: YES

## 2021-05-13 PROCEDURE — 25000003 PHARM REV CODE 250: Performed by: NURSE ANESTHETIST, CERTIFIED REGISTERED

## 2021-05-13 PROCEDURE — 63600175 PHARM REV CODE 636 W HCPCS: Performed by: ANESTHESIOLOGY

## 2021-05-13 PROCEDURE — C1887 CATHETER, GUIDING: HCPCS | Performed by: SURGERY

## 2021-05-13 PROCEDURE — 25000003 PHARM REV CODE 250: Performed by: ANESTHESIOLOGY

## 2021-05-13 PROCEDURE — D9220A PRA ANESTHESIA: ICD-10-PCS | Mod: CRNA,,, | Performed by: NURSE ANESTHETIST, CERTIFIED REGISTERED

## 2021-05-13 PROCEDURE — 71000015 HC POSTOP RECOV 1ST HR: Performed by: SURGERY

## 2021-05-13 PROCEDURE — 37000008 HC ANESTHESIA 1ST 15 MINUTES: Performed by: SURGERY

## 2021-05-13 PROCEDURE — 47562 PR LAP,CHOLECYSTECTOMY: ICD-10-PCS | Mod: ,,, | Performed by: SURGERY

## 2021-05-13 PROCEDURE — 63600175 PHARM REV CODE 636 W HCPCS: Performed by: NURSE ANESTHETIST, CERTIFIED REGISTERED

## 2021-05-13 PROCEDURE — 63600175 PHARM REV CODE 636 W HCPCS: Performed by: STUDENT IN AN ORGANIZED HEALTH CARE EDUCATION/TRAINING PROGRAM

## 2021-05-13 PROCEDURE — 25000003 PHARM REV CODE 250: Performed by: STUDENT IN AN ORGANIZED HEALTH CARE EDUCATION/TRAINING PROGRAM

## 2021-05-13 PROCEDURE — D9220A PRA ANESTHESIA: ICD-10-PCS | Mod: ANES,,, | Performed by: ANESTHESIOLOGY

## 2021-05-13 PROCEDURE — 88304 TISSUE EXAM BY PATHOLOGIST: CPT | Mod: 26,,, | Performed by: PATHOLOGY

## 2021-05-13 PROCEDURE — D9220A PRA ANESTHESIA: Mod: ANES,,, | Performed by: ANESTHESIOLOGY

## 2021-05-13 PROCEDURE — 47562 LAPAROSCOPIC CHOLECYSTECTOMY: CPT | Mod: ,,, | Performed by: SURGERY

## 2021-05-13 PROCEDURE — 88304 TISSUE EXAM BY PATHOLOGIST: CPT | Performed by: PATHOLOGY

## 2021-05-13 PROCEDURE — 36000708 HC OR TIME LEV III 1ST 15 MIN: Performed by: SURGERY

## 2021-05-13 PROCEDURE — 36000709 HC OR TIME LEV III EA ADD 15 MIN: Performed by: SURGERY

## 2021-05-13 PROCEDURE — 71000016 HC POSTOP RECOV ADDL HR: Performed by: SURGERY

## 2021-05-13 PROCEDURE — 71000044 HC DOSC ROUTINE RECOVERY FIRST HOUR: Performed by: SURGERY

## 2021-05-13 PROCEDURE — 25000003 PHARM REV CODE 250: Performed by: SURGERY

## 2021-05-13 PROCEDURE — 81025 URINE PREGNANCY TEST: CPT | Performed by: SURGERY

## 2021-05-13 PROCEDURE — D9220A PRA ANESTHESIA: Mod: CRNA,,, | Performed by: NURSE ANESTHETIST, CERTIFIED REGISTERED

## 2021-05-13 PROCEDURE — 27201423 OPTIME MED/SURG SUP & DEVICES STERILE SUPPLY: Performed by: SURGERY

## 2021-05-13 PROCEDURE — 88304 PR  SURG PATH,LEVEL III: ICD-10-PCS | Mod: 26,,, | Performed by: PATHOLOGY

## 2021-05-13 PROCEDURE — 37000009 HC ANESTHESIA EA ADD 15 MINS: Performed by: SURGERY

## 2021-05-13 RX ORDER — PROPOFOL 10 MG/ML
VIAL (ML) INTRAVENOUS
Status: DISCONTINUED | OUTPATIENT
Start: 2021-05-13 | End: 2021-05-13

## 2021-05-13 RX ORDER — ONDANSETRON 2 MG/ML
INJECTION INTRAMUSCULAR; INTRAVENOUS
Status: DISCONTINUED | OUTPATIENT
Start: 2021-05-13 | End: 2021-05-13

## 2021-05-13 RX ORDER — FENTANYL CITRATE 50 UG/ML
INJECTION, SOLUTION INTRAMUSCULAR; INTRAVENOUS
Status: DISCONTINUED | OUTPATIENT
Start: 2021-05-13 | End: 2021-05-13

## 2021-05-13 RX ORDER — MIDAZOLAM HYDROCHLORIDE 1 MG/ML
INJECTION, SOLUTION INTRAMUSCULAR; INTRAVENOUS
Status: DISCONTINUED | OUTPATIENT
Start: 2021-05-13 | End: 2021-05-13

## 2021-05-13 RX ORDER — LIDOCAINE HYDROCHLORIDE 20 MG/ML
INJECTION, SOLUTION EPIDURAL; INFILTRATION; INTRACAUDAL; PERINEURAL
Status: DISCONTINUED | OUTPATIENT
Start: 2021-05-13 | End: 2021-05-13

## 2021-05-13 RX ORDER — NEOSTIGMINE METHYLSULFATE 0.5 MG/ML
INJECTION, SOLUTION INTRAVENOUS
Status: DISCONTINUED | OUTPATIENT
Start: 2021-05-13 | End: 2021-05-13

## 2021-05-13 RX ORDER — SODIUM CHLORIDE 0.9 % (FLUSH) 0.9 %
3 SYRINGE (ML) INJECTION
Status: DISCONTINUED | OUTPATIENT
Start: 2021-05-13 | End: 2021-05-13 | Stop reason: HOSPADM

## 2021-05-13 RX ORDER — ACETAMINOPHEN 500 MG
1000 TABLET ORAL
Status: DISCONTINUED | OUTPATIENT
Start: 2021-05-13 | End: 2021-05-13

## 2021-05-13 RX ORDER — SCOLOPAMINE TRANSDERMAL SYSTEM 1 MG/1
1 PATCH, EXTENDED RELEASE TRANSDERMAL
Status: DISCONTINUED | OUTPATIENT
Start: 2021-05-13 | End: 2021-05-13 | Stop reason: HOSPADM

## 2021-05-13 RX ORDER — CEFAZOLIN SODIUM 1 G/3ML
2 INJECTION, POWDER, FOR SOLUTION INTRAMUSCULAR; INTRAVENOUS
Status: COMPLETED | OUTPATIENT
Start: 2021-05-13 | End: 2021-05-13

## 2021-05-13 RX ORDER — FENTANYL CITRATE 50 UG/ML
25 INJECTION, SOLUTION INTRAMUSCULAR; INTRAVENOUS EVERY 5 MIN PRN
Status: DISCONTINUED | OUTPATIENT
Start: 2021-05-13 | End: 2021-05-13 | Stop reason: HOSPADM

## 2021-05-13 RX ORDER — ACETAMINOPHEN 10 MG/ML
INJECTION, SOLUTION INTRAVENOUS
Status: DISCONTINUED | OUTPATIENT
Start: 2021-05-13 | End: 2021-05-13

## 2021-05-13 RX ORDER — FENTANYL CITRATE 50 UG/ML
25 INJECTION, SOLUTION INTRAMUSCULAR; INTRAVENOUS EVERY 5 MIN PRN
Status: COMPLETED | OUTPATIENT
Start: 2021-05-13 | End: 2021-05-13

## 2021-05-13 RX ORDER — DEXAMETHASONE SODIUM PHOSPHATE 4 MG/ML
INJECTION, SOLUTION INTRA-ARTICULAR; INTRALESIONAL; INTRAMUSCULAR; INTRAVENOUS; SOFT TISSUE
Status: DISCONTINUED | OUTPATIENT
Start: 2021-05-13 | End: 2021-05-13

## 2021-05-13 RX ORDER — ONDANSETRON 2 MG/ML
4 INJECTION INTRAMUSCULAR; INTRAVENOUS ONCE
Status: COMPLETED | OUTPATIENT
Start: 2021-05-13 | End: 2021-05-13

## 2021-05-13 RX ORDER — FENTANYL CITRATE 50 UG/ML
25 INJECTION, SOLUTION INTRAMUSCULAR; INTRAVENOUS
Status: DISCONTINUED | OUTPATIENT
Start: 2021-05-13 | End: 2021-05-13

## 2021-05-13 RX ORDER — BUPIVACAINE HYDROCHLORIDE 5 MG/ML
INJECTION, SOLUTION EPIDURAL; INTRACAUDAL
Status: DISCONTINUED | OUTPATIENT
Start: 2021-05-13 | End: 2021-05-13 | Stop reason: HOSPADM

## 2021-05-13 RX ORDER — AMOXICILLIN 500 MG
CAPSULE ORAL DAILY
COMMUNITY

## 2021-05-13 RX ORDER — ROCURONIUM BROMIDE 10 MG/ML
INJECTION, SOLUTION INTRAVENOUS
Status: DISCONTINUED | OUTPATIENT
Start: 2021-05-13 | End: 2021-05-13

## 2021-05-13 RX ORDER — SODIUM CHLORIDE 9 MG/ML
INJECTION, SOLUTION INTRAVENOUS CONTINUOUS
Status: DISCONTINUED | OUTPATIENT
Start: 2021-05-13 | End: 2022-02-24

## 2021-05-13 RX ORDER — OXYCODONE HYDROCHLORIDE 5 MG/1
5 TABLET ORAL
Status: COMPLETED | OUTPATIENT
Start: 2021-05-13 | End: 2021-05-13

## 2021-05-13 RX ORDER — MUPIROCIN 20 MG/G
OINTMENT TOPICAL
Status: DISCONTINUED | OUTPATIENT
Start: 2021-05-13 | End: 2022-02-24

## 2021-05-13 RX ADMIN — NEOSTIGMINE METHYLSULFATE 4 MG: 0.5 INJECTION INTRAVENOUS at 09:05

## 2021-05-13 RX ADMIN — ROCURONIUM BROMIDE 10 MG: 10 INJECTION, SOLUTION INTRAVENOUS at 08:05

## 2021-05-13 RX ADMIN — FENTANYL CITRATE 25 MCG: 50 INJECTION INTRAMUSCULAR; INTRAVENOUS at 08:05

## 2021-05-13 RX ADMIN — LIDOCAINE HYDROCHLORIDE 60 MG: 20 INJECTION, SOLUTION EPIDURAL; INFILTRATION; INTRACAUDAL at 07:05

## 2021-05-13 RX ADMIN — ACETAMINOPHEN 1000 MG: 10 INJECTION INTRAVENOUS at 07:05

## 2021-05-13 RX ADMIN — OXYCODONE HYDROCHLORIDE 5 MG: 5 TABLET ORAL at 11:05

## 2021-05-13 RX ADMIN — ROCURONIUM BROMIDE 30 MG: 10 INJECTION, SOLUTION INTRAVENOUS at 07:05

## 2021-05-13 RX ADMIN — SCOPALAMINE 1 PATCH: 1 PATCH, EXTENDED RELEASE TRANSDERMAL at 09:05

## 2021-05-13 RX ADMIN — SODIUM CHLORIDE: 0.9 INJECTION, SOLUTION INTRAVENOUS at 06:05

## 2021-05-13 RX ADMIN — DEXAMETHASONE SODIUM PHOSPHATE 8 MG: 4 INJECTION INTRA-ARTICULAR; INTRALESIONAL; INTRAMUSCULAR; INTRAVENOUS; SOFT TISSUE at 07:05

## 2021-05-13 RX ADMIN — FENTANYL CITRATE 25 MCG: 50 INJECTION INTRAMUSCULAR; INTRAVENOUS at 10:05

## 2021-05-13 RX ADMIN — PROPOFOL 200 MG: 10 INJECTION, EMULSION INTRAVENOUS at 07:05

## 2021-05-13 RX ADMIN — ONDANSETRON 4 MG: 2 INJECTION INTRAMUSCULAR; INTRAVENOUS at 10:05

## 2021-05-13 RX ADMIN — MIDAZOLAM 2 MG: 1 INJECTION INTRAMUSCULAR; INTRAVENOUS at 06:05

## 2021-05-13 RX ADMIN — FENTANYL CITRATE 50 MCG: 50 INJECTION INTRAMUSCULAR; INTRAVENOUS at 07:05

## 2021-05-13 RX ADMIN — GLYCOPYRROLATE 0.6 MG: 0.2 INJECTION, SOLUTION INTRAMUSCULAR; INTRAVITREAL at 09:05

## 2021-05-13 RX ADMIN — ROCURONIUM BROMIDE 10 MG: 10 INJECTION, SOLUTION INTRAVENOUS at 07:05

## 2021-05-13 RX ADMIN — ONDANSETRON 4 MG: 2 INJECTION INTRAMUSCULAR; INTRAVENOUS at 09:05

## 2021-05-13 RX ADMIN — FENTANYL CITRATE 25 MCG: 50 INJECTION INTRAMUSCULAR; INTRAVENOUS at 07:05

## 2021-05-13 RX ADMIN — CEFAZOLIN 2 G: 330 INJECTION, POWDER, FOR SOLUTION INTRAMUSCULAR; INTRAVENOUS at 07:05

## 2021-05-13 RX ADMIN — MUPIROCIN: 20 OINTMENT TOPICAL at 06:05

## 2021-05-20 LAB
FINAL PATHOLOGIC DIAGNOSIS: NORMAL
Lab: NORMAL

## 2021-05-27 ENCOUNTER — OFFICE VISIT (OUTPATIENT)
Dept: SURGERY | Facility: CLINIC | Age: 51
End: 2021-05-27
Payer: COMMERCIAL

## 2021-05-27 VITALS
WEIGHT: 199.31 LBS | DIASTOLIC BLOOD PRESSURE: 82 MMHG | BODY MASS INDEX: 34.03 KG/M2 | SYSTOLIC BLOOD PRESSURE: 134 MMHG | HEART RATE: 95 BPM | HEIGHT: 64 IN

## 2021-05-27 DIAGNOSIS — K81.9 CHOLECYSTITIS: ICD-10-CM

## 2021-05-27 DIAGNOSIS — Z90.49 S/P LAPAROSCOPIC CHOLECYSTECTOMY: Primary | ICD-10-CM

## 2021-05-27 PROCEDURE — 99999 PR PBB SHADOW E&M-EST. PATIENT-LVL III: CPT | Mod: PBBFAC,,, | Performed by: SURGERY

## 2021-05-27 PROCEDURE — 3008F BODY MASS INDEX DOCD: CPT | Mod: CPTII,S$GLB,, | Performed by: SURGERY

## 2021-05-27 PROCEDURE — 99999 PR PBB SHADOW E&M-EST. PATIENT-LVL III: ICD-10-PCS | Mod: PBBFAC,,, | Performed by: SURGERY

## 2021-05-27 PROCEDURE — 99024 PR POST-OP FOLLOW-UP VISIT: ICD-10-PCS | Mod: S$GLB,,, | Performed by: SURGERY

## 2021-05-27 PROCEDURE — 1125F AMNT PAIN NOTED PAIN PRSNT: CPT | Mod: S$GLB,,, | Performed by: SURGERY

## 2021-05-27 PROCEDURE — 3008F PR BODY MASS INDEX (BMI) DOCUMENTED: ICD-10-PCS | Mod: CPTII,S$GLB,, | Performed by: SURGERY

## 2021-05-27 PROCEDURE — 1125F PR PAIN SEVERITY QUANTIFIED, PAIN PRESENT: ICD-10-PCS | Mod: S$GLB,,, | Performed by: SURGERY

## 2021-05-27 PROCEDURE — 99024 POSTOP FOLLOW-UP VISIT: CPT | Mod: S$GLB,,, | Performed by: SURGERY

## 2021-06-14 ENCOUNTER — OFFICE VISIT (OUTPATIENT)
Dept: PRIMARY CARE CLINIC | Facility: CLINIC | Age: 51
End: 2021-06-14
Payer: COMMERCIAL

## 2021-06-14 VITALS
OXYGEN SATURATION: 99 % | BODY MASS INDEX: 33.38 KG/M2 | HEIGHT: 64 IN | WEIGHT: 195.56 LBS | TEMPERATURE: 98 F | DIASTOLIC BLOOD PRESSURE: 84 MMHG | SYSTOLIC BLOOD PRESSURE: 114 MMHG | HEART RATE: 103 BPM

## 2021-06-14 DIAGNOSIS — Z23 NEED FOR SHINGLES VACCINE: ICD-10-CM

## 2021-06-14 DIAGNOSIS — I10 ESSENTIAL HYPERTENSION: Primary | ICD-10-CM

## 2021-06-14 DIAGNOSIS — J30.89 PERENNIAL ALLERGIC RHINITIS: ICD-10-CM

## 2021-06-14 PROCEDURE — 1125F PR PAIN SEVERITY QUANTIFIED, PAIN PRESENT: ICD-10-PCS | Mod: S$GLB,,, | Performed by: INTERNAL MEDICINE

## 2021-06-14 PROCEDURE — 3079F DIAST BP 80-89 MM HG: CPT | Mod: CPTII,S$GLB,, | Performed by: INTERNAL MEDICINE

## 2021-06-14 PROCEDURE — 3074F SYST BP LT 130 MM HG: CPT | Mod: CPTII,S$GLB,, | Performed by: INTERNAL MEDICINE

## 2021-06-14 PROCEDURE — 99214 OFFICE O/P EST MOD 30 MIN: CPT | Mod: S$GLB,,, | Performed by: INTERNAL MEDICINE

## 2021-06-14 PROCEDURE — 99999 PR PBB SHADOW E&M-EST. PATIENT-LVL III: CPT | Mod: PBBFAC,,, | Performed by: INTERNAL MEDICINE

## 2021-06-14 PROCEDURE — 3079F PR MOST RECENT DIASTOLIC BLOOD PRESSURE 80-89 MM HG: ICD-10-PCS | Mod: CPTII,S$GLB,, | Performed by: INTERNAL MEDICINE

## 2021-06-14 PROCEDURE — 99999 PR PBB SHADOW E&M-EST. PATIENT-LVL III: ICD-10-PCS | Mod: PBBFAC,,, | Performed by: INTERNAL MEDICINE

## 2021-06-14 PROCEDURE — 99214 PR OFFICE/OUTPT VISIT, EST, LEVL IV, 30-39 MIN: ICD-10-PCS | Mod: S$GLB,,, | Performed by: INTERNAL MEDICINE

## 2021-06-14 PROCEDURE — 3008F BODY MASS INDEX DOCD: CPT | Mod: CPTII,S$GLB,, | Performed by: INTERNAL MEDICINE

## 2021-06-14 PROCEDURE — 3008F PR BODY MASS INDEX (BMI) DOCUMENTED: ICD-10-PCS | Mod: CPTII,S$GLB,, | Performed by: INTERNAL MEDICINE

## 2021-06-14 PROCEDURE — 3074F PR MOST RECENT SYSTOLIC BLOOD PRESSURE < 130 MM HG: ICD-10-PCS | Mod: CPTII,S$GLB,, | Performed by: INTERNAL MEDICINE

## 2021-06-14 PROCEDURE — 1125F AMNT PAIN NOTED PAIN PRSNT: CPT | Mod: S$GLB,,, | Performed by: INTERNAL MEDICINE

## 2021-06-14 RX ORDER — AMLODIPINE BESYLATE 5 MG/1
5 TABLET ORAL DAILY
Qty: 90 TABLET | Refills: 1 | Status: SHIPPED | OUTPATIENT
Start: 2021-06-14 | End: 2022-01-31

## 2021-06-14 RX ORDER — MONTELUKAST SODIUM 10 MG/1
10 TABLET ORAL DAILY
Qty: 90 TABLET | Refills: 1 | Status: SHIPPED | OUTPATIENT
Start: 2021-06-14 | End: 2022-04-26

## 2021-06-14 RX ORDER — HYDROCHLOROTHIAZIDE 12.5 MG/1
12.5 CAPSULE ORAL DAILY
Qty: 90 CAPSULE | Refills: 1 | Status: SHIPPED | OUTPATIENT
Start: 2021-06-14 | End: 2022-08-04

## 2021-06-16 ENCOUNTER — OFFICE VISIT (OUTPATIENT)
Dept: OBSTETRICS AND GYNECOLOGY | Facility: CLINIC | Age: 51
End: 2021-06-16
Payer: COMMERCIAL

## 2021-06-16 DIAGNOSIS — N95.1 MENOPAUSAL SYMPTOM: Primary | ICD-10-CM

## 2021-06-16 PROCEDURE — 99213 PR OFFICE/OUTPT VISIT, EST, LEVL III, 20-29 MIN: ICD-10-PCS | Mod: 95,,, | Performed by: STUDENT IN AN ORGANIZED HEALTH CARE EDUCATION/TRAINING PROGRAM

## 2021-06-16 PROCEDURE — 99213 OFFICE O/P EST LOW 20 MIN: CPT | Mod: 95,,, | Performed by: STUDENT IN AN ORGANIZED HEALTH CARE EDUCATION/TRAINING PROGRAM

## 2021-08-04 ENCOUNTER — CLINICAL SUPPORT (OUTPATIENT)
Dept: URGENT CARE | Facility: CLINIC | Age: 51
End: 2021-08-04
Payer: COMMERCIAL

## 2021-08-04 DIAGNOSIS — Z20.822 EXPOSURE TO COVID-19 VIRUS: Primary | ICD-10-CM

## 2021-08-04 LAB
CTP QC/QA: YES
SARS-COV-2 RDRP RESP QL NAA+PROBE: NEGATIVE

## 2021-08-04 PROCEDURE — U0002: ICD-10-PCS | Mod: QW,S$GLB,, | Performed by: FAMILY MEDICINE

## 2021-08-04 PROCEDURE — U0002 COVID-19 LAB TEST NON-CDC: HCPCS | Mod: QW,S$GLB,, | Performed by: FAMILY MEDICINE

## 2021-08-19 ENCOUNTER — OFFICE VISIT (OUTPATIENT)
Dept: INTERNAL MEDICINE | Facility: CLINIC | Age: 51
End: 2021-08-19
Payer: COMMERCIAL

## 2021-08-19 VITALS
DIASTOLIC BLOOD PRESSURE: 68 MMHG | BODY MASS INDEX: 34.4 KG/M2 | WEIGHT: 201.5 LBS | HEART RATE: 78 BPM | SYSTOLIC BLOOD PRESSURE: 116 MMHG | HEIGHT: 64 IN | OXYGEN SATURATION: 99 % | TEMPERATURE: 98 F

## 2021-08-19 DIAGNOSIS — H65.92 MIDDLE EAR EFFUSION, LEFT: Primary | ICD-10-CM

## 2021-08-19 PROCEDURE — 3008F BODY MASS INDEX DOCD: CPT | Mod: CPTII,S$GLB,, | Performed by: INTERNAL MEDICINE

## 2021-08-19 PROCEDURE — 1125F AMNT PAIN NOTED PAIN PRSNT: CPT | Mod: CPTII,S$GLB,, | Performed by: INTERNAL MEDICINE

## 2021-08-19 PROCEDURE — 99213 OFFICE O/P EST LOW 20 MIN: CPT | Mod: S$GLB,,, | Performed by: INTERNAL MEDICINE

## 2021-08-19 PROCEDURE — 1160F PR REVIEW ALL MEDS BY PRESCRIBER/CLIN PHARMACIST DOCUMENTED: ICD-10-PCS | Mod: CPTII,S$GLB,, | Performed by: INTERNAL MEDICINE

## 2021-08-19 PROCEDURE — 3008F PR BODY MASS INDEX (BMI) DOCUMENTED: ICD-10-PCS | Mod: CPTII,S$GLB,, | Performed by: INTERNAL MEDICINE

## 2021-08-19 PROCEDURE — 3078F DIAST BP <80 MM HG: CPT | Mod: CPTII,S$GLB,, | Performed by: INTERNAL MEDICINE

## 2021-08-19 PROCEDURE — 3078F PR MOST RECENT DIASTOLIC BLOOD PRESSURE < 80 MM HG: ICD-10-PCS | Mod: CPTII,S$GLB,, | Performed by: INTERNAL MEDICINE

## 2021-08-19 PROCEDURE — 1159F MED LIST DOCD IN RCRD: CPT | Mod: CPTII,S$GLB,, | Performed by: INTERNAL MEDICINE

## 2021-08-19 PROCEDURE — 99999 PR PBB SHADOW E&M-EST. PATIENT-LVL V: CPT | Mod: PBBFAC,,, | Performed by: INTERNAL MEDICINE

## 2021-08-19 PROCEDURE — 1125F PR PAIN SEVERITY QUANTIFIED, PAIN PRESENT: ICD-10-PCS | Mod: CPTII,S$GLB,, | Performed by: INTERNAL MEDICINE

## 2021-08-19 PROCEDURE — 1159F PR MEDICATION LIST DOCUMENTED IN MEDICAL RECORD: ICD-10-PCS | Mod: CPTII,S$GLB,, | Performed by: INTERNAL MEDICINE

## 2021-08-19 PROCEDURE — 99213 PR OFFICE/OUTPT VISIT, EST, LEVL III, 20-29 MIN: ICD-10-PCS | Mod: S$GLB,,, | Performed by: INTERNAL MEDICINE

## 2021-08-19 PROCEDURE — 3074F SYST BP LT 130 MM HG: CPT | Mod: CPTII,S$GLB,, | Performed by: INTERNAL MEDICINE

## 2021-08-19 PROCEDURE — 1160F RVW MEDS BY RX/DR IN RCRD: CPT | Mod: CPTII,S$GLB,, | Performed by: INTERNAL MEDICINE

## 2021-08-19 PROCEDURE — 99999 PR PBB SHADOW E&M-EST. PATIENT-LVL V: ICD-10-PCS | Mod: PBBFAC,,, | Performed by: INTERNAL MEDICINE

## 2021-08-19 PROCEDURE — 3074F PR MOST RECENT SYSTOLIC BLOOD PRESSURE < 130 MM HG: ICD-10-PCS | Mod: CPTII,S$GLB,, | Performed by: INTERNAL MEDICINE

## 2021-08-27 ENCOUNTER — OFFICE VISIT (OUTPATIENT)
Dept: URGENT CARE | Facility: CLINIC | Age: 51
End: 2021-08-27
Payer: COMMERCIAL

## 2021-08-27 VITALS
HEIGHT: 64 IN | RESPIRATION RATE: 16 BRPM | TEMPERATURE: 99 F | DIASTOLIC BLOOD PRESSURE: 95 MMHG | WEIGHT: 201 LBS | HEART RATE: 110 BPM | SYSTOLIC BLOOD PRESSURE: 137 MMHG | OXYGEN SATURATION: 98 % | BODY MASS INDEX: 34.31 KG/M2

## 2021-08-27 DIAGNOSIS — R10.13 EPIGASTRIC PAIN: ICD-10-CM

## 2021-08-27 DIAGNOSIS — A08.4 VIRAL GASTROENTERITIS: Primary | ICD-10-CM

## 2021-08-27 DIAGNOSIS — Z20.822 ENCOUNTER FOR LABORATORY TESTING FOR COVID-19 VIRUS: ICD-10-CM

## 2021-08-27 LAB
CTP QC/QA: YES
SARS-COV-2 RDRP RESP QL NAA+PROBE: NEGATIVE

## 2021-08-27 PROCEDURE — U0002: ICD-10-PCS | Mod: QW,S$GLB,, | Performed by: STUDENT IN AN ORGANIZED HEALTH CARE EDUCATION/TRAINING PROGRAM

## 2021-08-27 PROCEDURE — 3008F PR BODY MASS INDEX (BMI) DOCUMENTED: ICD-10-PCS | Mod: CPTII,S$GLB,, | Performed by: STUDENT IN AN ORGANIZED HEALTH CARE EDUCATION/TRAINING PROGRAM

## 2021-08-27 PROCEDURE — 99213 OFFICE O/P EST LOW 20 MIN: CPT | Mod: S$GLB,,, | Performed by: STUDENT IN AN ORGANIZED HEALTH CARE EDUCATION/TRAINING PROGRAM

## 2021-08-27 PROCEDURE — U0002 COVID-19 LAB TEST NON-CDC: HCPCS | Mod: QW,S$GLB,, | Performed by: STUDENT IN AN ORGANIZED HEALTH CARE EDUCATION/TRAINING PROGRAM

## 2021-08-27 PROCEDURE — 99213 PR OFFICE/OUTPT VISIT, EST, LEVL III, 20-29 MIN: ICD-10-PCS | Mod: S$GLB,,, | Performed by: STUDENT IN AN ORGANIZED HEALTH CARE EDUCATION/TRAINING PROGRAM

## 2021-08-27 PROCEDURE — 3008F BODY MASS INDEX DOCD: CPT | Mod: CPTII,S$GLB,, | Performed by: STUDENT IN AN ORGANIZED HEALTH CARE EDUCATION/TRAINING PROGRAM

## 2021-08-27 PROCEDURE — 1160F RVW MEDS BY RX/DR IN RCRD: CPT | Mod: CPTII,S$GLB,, | Performed by: STUDENT IN AN ORGANIZED HEALTH CARE EDUCATION/TRAINING PROGRAM

## 2021-08-27 PROCEDURE — 1159F MED LIST DOCD IN RCRD: CPT | Mod: CPTII,S$GLB,, | Performed by: STUDENT IN AN ORGANIZED HEALTH CARE EDUCATION/TRAINING PROGRAM

## 2021-08-27 PROCEDURE — 3075F SYST BP GE 130 - 139MM HG: CPT | Mod: CPTII,S$GLB,, | Performed by: STUDENT IN AN ORGANIZED HEALTH CARE EDUCATION/TRAINING PROGRAM

## 2021-08-27 PROCEDURE — 3075F PR MOST RECENT SYSTOLIC BLOOD PRESS GE 130-139MM HG: ICD-10-PCS | Mod: CPTII,S$GLB,, | Performed by: STUDENT IN AN ORGANIZED HEALTH CARE EDUCATION/TRAINING PROGRAM

## 2021-08-27 PROCEDURE — 1159F PR MEDICATION LIST DOCUMENTED IN MEDICAL RECORD: ICD-10-PCS | Mod: CPTII,S$GLB,, | Performed by: STUDENT IN AN ORGANIZED HEALTH CARE EDUCATION/TRAINING PROGRAM

## 2021-08-27 PROCEDURE — 3080F PR MOST RECENT DIASTOLIC BLOOD PRESSURE >= 90 MM HG: ICD-10-PCS | Mod: CPTII,S$GLB,, | Performed by: STUDENT IN AN ORGANIZED HEALTH CARE EDUCATION/TRAINING PROGRAM

## 2021-08-27 PROCEDURE — 1160F PR REVIEW ALL MEDS BY PRESCRIBER/CLIN PHARMACIST DOCUMENTED: ICD-10-PCS | Mod: CPTII,S$GLB,, | Performed by: STUDENT IN AN ORGANIZED HEALTH CARE EDUCATION/TRAINING PROGRAM

## 2021-08-27 PROCEDURE — 3080F DIAST BP >= 90 MM HG: CPT | Mod: CPTII,S$GLB,, | Performed by: STUDENT IN AN ORGANIZED HEALTH CARE EDUCATION/TRAINING PROGRAM

## 2021-08-27 RX ORDER — ONDANSETRON 4 MG/1
4 TABLET, ORALLY DISINTEGRATING ORAL EVERY 6 HOURS PRN
Qty: 6 TABLET | Refills: 0 | Status: SHIPPED | OUTPATIENT
Start: 2021-08-27 | End: 2021-08-29

## 2021-08-27 RX ORDER — HYOSCYAMINE SULFATE 0.125 MG
125 TABLET ORAL EVERY 6 HOURS PRN
Qty: 10 TABLET | Refills: 0 | Status: SHIPPED | OUTPATIENT
Start: 2021-08-27 | End: 2021-09-01

## 2021-09-24 ENCOUNTER — OFFICE VISIT (OUTPATIENT)
Dept: PRIMARY CARE CLINIC | Facility: CLINIC | Age: 51
End: 2021-09-24
Payer: COMMERCIAL

## 2021-09-24 VITALS
HEIGHT: 64 IN | TEMPERATURE: 99 F | WEIGHT: 199.5 LBS | SYSTOLIC BLOOD PRESSURE: 120 MMHG | DIASTOLIC BLOOD PRESSURE: 80 MMHG | OXYGEN SATURATION: 98 % | BODY MASS INDEX: 34.06 KG/M2 | HEART RATE: 80 BPM

## 2021-09-24 DIAGNOSIS — Z12.31 ENCOUNTER FOR SCREENING MAMMOGRAM FOR BREAST CANCER: ICD-10-CM

## 2021-09-24 DIAGNOSIS — M54.50 ACUTE BILATERAL LOW BACK PAIN WITHOUT SCIATICA: ICD-10-CM

## 2021-09-24 DIAGNOSIS — R82.90 ABNORMAL URINALYSIS: ICD-10-CM

## 2021-09-24 DIAGNOSIS — R10.9 BILATERAL FLANK PAIN: Primary | ICD-10-CM

## 2021-09-24 DIAGNOSIS — I10 ESSENTIAL HYPERTENSION: ICD-10-CM

## 2021-09-24 LAB
BILIRUB SERPL-MCNC: NORMAL MG/DL
BLOOD URINE, POC: NORMAL
CLARITY, POC UA: NORMAL
COLOR, POC UA: YELLOW
GLUCOSE UR QL STRIP: NORMAL
KETONES UR QL STRIP: NORMAL
LEUKOCYTE ESTERASE URINE, POC: NORMAL
NITRITE, POC UA: NORMAL
PH, POC UA: 5
PROTEIN, POC: NORMAL
SPECIFIC GRAVITY, POC UA: 1.02
UROBILINOGEN, POC UA: NORMAL

## 2021-09-24 PROCEDURE — 3079F DIAST BP 80-89 MM HG: CPT | Mod: CPTII,S$GLB,, | Performed by: INTERNAL MEDICINE

## 2021-09-24 PROCEDURE — 3074F PR MOST RECENT SYSTOLIC BLOOD PRESSURE < 130 MM HG: ICD-10-PCS | Mod: CPTII,S$GLB,, | Performed by: INTERNAL MEDICINE

## 2021-09-24 PROCEDURE — 1159F MED LIST DOCD IN RCRD: CPT | Mod: CPTII,S$GLB,, | Performed by: INTERNAL MEDICINE

## 2021-09-24 PROCEDURE — 99999 PR PBB SHADOW E&M-EST. PATIENT-LVL IV: ICD-10-PCS | Mod: PBBFAC,,, | Performed by: INTERNAL MEDICINE

## 2021-09-24 PROCEDURE — 81002 POCT URINE DIPSTICK WITHOUT MICROSCOPE: ICD-10-PCS | Mod: S$GLB,,, | Performed by: INTERNAL MEDICINE

## 2021-09-24 PROCEDURE — 1160F RVW MEDS BY RX/DR IN RCRD: CPT | Mod: CPTII,S$GLB,, | Performed by: INTERNAL MEDICINE

## 2021-09-24 PROCEDURE — 99999 PR PBB SHADOW E&M-EST. PATIENT-LVL IV: CPT | Mod: PBBFAC,,, | Performed by: INTERNAL MEDICINE

## 2021-09-24 PROCEDURE — 81002 URINALYSIS NONAUTO W/O SCOPE: CPT | Mod: S$GLB,,, | Performed by: INTERNAL MEDICINE

## 2021-09-24 PROCEDURE — 3079F PR MOST RECENT DIASTOLIC BLOOD PRESSURE 80-89 MM HG: ICD-10-PCS | Mod: CPTII,S$GLB,, | Performed by: INTERNAL MEDICINE

## 2021-09-24 PROCEDURE — 1159F PR MEDICATION LIST DOCUMENTED IN MEDICAL RECORD: ICD-10-PCS | Mod: CPTII,S$GLB,, | Performed by: INTERNAL MEDICINE

## 2021-09-24 PROCEDURE — 99214 OFFICE O/P EST MOD 30 MIN: CPT | Mod: 25,S$GLB,, | Performed by: INTERNAL MEDICINE

## 2021-09-24 PROCEDURE — 3008F BODY MASS INDEX DOCD: CPT | Mod: CPTII,S$GLB,, | Performed by: INTERNAL MEDICINE

## 2021-09-24 PROCEDURE — 87086 URINE CULTURE/COLONY COUNT: CPT | Performed by: INTERNAL MEDICINE

## 2021-09-24 PROCEDURE — 3074F SYST BP LT 130 MM HG: CPT | Mod: CPTII,S$GLB,, | Performed by: INTERNAL MEDICINE

## 2021-09-24 PROCEDURE — 3008F PR BODY MASS INDEX (BMI) DOCUMENTED: ICD-10-PCS | Mod: CPTII,S$GLB,, | Performed by: INTERNAL MEDICINE

## 2021-09-24 PROCEDURE — 1160F PR REVIEW ALL MEDS BY PRESCRIBER/CLIN PHARMACIST DOCUMENTED: ICD-10-PCS | Mod: CPTII,S$GLB,, | Performed by: INTERNAL MEDICINE

## 2021-09-24 PROCEDURE — 99214 PR OFFICE/OUTPT VISIT, EST, LEVL IV, 30-39 MIN: ICD-10-PCS | Mod: 25,S$GLB,, | Performed by: INTERNAL MEDICINE

## 2021-09-24 RX ORDER — KETOROLAC TROMETHAMINE 10 MG/1
10 TABLET, FILM COATED ORAL EVERY 6 HOURS
COMMUNITY
End: 2021-12-07

## 2021-09-25 LAB — BACTERIA UR CULT: NO GROWTH

## 2021-09-26 ENCOUNTER — PATIENT MESSAGE (OUTPATIENT)
Dept: OPTOMETRY | Facility: CLINIC | Age: 51
End: 2021-09-26

## 2021-09-27 ENCOUNTER — OFFICE VISIT (OUTPATIENT)
Dept: OPTOMETRY | Facility: CLINIC | Age: 51
End: 2021-09-27
Payer: COMMERCIAL

## 2021-09-27 DIAGNOSIS — H20.00 ACUTE IRITIS: Primary | ICD-10-CM

## 2021-09-27 DIAGNOSIS — H04.123 DRY EYE SYNDROME, BILATERAL: ICD-10-CM

## 2021-09-27 PROCEDURE — 1159F MED LIST DOCD IN RCRD: CPT | Mod: CPTII,S$GLB,, | Performed by: OPTOMETRIST

## 2021-09-27 PROCEDURE — 1159F PR MEDICATION LIST DOCUMENTED IN MEDICAL RECORD: ICD-10-PCS | Mod: CPTII,S$GLB,, | Performed by: OPTOMETRIST

## 2021-09-27 PROCEDURE — 92012 PR EYE EXAM, EST PATIENT,INTERMED: ICD-10-PCS | Mod: S$GLB,,, | Performed by: OPTOMETRIST

## 2021-09-27 PROCEDURE — 99999 PR PBB SHADOW E&M-EST. PATIENT-LVL II: CPT | Mod: PBBFAC,,, | Performed by: OPTOMETRIST

## 2021-09-27 PROCEDURE — 99999 PR PBB SHADOW E&M-EST. PATIENT-LVL II: ICD-10-PCS | Mod: PBBFAC,,, | Performed by: OPTOMETRIST

## 2021-09-27 PROCEDURE — 92012 INTRM OPH EXAM EST PATIENT: CPT | Mod: S$GLB,,, | Performed by: OPTOMETRIST

## 2021-10-05 ENCOUNTER — PATIENT MESSAGE (OUTPATIENT)
Dept: ADMINISTRATIVE | Facility: HOSPITAL | Age: 51
End: 2021-10-05

## 2021-10-19 ENCOUNTER — PATIENT MESSAGE (OUTPATIENT)
Dept: OPTOMETRY | Facility: CLINIC | Age: 51
End: 2021-10-19

## 2021-10-19 RX ORDER — DIFLUPREDNATE OPHTHALMIC 0.5 MG/ML
1 EMULSION OPHTHALMIC 4 TIMES DAILY
Qty: 3 ML | Refills: 0 | Status: SHIPPED | OUTPATIENT
Start: 2021-10-19 | End: 2021-10-29

## 2021-11-03 ENCOUNTER — OFFICE VISIT (OUTPATIENT)
Dept: OPTOMETRY | Facility: CLINIC | Age: 51
End: 2021-11-03
Payer: COMMERCIAL

## 2021-11-03 DIAGNOSIS — H04.123 DRY EYE SYNDROME, BILATERAL: ICD-10-CM

## 2021-11-03 DIAGNOSIS — R76.8 ANA POSITIVE: ICD-10-CM

## 2021-11-03 DIAGNOSIS — H16.101 SUPERFICIAL KERATITIS OF RIGHT EYE: ICD-10-CM

## 2021-11-03 DIAGNOSIS — I10 ESSENTIAL HYPERTENSION: ICD-10-CM

## 2021-11-03 DIAGNOSIS — H21.561 PUPIL IRREGULAR OF RIGHT EYE: ICD-10-CM

## 2021-11-03 DIAGNOSIS — H52.4 PRESBYOPIA: ICD-10-CM

## 2021-11-03 DIAGNOSIS — H20.9 UVEITIS: Primary | ICD-10-CM

## 2021-11-03 PROCEDURE — 1159F MED LIST DOCD IN RCRD: CPT | Mod: CPTII,S$GLB,, | Performed by: OPTOMETRIST

## 2021-11-03 PROCEDURE — 99999 PR PBB SHADOW E&M-EST. PATIENT-LVL II: CPT | Mod: PBBFAC,,, | Performed by: OPTOMETRIST

## 2021-11-03 PROCEDURE — 92015 DETERMINE REFRACTIVE STATE: CPT | Mod: S$GLB,,, | Performed by: OPTOMETRIST

## 2021-11-03 PROCEDURE — 92014 COMPRE OPH EXAM EST PT 1/>: CPT | Mod: S$GLB,,, | Performed by: OPTOMETRIST

## 2021-11-03 PROCEDURE — 1159F PR MEDICATION LIST DOCUMENTED IN MEDICAL RECORD: ICD-10-PCS | Mod: CPTII,S$GLB,, | Performed by: OPTOMETRIST

## 2021-11-03 PROCEDURE — 92014 PR EYE EXAM, EST PATIENT,COMPREHESV: ICD-10-PCS | Mod: S$GLB,,, | Performed by: OPTOMETRIST

## 2021-11-03 PROCEDURE — 99999 PR PBB SHADOW E&M-EST. PATIENT-LVL II: ICD-10-PCS | Mod: PBBFAC,,, | Performed by: OPTOMETRIST

## 2021-11-03 PROCEDURE — 92015 PR REFRACTION: ICD-10-PCS | Mod: S$GLB,,, | Performed by: OPTOMETRIST

## 2021-11-19 ENCOUNTER — HOSPITAL ENCOUNTER (OUTPATIENT)
Dept: RADIOLOGY | Facility: HOSPITAL | Age: 51
Discharge: HOME | End: 2021-11-19
Attending: PODIATRIST
Payer: COMMERCIAL

## 2021-11-19 ENCOUNTER — OFFICE VISIT (OUTPATIENT)
Dept: PODIATRY | Facility: CLINIC | Age: 51
End: 2021-11-19
Payer: COMMERCIAL

## 2021-11-19 VITALS
BODY MASS INDEX: 34.06 KG/M2 | HEART RATE: 85 BPM | DIASTOLIC BLOOD PRESSURE: 75 MMHG | WEIGHT: 199.5 LBS | HEIGHT: 64 IN | SYSTOLIC BLOOD PRESSURE: 106 MMHG

## 2021-11-19 DIAGNOSIS — M77.42 METATARSALGIA, LEFT FOOT: ICD-10-CM

## 2021-11-19 DIAGNOSIS — Z98.890 H/O FOOT SURGERY: ICD-10-CM

## 2021-11-19 DIAGNOSIS — G57.92 NEURITIS OF LEFT FOOT: ICD-10-CM

## 2021-11-19 DIAGNOSIS — M77.42 METATARSALGIA, LEFT FOOT: Primary | ICD-10-CM

## 2021-11-19 PROCEDURE — 73630 XR FOOT COMPLETE 3 VIEW LEFT: ICD-10-PCS | Mod: 26,LT,, | Performed by: RADIOLOGY

## 2021-11-19 PROCEDURE — 3008F BODY MASS INDEX DOCD: CPT | Mod: CPTII,S$GLB,, | Performed by: PODIATRIST

## 2021-11-19 PROCEDURE — 3074F PR MOST RECENT SYSTOLIC BLOOD PRESSURE < 130 MM HG: ICD-10-PCS | Mod: CPTII,S$GLB,, | Performed by: PODIATRIST

## 2021-11-19 PROCEDURE — 99203 PR OFFICE/OUTPT VISIT, NEW, LEVL III, 30-44 MIN: ICD-10-PCS | Mod: S$GLB,,, | Performed by: PODIATRIST

## 2021-11-19 PROCEDURE — 99999 PR PBB SHADOW E&M-EST. PATIENT-LVL III: ICD-10-PCS | Mod: PBBFAC,,, | Performed by: PODIATRIST

## 2021-11-19 PROCEDURE — 99203 OFFICE O/P NEW LOW 30 MIN: CPT | Mod: S$GLB,,, | Performed by: PODIATRIST

## 2021-11-19 PROCEDURE — 73630 X-RAY EXAM OF FOOT: CPT | Mod: TC,PN,LT

## 2021-11-19 PROCEDURE — 1159F PR MEDICATION LIST DOCUMENTED IN MEDICAL RECORD: ICD-10-PCS | Mod: CPTII,S$GLB,, | Performed by: PODIATRIST

## 2021-11-19 PROCEDURE — 1159F MED LIST DOCD IN RCRD: CPT | Mod: CPTII,S$GLB,, | Performed by: PODIATRIST

## 2021-11-19 PROCEDURE — 3078F PR MOST RECENT DIASTOLIC BLOOD PRESSURE < 80 MM HG: ICD-10-PCS | Mod: CPTII,S$GLB,, | Performed by: PODIATRIST

## 2021-11-19 PROCEDURE — 3078F DIAST BP <80 MM HG: CPT | Mod: CPTII,S$GLB,, | Performed by: PODIATRIST

## 2021-11-19 PROCEDURE — 73630 X-RAY EXAM OF FOOT: CPT | Mod: 26,LT,, | Performed by: RADIOLOGY

## 2021-11-19 PROCEDURE — 3074F SYST BP LT 130 MM HG: CPT | Mod: CPTII,S$GLB,, | Performed by: PODIATRIST

## 2021-11-19 PROCEDURE — 3008F PR BODY MASS INDEX (BMI) DOCUMENTED: ICD-10-PCS | Mod: CPTII,S$GLB,, | Performed by: PODIATRIST

## 2021-11-19 PROCEDURE — 99999 PR PBB SHADOW E&M-EST. PATIENT-LVL III: CPT | Mod: PBBFAC,,, | Performed by: PODIATRIST

## 2021-11-19 RX ORDER — MELOXICAM 15 MG/1
15 TABLET ORAL DAILY
Qty: 20 TABLET | Refills: 0 | Status: SHIPPED | OUTPATIENT
Start: 2021-11-19 | End: 2021-12-09

## 2021-12-06 ENCOUNTER — PATIENT OUTREACH (OUTPATIENT)
Dept: ADMINISTRATIVE | Facility: OTHER | Age: 51
End: 2021-12-06
Payer: COMMERCIAL

## 2021-12-07 ENCOUNTER — OFFICE VISIT (OUTPATIENT)
Dept: OBSTETRICS AND GYNECOLOGY | Facility: CLINIC | Age: 51
End: 2021-12-07
Payer: COMMERCIAL

## 2021-12-07 ENCOUNTER — HOSPITAL ENCOUNTER (OUTPATIENT)
Dept: RADIOLOGY | Facility: OTHER | Age: 51
Discharge: HOME | End: 2021-12-07
Attending: INTERNAL MEDICINE
Payer: COMMERCIAL

## 2021-12-07 VITALS
HEIGHT: 64 IN | WEIGHT: 196.88 LBS | BODY MASS INDEX: 33.61 KG/M2 | SYSTOLIC BLOOD PRESSURE: 116 MMHG | DIASTOLIC BLOOD PRESSURE: 84 MMHG

## 2021-12-07 DIAGNOSIS — Z01.419 WELL WOMAN EXAM WITH ROUTINE GYNECOLOGICAL EXAM: Primary | ICD-10-CM

## 2021-12-07 DIAGNOSIS — Z12.31 ENCOUNTER FOR SCREENING MAMMOGRAM FOR BREAST CANCER: ICD-10-CM

## 2021-12-07 DIAGNOSIS — Z79.890 HORMONE REPLACEMENT THERAPY (POSTMENOPAUSAL): ICD-10-CM

## 2021-12-07 PROCEDURE — 77067 SCR MAMMO BI INCL CAD: CPT | Mod: TC

## 2021-12-07 PROCEDURE — 77063 MAMMO DIGITAL SCREENING BILAT WITH TOMO: ICD-10-PCS | Mod: 26,,, | Performed by: RADIOLOGY

## 2021-12-07 PROCEDURE — 99999 PR PBB SHADOW E&M-EST. PATIENT-LVL III: CPT | Mod: PBBFAC,,, | Performed by: STUDENT IN AN ORGANIZED HEALTH CARE EDUCATION/TRAINING PROGRAM

## 2021-12-07 PROCEDURE — 77063 BREAST TOMOSYNTHESIS BI: CPT | Mod: 26,,, | Performed by: RADIOLOGY

## 2021-12-07 PROCEDURE — 77067 MAMMO DIGITAL SCREENING BILAT WITH TOMO: ICD-10-PCS | Mod: 26,,, | Performed by: RADIOLOGY

## 2021-12-07 PROCEDURE — 77067 SCR MAMMO BI INCL CAD: CPT | Mod: 26,,, | Performed by: RADIOLOGY

## 2021-12-07 PROCEDURE — 99999 PR PBB SHADOW E&M-EST. PATIENT-LVL III: ICD-10-PCS | Mod: PBBFAC,,, | Performed by: STUDENT IN AN ORGANIZED HEALTH CARE EDUCATION/TRAINING PROGRAM

## 2021-12-07 PROCEDURE — 99396 PR PREVENTIVE VISIT,EST,40-64: ICD-10-PCS | Mod: S$GLB,,, | Performed by: STUDENT IN AN ORGANIZED HEALTH CARE EDUCATION/TRAINING PROGRAM

## 2021-12-07 PROCEDURE — 99396 PREV VISIT EST AGE 40-64: CPT | Mod: S$GLB,,, | Performed by: STUDENT IN AN ORGANIZED HEALTH CARE EDUCATION/TRAINING PROGRAM

## 2021-12-29 ENCOUNTER — OFFICE VISIT (OUTPATIENT)
Dept: URGENT CARE | Facility: CLINIC | Age: 51
End: 2021-12-29
Payer: COMMERCIAL

## 2021-12-29 VITALS
RESPIRATION RATE: 18 BRPM | OXYGEN SATURATION: 98 % | HEIGHT: 64 IN | WEIGHT: 195 LBS | DIASTOLIC BLOOD PRESSURE: 83 MMHG | BODY MASS INDEX: 33.29 KG/M2 | HEART RATE: 86 BPM | SYSTOLIC BLOOD PRESSURE: 134 MMHG | TEMPERATURE: 98 F

## 2021-12-29 DIAGNOSIS — R05.9 COUGH: Primary | ICD-10-CM

## 2021-12-29 LAB
CTP QC/QA: YES
SARS-COV-2 RDRP RESP QL NAA+PROBE: NEGATIVE

## 2021-12-29 PROCEDURE — 1159F PR MEDICATION LIST DOCUMENTED IN MEDICAL RECORD: ICD-10-PCS | Mod: CPTII,S$GLB,, | Performed by: NURSE PRACTITIONER

## 2021-12-29 PROCEDURE — 99213 OFFICE O/P EST LOW 20 MIN: CPT | Mod: S$GLB,,, | Performed by: NURSE PRACTITIONER

## 2021-12-29 PROCEDURE — 3075F SYST BP GE 130 - 139MM HG: CPT | Mod: CPTII,S$GLB,, | Performed by: NURSE PRACTITIONER

## 2021-12-29 PROCEDURE — 1160F RVW MEDS BY RX/DR IN RCRD: CPT | Mod: CPTII,S$GLB,, | Performed by: NURSE PRACTITIONER

## 2021-12-29 PROCEDURE — 3008F PR BODY MASS INDEX (BMI) DOCUMENTED: ICD-10-PCS | Mod: CPTII,S$GLB,, | Performed by: NURSE PRACTITIONER

## 2021-12-29 PROCEDURE — 1159F MED LIST DOCD IN RCRD: CPT | Mod: CPTII,S$GLB,, | Performed by: NURSE PRACTITIONER

## 2021-12-29 PROCEDURE — 1160F PR REVIEW ALL MEDS BY PRESCRIBER/CLIN PHARMACIST DOCUMENTED: ICD-10-PCS | Mod: CPTII,S$GLB,, | Performed by: NURSE PRACTITIONER

## 2021-12-29 PROCEDURE — U0002: ICD-10-PCS | Mod: QW,S$GLB,, | Performed by: NURSE PRACTITIONER

## 2021-12-29 PROCEDURE — 3075F PR MOST RECENT SYSTOLIC BLOOD PRESS GE 130-139MM HG: ICD-10-PCS | Mod: CPTII,S$GLB,, | Performed by: NURSE PRACTITIONER

## 2021-12-29 PROCEDURE — 99213 PR OFFICE/OUTPT VISIT, EST, LEVL III, 20-29 MIN: ICD-10-PCS | Mod: S$GLB,,, | Performed by: NURSE PRACTITIONER

## 2021-12-29 PROCEDURE — 3079F PR MOST RECENT DIASTOLIC BLOOD PRESSURE 80-89 MM HG: ICD-10-PCS | Mod: CPTII,S$GLB,, | Performed by: NURSE PRACTITIONER

## 2021-12-29 PROCEDURE — 3008F BODY MASS INDEX DOCD: CPT | Mod: CPTII,S$GLB,, | Performed by: NURSE PRACTITIONER

## 2021-12-29 PROCEDURE — U0002 COVID-19 LAB TEST NON-CDC: HCPCS | Mod: QW,S$GLB,, | Performed by: NURSE PRACTITIONER

## 2021-12-29 PROCEDURE — 3079F DIAST BP 80-89 MM HG: CPT | Mod: CPTII,S$GLB,, | Performed by: NURSE PRACTITIONER

## 2022-01-04 DIAGNOSIS — J30.89 PERENNIAL ALLERGIC RHINITIS: ICD-10-CM

## 2022-01-04 RX ORDER — FLUTICASONE PROPIONATE 50 MCG
SPRAY, SUSPENSION (ML) NASAL
Qty: 48 G | Refills: 2 | Status: SHIPPED | OUTPATIENT
Start: 2022-01-04 | End: 2022-12-13

## 2022-01-04 NOTE — TELEPHONE ENCOUNTER
Refill Authorization Note   Gisella Bennett  is requesting a refill authorization.  Brief Assessment and Rationale for Refill:  Approve     Medication Therapy Plan:       Medication Reconciliation Completed: No   Comments:   --->Care Gap information included below if applicable.   Orders Placed This Encounter    fluticasone propionate (FLONASE) 50 mcg/actuation nasal spray      Requested Prescriptions   Signed Prescriptions Disp Refills    fluticasone propionate (FLONASE) 50 mcg/actuation nasal spray 48 g 2     Sig: SHAKE LIQUID AND USE 2 SPRAYS(100 MCG) IN EACH NOSTRIL EVERY DAY       Ear, Nose, and Throat: Nasal Preparations - Corticosteroids Passed - 1/4/2022 12:11 PM        Passed - Patient is at least 18 years old        Passed - Valid encounter within last 15 months     Recent Visits  Date Type Provider Dept   09/24/21 Office Visit Molly Frias MD [facility]   06/14/21 Office Visit Molly Frias MD [facility]   12/10/20 Office Visit Molly Frias MD [facility]   Showing recent visits within past 720 days and meeting all other requirements  Future Appointments  No visits were found meeting these conditions.  Showing future appointments within next 150 days and meeting all other requirements      Future Appointments              In 1 month Molly Frias MD [facility], [place]                    Appointments  past 12m or future 3m with PCP    Date Provider   Last Visit   9/24/2021 Molly Frias MD   Next Visit   2/18/2022 Molly Frias MD   ED visits in past 90 days: 0     Note composed:3:17 PM 01/04/2022

## 2022-01-04 NOTE — TELEPHONE ENCOUNTER
No new care gaps identified.  Powered by [employer]. Reference number: [id number].   1/04/2022 12:12:16 PM CST

## 2022-01-18 ENCOUNTER — PATIENT MESSAGE (OUTPATIENT)
Dept: ADMINISTRATIVE | Facility: HOSPITAL | Age: 52
End: 2022-01-18
Payer: COMMERCIAL

## 2022-01-24 ENCOUNTER — PATIENT MESSAGE (OUTPATIENT)
Dept: OBSTETRICS AND GYNECOLOGY | Facility: CLINIC | Age: 52
End: 2022-01-24
Payer: COMMERCIAL

## 2022-01-26 DIAGNOSIS — N95.1 MENOPAUSAL SYMPTOM: Primary | ICD-10-CM

## 2022-01-26 RX ORDER — MEDROXYPROGESTERONE ACETATE 10 MG/1
10 TABLET ORAL DAILY
Qty: 10 TABLET | Refills: 11 | Status: SHIPPED | OUTPATIENT
Start: 2022-01-26 | End: 2022-12-14

## 2022-01-31 DIAGNOSIS — I10 ESSENTIAL HYPERTENSION: ICD-10-CM

## 2022-01-31 RX ORDER — AMLODIPINE BESYLATE 5 MG/1
TABLET ORAL
Qty: 90 TABLET | Refills: 2 | Status: SHIPPED | OUTPATIENT
Start: 2022-01-31 | End: 2022-11-28

## 2022-01-31 NOTE — TELEPHONE ENCOUNTER
Refill Authorization Note   Gisella Bennett  is requesting a refill authorization.  Brief Assessment and Rationale for Refill:  Approve     Medication Therapy Plan:       Medication Reconciliation Completed: No   Comments:   --->Care Gap information included below if applicable.   Orders Placed This Encounter    amLODIPine (NORVASC) 5 MG tablet      Requested Prescriptions   Signed Prescriptions Disp Refills    amLODIPine (NORVASC) 5 MG tablet 90 tablet 2     Sig: TAKE 1 TABLET(5 MG) BY MOUTH EVERY DAY       Cardiovascular:  Calcium Channel Blockers Passed - 1/31/2022 12:25 PM        Passed - Patient is at least 18 years old        Passed - Last BP in normal range within 360 days     BP Readings from Last 1 Encounters:   12/29/21 134/83               Passed - Valid encounter within last 15 months     Recent Visits  Date Type Provider Dept   09/24/21 Office Visit Molly Frias MD [facility]   06/14/21 Office Visit Molly Frias MD [facility]   12/10/20 Office Visit Molly Frias MD [facility]   Showing recent visits within past 720 days and meeting all other requirements  Future Appointments  No visits were found meeting these conditions.  Showing future appointments within next 150 days and meeting all other requirements      Future Appointments              In 2 weeks Molly Frias MD [facility] - [facility], [place]                    Appointments  past 12m or future 3m with PCP    Date Provider   Last Visit   9/24/2021 Molly Frias MD   Next Visit   2/18/2022 Molly Frias MD   ED visits in past 90 days: 0     Note composed:12:45 PM 01/31/2022

## 2022-01-31 NOTE — TELEPHONE ENCOUNTER
Care Due:                  Date            Visit Type   Department     Provider  --------------------------------------------------------------------------------                                             [facility]   Molly Edgar  Last Visit: 09-      None         [facility]           Rodriguez                                           [facility]  Next Visit: 02-      [facility]                                                            Last  Test          Frequency    Reason                     Performed    Due Date  --------------------------------------------------------------------------------    CMP.........  12 months..  hydroCHLOROthiazide......  Not Found    Overdue    Powered by [employer]. Reference number: [id number].   1/31/2022 12:26:07 PM CST

## 2022-02-24 ENCOUNTER — OFFICE VISIT (OUTPATIENT)
Dept: PRIMARY CARE CLINIC | Facility: CLINIC | Age: 52
End: 2022-02-24
Payer: COMMERCIAL

## 2022-02-24 VITALS
HEIGHT: 64 IN | OXYGEN SATURATION: 98 % | TEMPERATURE: 99 F | DIASTOLIC BLOOD PRESSURE: 84 MMHG | WEIGHT: 205.5 LBS | BODY MASS INDEX: 35.08 KG/M2 | SYSTOLIC BLOOD PRESSURE: 126 MMHG | HEART RATE: 90 BPM

## 2022-02-24 DIAGNOSIS — Z00.00 ANNUAL PHYSICAL EXAM: Primary | ICD-10-CM

## 2022-02-24 DIAGNOSIS — Z12.11 SCREENING FOR COLON CANCER: ICD-10-CM

## 2022-02-24 DIAGNOSIS — I10 ESSENTIAL HYPERTENSION: ICD-10-CM

## 2022-02-24 DIAGNOSIS — D64.9 ANEMIA, UNSPECIFIED TYPE: ICD-10-CM

## 2022-02-24 PROCEDURE — 1160F PR REVIEW ALL MEDS BY PRESCRIBER/CLIN PHARMACIST DOCUMENTED: ICD-10-PCS | Mod: CPTII,S$GLB,, | Performed by: FAMILY MEDICINE

## 2022-02-24 PROCEDURE — 99396 PR PREVENTIVE VISIT,EST,40-64: ICD-10-PCS | Mod: S$GLB,,, | Performed by: FAMILY MEDICINE

## 2022-02-24 PROCEDURE — 3008F BODY MASS INDEX DOCD: CPT | Mod: CPTII,S$GLB,, | Performed by: FAMILY MEDICINE

## 2022-02-24 PROCEDURE — 3074F PR MOST RECENT SYSTOLIC BLOOD PRESSURE < 130 MM HG: ICD-10-PCS | Mod: CPTII,S$GLB,, | Performed by: FAMILY MEDICINE

## 2022-02-24 PROCEDURE — 99396 PREV VISIT EST AGE 40-64: CPT | Mod: S$GLB,,, | Performed by: FAMILY MEDICINE

## 2022-02-24 PROCEDURE — 99999 PR PBB SHADOW E&M-EST. PATIENT-LVL IV: ICD-10-PCS | Mod: PBBFAC,,, | Performed by: FAMILY MEDICINE

## 2022-02-24 PROCEDURE — 3079F PR MOST RECENT DIASTOLIC BLOOD PRESSURE 80-89 MM HG: ICD-10-PCS | Mod: CPTII,S$GLB,, | Performed by: FAMILY MEDICINE

## 2022-02-24 PROCEDURE — 3074F SYST BP LT 130 MM HG: CPT | Mod: CPTII,S$GLB,, | Performed by: FAMILY MEDICINE

## 2022-02-24 PROCEDURE — 3079F DIAST BP 80-89 MM HG: CPT | Mod: CPTII,S$GLB,, | Performed by: FAMILY MEDICINE

## 2022-02-24 PROCEDURE — 99999 PR PBB SHADOW E&M-EST. PATIENT-LVL IV: CPT | Mod: PBBFAC,,, | Performed by: FAMILY MEDICINE

## 2022-02-24 PROCEDURE — 1160F RVW MEDS BY RX/DR IN RCRD: CPT | Mod: CPTII,S$GLB,, | Performed by: FAMILY MEDICINE

## 2022-02-24 PROCEDURE — 1159F PR MEDICATION LIST DOCUMENTED IN MEDICAL RECORD: ICD-10-PCS | Mod: CPTII,S$GLB,, | Performed by: FAMILY MEDICINE

## 2022-02-24 PROCEDURE — 3008F PR BODY MASS INDEX (BMI) DOCUMENTED: ICD-10-PCS | Mod: CPTII,S$GLB,, | Performed by: FAMILY MEDICINE

## 2022-02-24 PROCEDURE — 1159F MED LIST DOCD IN RCRD: CPT | Mod: CPTII,S$GLB,, | Performed by: FAMILY MEDICINE

## 2022-02-24 RX ORDER — ESTRADIOL/NORETHINDRONE ACETATE TRANSDERMAL SYSTEM .05; .14 MG/D; MG/D
PATCH, EXTENDED RELEASE TRANSDERMAL
COMMUNITY
Start: 2022-01-27 | End: 2022-02-24 | Stop reason: ALTCHOICE

## 2022-02-24 NOTE — PATIENT INSTRUCTIONS
Get fasting labs  Get cologuard  Ok to stop estrogen and progesterone, followup with GYN for bloodwork.  Shingles vaccine today and return for second in 1-2 months.

## 2022-02-24 NOTE — PROGRESS NOTES
[facility] Note    Chief Complaint      Chief Complaint   Patient presents with    Annual Exam       History of Present Illness      Gisella Bennett is a 51 y.o. 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  Has increased carb intake since COVID but working to restrict.   Water- 40-80 daily  No tob, occ etoh                 Patient Care Team:  Molly Frias MD as PCP - General (Internal Medicine)  Saniya Chao LPN as Care Coordinator (Internal Medicine)     Health Maintenance:  Immunization History   Administered Date(s) Administered    COVID-19, MRNA, LN-S, PF (Pfizer) (Purple Cap) 03/13/2021, 04/03/2021, 11/13/2021    Tdap 12/10/2020    Zoster Recombinant 02/25/2022      Health Maintenance   Topic Date Due    Mammogram  12/07/2022    Lipid Panel  02/25/2027    TETANUS VACCINE  12/10/2030    Hepatitis C Screening  Completed        Past Medical History:  Past Medical History:   Diagnosis Date    Acute iritis     Atopic dermatitis     Environmental allergies 3/29/2016    Hypertension     Lower back pain 3/29/2016    Trigeminal neuralgia 4/14/2015    Uveitis of right eye 2003, 2018    Vitamin D deficiency disease 2/16/2015       Past Surgical History:   has a past surgical history that includes Intrauterine device insertion (2013); Spinal fusion (04/2013); Bunionectomy (Left); Laparoscopic cholecystectomy (N/A, 5/13/2021); and Cholecystectomy.    Family History:  family history includes Breast cancer in her maternal aunt; Diabetes in her father; Diabetes Mellitus in her father; Eczema in her brother;  Heart disease (age of onset: 56) in her father; Hypertension in her mother; Kidney disease in her father; No Known Problems in her maternal grandfather, maternal grandmother, paternal grandfather, and paternal grandmother; Sarcoidosis (age of onset: 55) in her cousin; Thyroid disease in her mother.     Social History:  Social History     Tobacco Use    Smoking status: Never Smoker    Smokeless tobacco: Never Used    Tobacco comment: [employer];     Substance Use Topics    Alcohol use: Yes     Comment: monthly    Drug use: No     Comment: CBD cream       Review of Systems   Constitutional: Negative for fever.   Respiratory: Negative for shortness of breath.    Cardiovascular: Negative for chest pain.   Gastrointestinal: Negative for change in bowel habit and change in bowel habit.   Genitourinary: Negative for difficulty urinating.        Medications:    Current Outpatient Medications:     amLODIPine (NORVASC) 5 MG tablet, TAKE 1 TABLET(5 MG) BY MOUTH EVERY DAY, Disp: 90 tablet, Rfl: 2    azelastine (ASTELIN) 137 mcg (0.1 %) nasal spray, 1 spray (137 mcg total) by Nasal route 2 (two) times daily as needed for Rhinitis., Disp: 30 mL, Rfl: 11    EPIPEN 2-CHIDI 0.3 mg/0.3 mL (1:1,000) AtIn, , Disp: , Rfl:     estrogens, conjugated, (PREMARIN) 0.3 MG tablet, Take 1 tablet (0.3 mg total) by mouth once daily., Disp: 30 tablet, Rfl: 11    fexofenadine (ALLEGRA) 180 MG tablet, Take 1 tablet (180 mg total) by mouth once daily., Disp: 90 tablet, Rfl: 3    fluticasone propionate (FLONASE) 50 mcg/actuation nasal spray, SHAKE LIQUID AND USE 2 SPRAYS(100 MCG) IN EACH NOSTRIL EVERY DAY, Disp: 48 g, Rfl: 2    hydroCHLOROthiazide (MICROZIDE) 12.5 mg capsule, Take 1 capsule (12.5 mg total) by mouth once daily., Disp: 90 capsule, Rfl: 1    medroxyPROGESTERone (PROVERA) 10 MG tablet, Take 1 tablet (10 mg total) by mouth once daily. Take one tablet for the first ten days of each month, Disp: 10 tablet, Rfl: 11     "montelukast (SINGULAIR) 10 mg tablet, Take 1 tablet (10 mg total) by mouth once daily., Disp: 90 tablet, Rfl: 1    omega-3 fatty acids/fish oil (FISH OIL-OMEGA-3 FATTY ACIDS) 300-1,000 mg capsule, Take by mouth once daily., Disp: , Rfl:     lifitegrast (XIIDRA) 5 % Dpet, Apply 1 drop to eye every 12 (twelve) hours. (Patient not taking: Reported on 2/24/2022), Disp: 180 each, Rfl: 4     Allergies:  Review of patient's allergies indicates:   Allergen Reactions    Codeine Other (See Comments)     Tremors    Nuts [tree nut] Anaphylaxis    Avocado (laurus persea) Itching, Nausea And Vomiting and Other (See Comments)       Physical Exam      Vital Signs  Temp: 98.6 °F (37 °C)  Temp src: Oral  Pulse: 90  SpO2: 98 %  BP: 126/84  Pain Score: 0-No pain  Height and Weight  Height: 5' 4" (162.6 cm)  Weight: 93.2 kg (205 lb 7.5 oz)  BSA (Calculated - sq m): 2.05 sq meters  BMI (Calculated): 35.3  Weight in (lb) to have BMI = 25: 145.3             Physical Exam  Vitals reviewed.   Constitutional:       General: She is not in acute distress.     Appearance: Normal appearance.   HENT:      Right Ear: Tympanic membrane, ear canal and external ear normal.      Left Ear: Tympanic membrane, ear canal and external ear normal.      Mouth/Throat:      Mouth: Mucous membranes are moist.      Pharynx: Oropharynx is clear. No oropharyngeal exudate or posterior oropharyngeal erythema.   Eyes:      Extraocular Movements: Extraocular movements intact.      Conjunctiva/sclera: Conjunctivae normal.      Pupils: Pupils are equal, round, and reactive to light.   Cardiovascular:      Rate and Rhythm: Normal rate and regular rhythm.      Pulses: Normal pulses.      Heart sounds: No murmur heard.    No friction rub. No gallop.   Pulmonary:      Effort: Pulmonary effort is normal.      Breath sounds: No wheezing, rhonchi or rales.   Abdominal:      General: Abdomen is flat. Bowel sounds are normal. There is no distension.      Palpations: Abdomen " is soft. There is no mass.      Tenderness: There is no abdominal tenderness.   Musculoskeletal:      Cervical back: Neck supple.      Right lower leg: No edema.      Left lower leg: No edema.   Lymphadenopathy:      Cervical: No cervical adenopathy.   Skin:     General: Skin is warm and dry.   Neurological:      General: No focal deficit present.      Mental Status: She is alert.   Psychiatric:         Mood and Affect: Mood normal.         Behavior: Behavior normal.          Laboratory:  CBC:  Recent Labs   Lab 12/10/20  0820 04/27/21  1100 02/25/22  0755   WBC 6.26 6.93 5.85   RBC 4.27 4.32 4.43   Hemoglobin 11.3 L 11.8 L 12.2   Hematocrit 37.4 37.1 38.6   Platelets 421 H 367 381   MCV 88 86 87   MCH 26.5 L 27.3 27.5   MCHC 30.2 L 31.8 L 31.6 L       CMP:  Recent Labs   Lab 12/10/20  0820 04/27/21  1100 02/25/22  0755   Glucose 94   < > 82   Calcium 9.5   < > 9.8   Albumin 3.4 L  --  3.5   Total Protein 8.0  --  7.7   Sodium 137   < > 137   Potassium 4.1   < > 3.9   CO2 28   < > 25   Chloride 101   < > 102   BUN 13   < > 13   Creatinine 0.9   < > 0.8   Alkaline Phosphatase 95  --  96   ALT 26  --  22   AST 21  --  23   Total Bilirubin 0.4  --  0.5    < > = values in this interval not displayed.           URINALYSIS:  Recent Labs   Lab 12/10/20  0958 09/24/21  0858   Color, UA Yellow Yellow   Clarity, UA  --  Cloudy   Specific Gravity, UA 1.020  --    Spec Grav UA  --  1.020   pH, UA 6.0 5   Protein, UA Negative  --    Bacteria Occasional  --    Nitrite, UA Negative neg   Leukocytes, UA Negative  --    Urobilinogen, UA  --  normal        LIPIDS:  Recent Labs   Lab 12/10/20  0820 02/25/22  0755    H 87 H   Cholesterol 219 H 207 H   Triglycerides 91 69   LDL Cholesterol 96.8 106.2   HDL/Cholesterol Ratio 47.5 42.0   Non-HDL Cholesterol 115 120   Total Cholesterol/HDL Ratio 2.1 2.4       TSH:        A1C:  Recent Labs   Lab 12/10/20  0820   Hemoglobin A1C 5.4       Urine Microalbumin/Cr:          Other:   Recent  Labs   Lab 12/30/19  1230 12/10/20  0820 02/25/22  0755   Follicle Stimulating Hormone 12.00  --   --    Vit D, 25-Hydroxy  --  >155 H  --    Vitamin B-12  --   --  >2000 H   Ferritin  --   --  66   Iron  --   --  58   Transferrin  --   --  297   TIBC  --   --  440   Saturated Iron  --   --  13 L     Recent Labs   Lab 12/10/20  0820   Hepatitis C Ab Negative       Assessment/Plan     Gisella Bennett is a 51 y.o.female with:    Annual physical exam  -     CBC Auto Differential; Future; Expected date: 02/24/2022  -     Comprehensive Metabolic Panel; Future; Expected date: 02/24/2022  -     Lipid Panel; Future; Expected date: 02/24/2022  Physical activity, diet, healthy lifestyle, alcohol, tobacco, screenings and immunizations discussed.    Screening for colon cancer  -     Cologuard Screening (Multitarget Stool DNA); Future; Expected date: 02/24/2022    Essential hypertension  -     CBC Auto Differential; Future; Expected date: 02/24/2022  -     Comprehensive Metabolic Panel; Future; Expected date: 02/24/2022  - stable, continue current medication    Anemia, unspecified type  -     Iron and TIBC; Future; Expected date: 02/24/2022  -     Ferritin; Future; Expected date: 02/24/2022  -     Vitamin B12; Future; Expected date: 02/24/2022  -     Folate; Future; Expected date: 02/24/2022  - stable, check lab         Chronic conditions status updated as per HPI.  Other than changes above, cont current medications and maintain follow up with specialists.  Return to clinic in Follow up in about 6 months (around 8/24/2022).      Lissy Hightower MD  [facility]

## 2022-02-25 ENCOUNTER — PATIENT MESSAGE (OUTPATIENT)
Dept: PHARMACY | Facility: CLINIC | Age: 52
End: 2022-02-25
Payer: COMMERCIAL

## 2022-02-25 ENCOUNTER — LAB VISIT (OUTPATIENT)
Dept: LAB | Facility: HOSPITAL | Age: 52
End: 2022-02-25
Attending: FAMILY MEDICINE
Payer: COMMERCIAL

## 2022-02-25 DIAGNOSIS — D64.9 ANEMIA, UNSPECIFIED TYPE: ICD-10-CM

## 2022-02-25 DIAGNOSIS — I10 ESSENTIAL HYPERTENSION: ICD-10-CM

## 2022-02-25 DIAGNOSIS — Z00.00 ANNUAL PHYSICAL EXAM: ICD-10-CM

## 2022-02-25 LAB
ALBUMIN SERPL BCP-MCNC: 3.5 G/DL (ref 3.5–5.2)
ALP SERPL-CCNC: 96 U/L (ref 55–135)
ALT SERPL W/O P-5'-P-CCNC: 22 U/L (ref 10–44)
ANION GAP SERPL CALC-SCNC: 10 MMOL/L (ref 8–16)
AST SERPL-CCNC: 23 U/L (ref 10–40)
BASOPHILS # BLD AUTO: 0.08 K/UL (ref 0–0.2)
BASOPHILS NFR BLD: 1.4 % (ref 0–1.9)
BILIRUB SERPL-MCNC: 0.5 MG/DL (ref 0.1–1)
BUN SERPL-MCNC: 13 MG/DL (ref 6–20)
CALCIUM SERPL-MCNC: 9.8 MG/DL (ref 8.7–10.5)
CHLORIDE SERPL-SCNC: 102 MMOL/L (ref 95–110)
CHOLEST SERPL-MCNC: 207 MG/DL (ref 120–199)
CHOLEST/HDLC SERPL: 2.4 {RATIO} (ref 2–5)
CO2 SERPL-SCNC: 25 MMOL/L (ref 23–29)
CREAT SERPL-MCNC: 0.8 MG/DL (ref 0.5–1.4)
DIFFERENTIAL METHOD: ABNORMAL
EOSINOPHIL # BLD AUTO: 0.1 K/UL (ref 0–0.5)
EOSINOPHIL NFR BLD: 2.4 % (ref 0–8)
ERYTHROCYTE [DISTWIDTH] IN BLOOD BY AUTOMATED COUNT: 14.3 % (ref 11.5–14.5)
EST. GFR  (AFRICAN AMERICAN): >60 ML/MIN/1.73 M^2
EST. GFR  (NON AFRICAN AMERICAN): >60 ML/MIN/1.73 M^2
FERRITIN SERPL-MCNC: 66 NG/ML (ref 20–300)
FOLATE SERPL-MCNC: 18.1 NG/ML (ref 4–24)
GLUCOSE SERPL-MCNC: 82 MG/DL (ref 70–110)
HCT VFR BLD AUTO: 38.6 % (ref 37–48.5)
HDLC SERPL-MCNC: 87 MG/DL (ref 40–75)
HDLC SERPL: 42 % (ref 20–50)
HGB BLD-MCNC: 12.2 G/DL (ref 12–16)
IMM GRANULOCYTES # BLD AUTO: 0.01 K/UL (ref 0–0.04)
IMM GRANULOCYTES NFR BLD AUTO: 0.2 % (ref 0–0.5)
IRON SERPL-MCNC: 58 UG/DL (ref 30–160)
LDLC SERPL CALC-MCNC: 106.2 MG/DL (ref 63–159)
LYMPHOCYTES # BLD AUTO: 2.4 K/UL (ref 1–4.8)
LYMPHOCYTES NFR BLD: 41.2 % (ref 18–48)
MCH RBC QN AUTO: 27.5 PG (ref 27–31)
MCHC RBC AUTO-ENTMCNC: 31.6 G/DL (ref 32–36)
MCV RBC AUTO: 87 FL (ref 82–98)
MONOCYTES # BLD AUTO: 0.4 K/UL (ref 0.3–1)
MONOCYTES NFR BLD: 6.8 % (ref 4–15)
NEUTROPHILS # BLD AUTO: 2.8 K/UL (ref 1.8–7.7)
NEUTROPHILS NFR BLD: 48 % (ref 38–73)
NONHDLC SERPL-MCNC: 120 MG/DL
NRBC BLD-RTO: 0 /100 WBC
PLATELET # BLD AUTO: 381 K/UL (ref 150–450)
PMV BLD AUTO: 10.4 FL (ref 9.2–12.9)
POTASSIUM SERPL-SCNC: 3.9 MMOL/L (ref 3.5–5.1)
PROT SERPL-MCNC: 7.7 G/DL (ref 6–8.4)
RBC # BLD AUTO: 4.43 M/UL (ref 4–5.4)
SATURATED IRON: 13 % (ref 20–50)
SODIUM SERPL-SCNC: 137 MMOL/L (ref 136–145)
TOTAL IRON BINDING CAPACITY: 440 UG/DL (ref 250–450)
TRANSFERRIN SERPL-MCNC: 297 MG/DL (ref 200–375)
TRIGL SERPL-MCNC: 69 MG/DL (ref 30–150)
VIT B12 SERPL-MCNC: >2000 PG/ML (ref 210–950)
WBC # BLD AUTO: 5.85 K/UL (ref 3.9–12.7)

## 2022-02-25 PROCEDURE — 80061 LIPID PANEL: CPT | Performed by: FAMILY MEDICINE

## 2022-02-25 PROCEDURE — 82607 VITAMIN B-12: CPT | Performed by: FAMILY MEDICINE

## 2022-02-25 PROCEDURE — 36415 COLL VENOUS BLD VENIPUNCTURE: CPT | Mod: PN | Performed by: FAMILY MEDICINE

## 2022-02-25 PROCEDURE — 82746 ASSAY OF FOLIC ACID SERUM: CPT | Performed by: FAMILY MEDICINE

## 2022-02-25 PROCEDURE — 82728 ASSAY OF FERRITIN: CPT | Performed by: FAMILY MEDICINE

## 2022-02-25 PROCEDURE — 85025 COMPLETE CBC W/AUTO DIFF WBC: CPT | Performed by: FAMILY MEDICINE

## 2022-02-25 PROCEDURE — 84466 ASSAY OF TRANSFERRIN: CPT | Performed by: FAMILY MEDICINE

## 2022-02-25 PROCEDURE — 80053 COMPREHEN METABOLIC PANEL: CPT | Performed by: FAMILY MEDICINE

## 2022-03-13 DIAGNOSIS — Z12.11 COLON CANCER SCREENING: ICD-10-CM

## 2022-03-16 LAB — NONINV COLON CA DNA+OCC BLD SCRN STL QL: NEGATIVE

## 2022-03-24 ENCOUNTER — OFFICE VISIT (OUTPATIENT)
Dept: PRIMARY CARE CLINIC | Facility: CLINIC | Age: 52
End: 2022-03-24
Payer: COMMERCIAL

## 2022-03-24 ENCOUNTER — LAB VISIT (OUTPATIENT)
Dept: LAB | Facility: HOSPITAL | Age: 52
End: 2022-03-24
Attending: FAMILY MEDICINE
Payer: COMMERCIAL

## 2022-03-24 ENCOUNTER — PATIENT MESSAGE (OUTPATIENT)
Dept: RHEUMATOLOGY | Facility: CLINIC | Age: 52
End: 2022-03-24
Payer: COMMERCIAL

## 2022-03-24 VITALS
TEMPERATURE: 98 F | RESPIRATION RATE: 20 BRPM | HEART RATE: 88 BPM | SYSTOLIC BLOOD PRESSURE: 106 MMHG | BODY MASS INDEX: 35.9 KG/M2 | WEIGHT: 210.31 LBS | HEIGHT: 64 IN | DIASTOLIC BLOOD PRESSURE: 80 MMHG | OXYGEN SATURATION: 99 %

## 2022-03-24 DIAGNOSIS — M25.542 PAIN IN JOINT OF LEFT HAND: ICD-10-CM

## 2022-03-24 DIAGNOSIS — M25.542 PAIN IN JOINT OF LEFT HAND: Primary | ICD-10-CM

## 2022-03-24 LAB
BASOPHILS # BLD AUTO: 0.09 K/UL (ref 0–0.2)
BASOPHILS NFR BLD: 1.5 % (ref 0–1.9)
CRP SERPL-MCNC: 27.4 MG/L (ref 0–8.2)
DIFFERENTIAL METHOD: ABNORMAL
EOSINOPHIL # BLD AUTO: 0.1 K/UL (ref 0–0.5)
EOSINOPHIL NFR BLD: 2.4 % (ref 0–8)
ERYTHROCYTE [DISTWIDTH] IN BLOOD BY AUTOMATED COUNT: 14.5 % (ref 11.5–14.5)
HCT VFR BLD AUTO: 37.1 % (ref 37–48.5)
HGB BLD-MCNC: 11.8 G/DL (ref 12–16)
IMM GRANULOCYTES # BLD AUTO: 0.01 K/UL (ref 0–0.04)
IMM GRANULOCYTES NFR BLD AUTO: 0.2 % (ref 0–0.5)
LYMPHOCYTES # BLD AUTO: 2.4 K/UL (ref 1–4.8)
LYMPHOCYTES NFR BLD: 40.6 % (ref 18–48)
MCH RBC QN AUTO: 28.8 PG (ref 27–31)
MCHC RBC AUTO-ENTMCNC: 31.8 G/DL (ref 32–36)
MCV RBC AUTO: 91 FL (ref 82–98)
MONOCYTES # BLD AUTO: 0.5 K/UL (ref 0.3–1)
MONOCYTES NFR BLD: 8.5 % (ref 4–15)
NEUTROPHILS # BLD AUTO: 2.7 K/UL (ref 1.8–7.7)
NEUTROPHILS NFR BLD: 46.8 % (ref 38–73)
NRBC BLD-RTO: 0 /100 WBC
PLATELET # BLD AUTO: 290 K/UL (ref 150–450)
PMV BLD AUTO: 11.1 FL (ref 9.2–12.9)
RBC # BLD AUTO: 4.1 M/UL (ref 4–5.4)
URATE SERPL-MCNC: 5.1 MG/DL (ref 2.4–5.7)
WBC # BLD AUTO: 5.86 K/UL (ref 3.9–12.7)

## 2022-03-24 PROCEDURE — 86140 C-REACTIVE PROTEIN: CPT | Performed by: FAMILY MEDICINE

## 2022-03-24 PROCEDURE — 84550 ASSAY OF BLOOD/URIC ACID: CPT | Performed by: FAMILY MEDICINE

## 2022-03-24 PROCEDURE — 99213 OFFICE O/P EST LOW 20 MIN: CPT | Mod: S$GLB,,, | Performed by: FAMILY MEDICINE

## 2022-03-24 PROCEDURE — 3008F BODY MASS INDEX DOCD: CPT | Mod: CPTII,S$GLB,, | Performed by: FAMILY MEDICINE

## 2022-03-24 PROCEDURE — 3079F DIAST BP 80-89 MM HG: CPT | Mod: CPTII,S$GLB,, | Performed by: FAMILY MEDICINE

## 2022-03-24 PROCEDURE — 36415 COLL VENOUS BLD VENIPUNCTURE: CPT | Mod: PN | Performed by: FAMILY MEDICINE

## 2022-03-24 PROCEDURE — 99999 PR PBB SHADOW E&M-EST. PATIENT-LVL V: CPT | Mod: PBBFAC,,, | Performed by: FAMILY MEDICINE

## 2022-03-24 PROCEDURE — 1159F MED LIST DOCD IN RCRD: CPT | Mod: CPTII,S$GLB,, | Performed by: FAMILY MEDICINE

## 2022-03-24 PROCEDURE — 1159F PR MEDICATION LIST DOCUMENTED IN MEDICAL RECORD: ICD-10-PCS | Mod: CPTII,S$GLB,, | Performed by: FAMILY MEDICINE

## 2022-03-24 PROCEDURE — 3079F PR MOST RECENT DIASTOLIC BLOOD PRESSURE 80-89 MM HG: ICD-10-PCS | Mod: CPTII,S$GLB,, | Performed by: FAMILY MEDICINE

## 2022-03-24 PROCEDURE — 3074F SYST BP LT 130 MM HG: CPT | Mod: CPTII,S$GLB,, | Performed by: FAMILY MEDICINE

## 2022-03-24 PROCEDURE — 85025 COMPLETE CBC W/AUTO DIFF WBC: CPT | Performed by: FAMILY MEDICINE

## 2022-03-24 PROCEDURE — 1160F PR REVIEW ALL MEDS BY PRESCRIBER/CLIN PHARMACIST DOCUMENTED: ICD-10-PCS | Mod: CPTII,S$GLB,, | Performed by: FAMILY MEDICINE

## 2022-03-24 PROCEDURE — 3008F PR BODY MASS INDEX (BMI) DOCUMENTED: ICD-10-PCS | Mod: CPTII,S$GLB,, | Performed by: FAMILY MEDICINE

## 2022-03-24 PROCEDURE — 99999 PR PBB SHADOW E&M-EST. PATIENT-LVL V: ICD-10-PCS | Mod: PBBFAC,,, | Performed by: FAMILY MEDICINE

## 2022-03-24 PROCEDURE — 99213 PR OFFICE/OUTPT VISIT, EST, LEVL III, 20-29 MIN: ICD-10-PCS | Mod: S$GLB,,, | Performed by: FAMILY MEDICINE

## 2022-03-24 PROCEDURE — 1160F RVW MEDS BY RX/DR IN RCRD: CPT | Mod: CPTII,S$GLB,, | Performed by: FAMILY MEDICINE

## 2022-03-24 PROCEDURE — 3074F PR MOST RECENT SYSTOLIC BLOOD PRESSURE < 130 MM HG: ICD-10-PCS | Mod: CPTII,S$GLB,, | Performed by: FAMILY MEDICINE

## 2022-03-24 RX ORDER — INDOMETHACIN 50 MG/1
50 CAPSULE ORAL 3 TIMES DAILY
Qty: 30 CAPSULE | Refills: 0 | Status: SHIPPED | OUTPATIENT
Start: 2022-03-24 | End: 2022-07-12 | Stop reason: ALTCHOICE

## 2022-03-24 NOTE — PATIENT INSTRUCTIONS
Get blood drawn today  Xray hand  Take indomethacin three times day with food for 3 days and if still in pain extend to 5 days and message with effects

## 2022-03-24 NOTE — PROGRESS NOTES
[facility] Note    Chief Complaint      Chief Complaint   Patient presents with    Wrist Pain     Right thumb       History of Present Illness      Gisella Bennett is a 51 y.o. female  who presents today for:    1 day of acute left thumb pain with associated edema and radiationto 2nd finger.  She has no injury, no heavy meal, no overuse activity, no recent illness or other systemic signs.. She is on HCTZ. She had autoimmune workup for uveitis and +AMALIA that was negative.  She reports today she is improved but still not well - swelling has decreased and she can move the thumb.       Patient Care Team:  Lissy Hightower MD as PCP - General (Internal Medicine)  Saniya Chao LPN as Care Coordinator (Internal Medicine)     Health Maintenance:  Immunization History   Administered Date(s) Administered    COVID-19, MRNA, LN-S, PF (Pfizer) (Purple Cap) 03/13/2021, 04/03/2021, 11/13/2021    Tdap 12/10/2020    Zoster Recombinant 02/25/2022      Health Maintenance   Topic Date Due    Mammogram  12/07/2022    Lipid Panel  02/25/2027    TETANUS VACCINE  12/10/2030    Hepatitis C Screening  Completed        Past Medical History:  Past Medical History:   Diagnosis Date    Acute iritis     Atopic dermatitis     Environmental allergies 3/29/2016    Hypertension     Lower back pain 3/29/2016    Trigeminal neuralgia 4/14/2015    Uveitis of right eye 2003, 2018    Vitamin D deficiency disease 2/16/2015       Past Surgical History:   has a past surgical history that includes Intrauterine device insertion (2013); Spinal fusion (04/2013); Bunionectomy (Left); Laparoscopic cholecystectomy (N/A, 5/13/2021); and Cholecystectomy.    Family History:  family history includes Breast cancer in her maternal aunt; Diabetes in her father; Diabetes Mellitus in her father; Eczema in her brother; Heart disease (age of onset: 56) in her father; Hypertension in her mother; Kidney disease in her father; No Known  Problems in her maternal grandfather, maternal grandmother, paternal grandfather, and paternal grandmother; Sarcoidosis (age of onset: 55) in her cousin; Thyroid disease in her mother.     Social History:  Social History     Tobacco Use    Smoking status: Never Smoker    Smokeless tobacco: Never Used    Tobacco comment: Philanthropy;     Substance Use Topics    Alcohol use: Yes     Comment: monthly    Drug use: No     Comment: CBD cream       Review of Systems   Constitutional: Negative for fever.   Respiratory: Negative for shortness of breath.    Cardiovascular: Negative for chest pain.   Gastrointestinal: Negative for change in bowel habit and change in bowel habit.   Genitourinary: Negative for difficulty urinating.        Medications:    Current Outpatient Medications:     amLODIPine (NORVASC) 5 MG tablet, TAKE 1 TABLET(5 MG) BY MOUTH EVERY DAY, Disp: 90 tablet, Rfl: 2    azelastine (ASTELIN) 137 mcg (0.1 %) nasal spray, 1 spray (137 mcg total) by Nasal route 2 (two) times daily as needed for Rhinitis., Disp: 30 mL, Rfl: 11    EPIPEN 2-CHIDI 0.3 mg/0.3 mL (1:1,000) AtIn, , Disp: , Rfl:     estrogens, conjugated, (PREMARIN) 0.3 MG tablet, Take 1 tablet (0.3 mg total) by mouth once daily., Disp: 30 tablet, Rfl: 11    fexofenadine (ALLEGRA) 180 MG tablet, Take 1 tablet (180 mg total) by mouth once daily., Disp: 90 tablet, Rfl: 3    fluticasone propionate (FLONASE) 50 mcg/actuation nasal spray, SHAKE LIQUID AND USE 2 SPRAYS(100 MCG) IN EACH NOSTRIL EVERY DAY, Disp: 48 g, Rfl: 2    hydroCHLOROthiazide (MICROZIDE) 12.5 mg capsule, Take 1 capsule (12.5 mg total) by mouth once daily., Disp: 90 capsule, Rfl: 1    lifitegrast (XIIDRA) 5 % Dpet, Apply 1 drop to eye every 12 (twelve) hours., Disp: 180 each, Rfl: 4    medroxyPROGESTERone (PROVERA) 10 MG tablet, Take 1 tablet (10 mg total) by mouth once daily. Take one tablet for the first ten days of each month, Disp: 10 tablet, Rfl: 11    montelukast  "(SINGULAIR) 10 mg tablet, Take 1 tablet (10 mg total) by mouth once daily., Disp: 90 tablet, Rfl: 1    omega-3 fatty acids/fish oil (FISH OIL-OMEGA-3 FATTY ACIDS) 300-1,000 mg capsule, Take by mouth once daily., Disp: , Rfl:     indomethacin (INDOCIN) 50 MG capsule, Take 1 capsule (50 mg total) by mouth 3 (three) times daily., Disp: 30 capsule, Rfl: 0     Allergies:  Review of patient's allergies indicates:   Allergen Reactions    Codeine Other (See Comments)     Tremors    Nuts [tree nut] Anaphylaxis    Avocado (laurus persea) Itching, Nausea And Vomiting and Other (See Comments)       Physical Exam      Vital Signs  Temp: 98.3 °F (36.8 °C)  Pulse: 88  Resp: 20  SpO2: 99 %  BP: 106/80  BP Location: Right arm  Pain Score:   6  Height and Weight  Height: 5' 4" (162.6 cm)  Weight: 95.4 kg (210 lb 5.1 oz)  BSA (Calculated - sq m): 2.08 sq meters  BMI (Calculated): 36.1  Weight in (lb) to have BMI = 25: 145.3             Physical Exam  Vitals reviewed.   Constitutional:       General: She is not in acute distress.     Appearance: Normal appearance.   HENT:      Right Ear: External ear normal.      Left Ear: External ear normal.   Eyes:      Conjunctiva/sclera: Conjunctivae normal.   Pulmonary:      Effort: Pulmonary effort is normal.   Musculoskeletal:      Cervical back: Neck supple.      Right lower leg: No edema.      Left lower leg: No edema.      Comments: Left thumb and thenar eminence with warmth and erythema, decreased ROM, marked TTP 1st radio-carpal joint. No snuff box ttp.  2nd finger without ttp or edema - FROM   Skin:     General: Skin is warm and dry.   Neurological:      General: No focal deficit present.      Mental Status: She is alert.   Psychiatric:         Mood and Affect: Mood normal.         Behavior: Behavior normal.          Laboratory:  CBC:  Recent Labs   Lab 12/10/20  0820 04/27/21  1100 02/25/22  0755   WBC 6.26 6.93 5.85   RBC 4.27 4.32 4.43   Hemoglobin 11.3 L 11.8 L 12.2   Hematocrit " 37.4 37.1 38.6   Platelets 421 H 367 381   MCV 88 86 87   MCH 26.5 L 27.3 27.5   MCHC 30.2 L 31.8 L 31.6 L       CMP:  Recent Labs   Lab 12/10/20  0820 04/27/21  1100 02/25/22  0755   Glucose 94   < > 82   Calcium 9.5   < > 9.8   Albumin 3.4 L  --  3.5   Total Protein 8.0  --  7.7   Sodium 137   < > 137   Potassium 4.1   < > 3.9   CO2 28   < > 25   Chloride 101   < > 102   BUN 13   < > 13   Creatinine 0.9   < > 0.8   Alkaline Phosphatase 95  --  96   ALT 26  --  22   AST 21  --  23   Total Bilirubin 0.4  --  0.5    < > = values in this interval not displayed.           URINALYSIS:  Recent Labs   Lab 12/10/20  0958 09/24/21  0858   Color, UA Yellow Yellow   Clarity, UA  --  Cloudy   Specific Gravity, UA 1.020  --    Spec Grav UA  --  1.020   pH, UA 6.0 5   Protein, UA Negative  --    Bacteria Occasional  --    Nitrite, UA Negative neg   Leukocytes, UA Negative  --    Urobilinogen, UA  --  normal        LIPIDS:  Recent Labs   Lab 12/10/20  0820 02/25/22  0755    H 87 H   Cholesterol 219 H 207 H   Triglycerides 91 69   LDL Cholesterol 96.8 106.2   HDL/Cholesterol Ratio 47.5 42.0   Non-HDL Cholesterol 115 120   Total Cholesterol/HDL Ratio 2.1 2.4       TSH:        A1C:  Recent Labs   Lab 12/10/20  0820   Hemoglobin A1C 5.4       Urine Microalbumin/Cr:          Other:   Recent Labs   Lab 12/30/19  1230 12/10/20  0820 02/25/22  0755   Follicle Stimulating Hormone 12.00  --   --    Vit D, 25-Hydroxy  --  >155 H  --    Vitamin B-12  --   --  >2000 H   Ferritin  --   --  66   Iron  --   --  58   Transferrin  --   --  297   TIBC  --   --  440   Saturated Iron  --   --  13 L     Recent Labs   Lab 12/10/20  0820   Hepatitis C Ab Negative       Assessment/Plan     Gisella Bennett is a 51 y.o.female with:    Pain in joint of left hand  -     Uric Acid; Future; Expected date: 03/24/2022  -     CBC Auto Differential; Future; Expected date: 03/24/2022  -     C-reactive protein; Future; Expected date: 03/24/2022  -      X-Ray Hand Complete Left; Future; Expected date: 03/24/2022  Xray to screen for degenerative change, check uric acid and CRP.  Indocin TID 3-5 days with food.    Other orders  -     indomethacin (INDOCIN) 50 MG capsule; Take 1 capsule (50 mg total) by mouth 3 (three) times daily.  Dispense: 30 capsule; Refill: 0         Chronic conditions status updated as per HPI.  Other than changes above, cont current medications and maintain follow up with specialists.  Return to clinic in Follow up in about 1 week (around 3/31/2022), or if symptoms worsen or fail to improve.      Lissy Hightower MD  [facility]

## 2022-03-25 ENCOUNTER — HOSPITAL ENCOUNTER (OUTPATIENT)
Dept: RADIOLOGY | Facility: HOSPITAL | Age: 52
Discharge: HOME | End: 2022-03-25
Attending: FAMILY MEDICINE
Payer: COMMERCIAL

## 2022-03-25 DIAGNOSIS — M25.542 PAIN IN JOINT OF LEFT HAND: ICD-10-CM

## 2022-03-25 PROCEDURE — 73130 XR HAND COMPLETE 3 VIEW LEFT: ICD-10-PCS | Mod: 26,LT,, | Performed by: RADIOLOGY

## 2022-03-25 PROCEDURE — 73130 X-RAY EXAM OF HAND: CPT | Mod: TC,PN,LT

## 2022-03-25 PROCEDURE — 73130 X-RAY EXAM OF HAND: CPT | Mod: 26,LT,, | Performed by: RADIOLOGY

## 2022-03-30 ENCOUNTER — PATIENT MESSAGE (OUTPATIENT)
Dept: PRIMARY CARE CLINIC | Facility: CLINIC | Age: 52
End: 2022-03-30
Payer: COMMERCIAL

## 2022-03-30 NOTE — TELEPHONE ENCOUNTER
Good morning. I would like to know your conclusion upon review of my labs and x-rays about what is going on with my hand. The medicine has reduced swelling and pain considerably. I took it for 5 days. Now my thumb is sore, and discomfort is manageable. Thank you.      Patient is looking for lab and xray results

## 2022-03-30 NOTE — TELEPHONE ENCOUNTER
GREGORIAB-He needs an OV (non urgent). He has persistent poor LV function, but last ECG 1/15/21 showed QRS duration < 120 msec. LMOM for pt to contact the office. Will relay echo message per VEL once we get In contact with pt. Please inform pt that Dr. Hightower is out of office today.  I am covering for her.  I reviewed her Xray of her Left hand which showed DJD (osteoarthritis) most severe at the 1st carpometacarpal joint near the thumb. I'm glad the Indocin has helped.     Dr. LORENZO

## 2022-04-26 DIAGNOSIS — J30.89 PERENNIAL ALLERGIC RHINITIS: ICD-10-CM

## 2022-04-26 RX ORDER — MONTELUKAST SODIUM 10 MG/1
TABLET ORAL
Qty: 90 TABLET | Refills: 3 | Status: SHIPPED | OUTPATIENT
Start: 2022-04-26 | End: 2023-04-06 | Stop reason: SDUPTHER

## 2022-04-27 NOTE — TELEPHONE ENCOUNTER
Refill Authorization Note   Gisella Bennett  is requesting a refill authorization.  Brief Assessment and Rationale for Refill:  Approve     Medication Therapy Plan:       Medication Reconciliation Completed: No   Comments:     No Care Gaps recommended.     Note composed:9:47 PM 04/26/2022

## 2022-06-17 ENCOUNTER — PATIENT MESSAGE (OUTPATIENT)
Dept: PRIMARY CARE CLINIC | Facility: CLINIC | Age: 52
End: 2022-06-17
Payer: COMMERCIAL

## 2022-06-17 ENCOUNTER — TELEPHONE (OUTPATIENT)
Dept: PRIMARY CARE CLINIC | Facility: CLINIC | Age: 52
End: 2022-06-17
Payer: COMMERCIAL

## 2022-06-17 DIAGNOSIS — J20.9 ACUTE BRONCHITIS, UNSPECIFIED ORGANISM: ICD-10-CM

## 2022-06-17 RX ORDER — ALBUTEROL SULFATE 90 UG/1
1-2 POWDER, METERED RESPIRATORY (INHALATION)
Qty: 1 EACH | Refills: 11 | Status: SHIPPED | OUTPATIENT
Start: 2022-06-17 | End: 2022-06-17 | Stop reason: ALTCHOICE

## 2022-06-17 RX ORDER — ALBUTEROL SULFATE 90 UG/1
2 AEROSOL, METERED RESPIRATORY (INHALATION) EVERY 6 HOURS PRN
Qty: 8.5 G | Refills: 11 | Status: SHIPPED | OUTPATIENT
Start: 2022-06-17 | End: 2023-08-23

## 2022-06-17 NOTE — TELEPHONE ENCOUNTER
----- Message from Susan Ellis sent at 6/17/2022  9:30 AM CDT -----  Contact: Pt [phone]  Pt stated that she will try to respond to the portal message but she would like a call back.     Thank you

## 2022-06-17 NOTE — TELEPHONE ENCOUNTER
Spoke with pt via phone call. She advised that when inside she does feel better and she did get some relief from the albuterol inhaler. Pt advised for now do not use the albuterol inhaler again, as it was producing a grainy residue. Pt requesting a new albuterol inhaler be sent to [facility]. Rx transferred from [facility] to [facility].

## 2022-06-17 NOTE — TELEPHONE ENCOUNTER
No new care gaps identified.  [facility] Embedded Care Gaps. Reference number: [id number]. 6/17/2022   8:52:08 AM BRIENT

## 2022-07-06 ENCOUNTER — PATIENT MESSAGE (OUTPATIENT)
Dept: RHEUMATOLOGY | Facility: CLINIC | Age: 52
End: 2022-07-06
Payer: COMMERCIAL

## 2022-07-07 ENCOUNTER — OFFICE VISIT (OUTPATIENT)
Dept: RHEUMATOLOGY | Facility: CLINIC | Age: 52
End: 2022-07-07
Payer: COMMERCIAL

## 2022-07-07 ENCOUNTER — LAB VISIT (OUTPATIENT)
Dept: LAB | Facility: HOSPITAL | Age: 52
End: 2022-07-07
Attending: STUDENT IN AN ORGANIZED HEALTH CARE EDUCATION/TRAINING PROGRAM
Payer: COMMERCIAL

## 2022-07-07 VITALS
HEIGHT: 64 IN | SYSTOLIC BLOOD PRESSURE: 113 MMHG | BODY MASS INDEX: 36.13 KG/M2 | HEART RATE: 91 BPM | DIASTOLIC BLOOD PRESSURE: 75 MMHG | WEIGHT: 211.63 LBS

## 2022-07-07 DIAGNOSIS — M25.50 ARTHRALGIA, UNSPECIFIED JOINT: Primary | ICD-10-CM

## 2022-07-07 DIAGNOSIS — M25.50 ARTHRALGIA, UNSPECIFIED JOINT: ICD-10-CM

## 2022-07-07 DIAGNOSIS — M79.642 LEFT HAND PAIN: ICD-10-CM

## 2022-07-07 LAB
CCP AB SER IA-ACNC: 0.5 U/ML
CRP SERPL-MCNC: 6.7 MG/L (ref 0–8.2)
ERYTHROCYTE [SEDIMENTATION RATE] IN BLOOD BY PHOTOMETRIC METHOD: 40 MM/HR (ref 0–36)
RHEUMATOID FACT SERPL-ACNC: <13 IU/ML (ref 0–15)

## 2022-07-07 PROCEDURE — 86039 ANTINUCLEAR ANTIBODIES (ANA): CPT | Performed by: STUDENT IN AN ORGANIZED HEALTH CARE EDUCATION/TRAINING PROGRAM

## 2022-07-07 PROCEDURE — 86140 C-REACTIVE PROTEIN: CPT | Performed by: STUDENT IN AN ORGANIZED HEALTH CARE EDUCATION/TRAINING PROGRAM

## 2022-07-07 PROCEDURE — 86431 RHEUMATOID FACTOR QUANT: CPT | Performed by: STUDENT IN AN ORGANIZED HEALTH CARE EDUCATION/TRAINING PROGRAM

## 2022-07-07 PROCEDURE — 86038 ANTINUCLEAR ANTIBODIES: CPT | Performed by: STUDENT IN AN ORGANIZED HEALTH CARE EDUCATION/TRAINING PROGRAM

## 2022-07-07 PROCEDURE — 36415 COLL VENOUS BLD VENIPUNCTURE: CPT | Performed by: STUDENT IN AN ORGANIZED HEALTH CARE EDUCATION/TRAINING PROGRAM

## 2022-07-07 PROCEDURE — 85652 RBC SED RATE AUTOMATED: CPT | Performed by: STUDENT IN AN ORGANIZED HEALTH CARE EDUCATION/TRAINING PROGRAM

## 2022-07-07 PROCEDURE — 99204 PR OFFICE/OUTPT VISIT, NEW, LEVL IV, 45-59 MIN: ICD-10-PCS | Mod: S$GLB,,, | Performed by: STUDENT IN AN ORGANIZED HEALTH CARE EDUCATION/TRAINING PROGRAM

## 2022-07-07 PROCEDURE — 86235 NUCLEAR ANTIGEN ANTIBODY: CPT | Mod: 59 | Performed by: STUDENT IN AN ORGANIZED HEALTH CARE EDUCATION/TRAINING PROGRAM

## 2022-07-07 PROCEDURE — 99999 PR PBB SHADOW E&M-EST. PATIENT-LVL IV: ICD-10-PCS | Mod: PBBFAC,,, | Performed by: STUDENT IN AN ORGANIZED HEALTH CARE EDUCATION/TRAINING PROGRAM

## 2022-07-07 PROCEDURE — 99204 OFFICE O/P NEW MOD 45 MIN: CPT | Mod: S$GLB,,, | Performed by: STUDENT IN AN ORGANIZED HEALTH CARE EDUCATION/TRAINING PROGRAM

## 2022-07-07 PROCEDURE — 86200 CCP ANTIBODY: CPT | Performed by: STUDENT IN AN ORGANIZED HEALTH CARE EDUCATION/TRAINING PROGRAM

## 2022-07-07 PROCEDURE — 99999 PR PBB SHADOW E&M-EST. PATIENT-LVL IV: CPT | Mod: PBBFAC,,, | Performed by: STUDENT IN AN ORGANIZED HEALTH CARE EDUCATION/TRAINING PROGRAM

## 2022-07-07 PROCEDURE — 86225 DNA ANTIBODY NATIVE: CPT | Performed by: STUDENT IN AN ORGANIZED HEALTH CARE EDUCATION/TRAINING PROGRAM

## 2022-07-07 RX ORDER — MULTIVITAMIN
1 TABLET ORAL DAILY
COMMUNITY

## 2022-07-07 NOTE — PROGRESS NOTES
Patient seen and examined with fellow.  All elements of history, physical exam and medical decision making independently confirmed by me.  Patient with history of uveitis and positive AMALIA returns now with joint pains in multiple areas and paresthesias of the left hand.  Will evaluate with labs and refer to ortho regarding hand OA.  Trial of capsaicin and if no improvement then Voltaren gel. See note for details.

## 2022-07-07 NOTE — PROGRESS NOTES
Answers for HPI/ROS submitted by the patient on 7/7/2022  fever: No  eye redness: No  mouth sores: No  headaches: No  shortness of breath: No  chest pain: No  trouble swallowing: No  diarrhea: No  constipation: No  unexpected weight change: Yes  genital sore: No  dysuria: No  During the last 3 days, have you had a skin rash?: No  Bruises or bleeds easily: No  cough: No      Rapid3 Question Responses and Scores 7/7/2022   MDHAQ Score 0.4   Psychologic Score 1.1   Pain Score 3.5   When you awakened in the morning OVER THE LAST WEEK, did you feel stiff? Yes   If Yes, please indicate the number of hours until you are as limber as you will be for the day 1   Fatigue Score 0   Global Health Score 3   RAPID3 Score 2.61

## 2022-07-07 NOTE — PROGRESS NOTES
Subjective:       Patient ID: Gisella Bennett is a 51 y.o. female.    Chief Complaint: left hand pain  HPI       Gisella Bennett is a 51 y.o. female with history of uveitis in right eye (+AMALIA, work up unremarkable) and history of lumbar fusion L4-S1 in 2013 presents for evaluation of left hand pain.     She reports left thumb pain since 2021 that first occurred after come she completed a bike ride. Ever since that time, she has had episodes of left thumb pain roughly twice a month.  She describes the pain as burning sensation that is sometimes located over the left thumb, however can radiate to the 2nd finger and sometimes in encompasses the whole back of the left hand.  Pain usually lasts for 2-3 days and resolves by itself.  She was seen by her PCP is in 03/2022 where x-ray was performed and showed severe degenerative arthritis of the left CMC joint.  She has been taking turmeric and glucosamine since that time with mild improvement. Additionally, she reports that she has joint stiffness in her knees and ankles in the AM for greater than 1 hour.  She reports that this started to occur around the same time that she was taking supplements for menopause.  She has had 2 additional episodes uveitis since she was seen in 2018, last episode was in 2019 and was treated with steroid eyedrops with resolution.  She denies joint swelling, alopecia, oral ulcers, Raynaud's, SOB, or chest pain.  No history of miscarriages or blood clots.  No family history of autoimmune disease other than thyroid disease.       Review of Systems   Constitutional: Negative for chills, fatigue and fever.   HENT: Negative for hearing loss, sore throat and trouble swallowing.    Eyes: Negative for visual disturbance.   Respiratory: Negative for cough and shortness of breath.    Cardiovascular: Negative for chest pain.   Gastrointestinal: Negative for abdominal pain, constipation, diarrhea, nausea and vomiting.   Genitourinary: Negative  "for dysuria and frequency.   Musculoskeletal: Negative for arthralgias and myalgias.   Skin: Negative for rash.   Neurological: Negative for dizziness, weakness and headaches.         Objective:   /75   Pulse 91   Ht 5' 4" (1.626 m)   Wt 96 kg (211 lb 10.3 oz)   BMI 36.33 kg/m²      Physical Exam   Constitutional: No distress.   HENT:   Head: Normocephalic and atraumatic.   Eyes: Conjunctivae are normal.   Cardiovascular: Normal rate, regular rhythm and normal heart sounds. Exam reveals no friction rub.   No murmur heard.  Pulmonary/Chest: Effort normal and breath sounds normal. She has no wheezes. She has no rales.   Abdominal: Soft. Bowel sounds are normal. There is no abdominal tenderness. There is no rebound.   Musculoskeletal:      Comments: Pain on palpation of left CMC, no overlying erythema.  Otherwise, no synovitis appreciated in upper or lower extremities.   Neurological: She is alert.   Skin: Skin is warm and dry. She is not diaphoretic.        No data to display     Chart Review:  AMALIA 1:320 homogenous, negative TITA  RF/CCP: negative  ANCA: negative  CRP: 27.4 (3/2022)    Assessment:       1. Arthralgia, unspecified joint    2. Left hand pain        51 y.o. female with history of uveitis in right eye (+AMALIA, work up unremarkable) and history of lumbar fusion L4-S1 in 2013 presents for evaluation of left hand pain.  Reports that she has had monthly episodes of left hand pain since 2021 that have been associated with burning pain the left thumb that occasionally radiates to entire dorsum of the hand.  She also reports a.m. stiffness in her knees and ankles bilaterally for greater than 1 hour, notes have this was during the time she was experiencing menopause.  No weakness or synovitis on exam.  X-ray performed by PCP in March showed severe CMC degenerative arthritis of the left hand.  Labs also at that time showed an elevated CRP.  Prior inflammatory markers from 2018 were within normal limits.  " Given the burning nature her hand differentials would include symptomatic OA of left CMC versus possible entrapment syndrome.  Additionally, will repeat RF and CCP along with inflammatory markers today as she reports stiffness joints, knees and ankles, for greater than 1 hour though no swelling. Recommended that she uses capsacin cream and voltaren gel for symptomatic relief. Will place referral to hand clinic to severe CMC OA of the left thumb.  Plan:       Problem List Items Addressed This Visit    None     Visit Diagnoses     Arthralgia, unspecified joint    -  Primary    Relevant Orders    AMALIA Screen w/Reflex    Rheumatoid Factor    C-Reactive Protein    Sedimentation rate    Cyclic Citrullinated Peptide Antibody, IgG    Left hand pain        Relevant Orders    Ambulatory referral/consult to Hand Surgery        - will get RF, CCP, AMALIA, CRP/ESR  - referral placed a hand clinic for evaluation of possible symptomatic severe left CMC OA  - discussed capsacin cream and voltaren gel for symptomatic relief    Follow-up in 6 months    Patient seen and evaluated with Dr. Rios

## 2022-07-07 NOTE — PATIENT INSTRUCTIONS
Recommend capsaicin cream and Voltaren gel for pain relief before following with the orthopedic hand clinic

## 2022-07-08 LAB
ANA PATTERN 1: NORMAL
ANA SER QL IF: POSITIVE
ANA TITR SER IF: NORMAL {TITER}

## 2022-07-12 ENCOUNTER — OFFICE VISIT (OUTPATIENT)
Dept: PRIMARY CARE CLINIC | Facility: CLINIC | Age: 52
End: 2022-07-12
Payer: COMMERCIAL

## 2022-07-12 VITALS
DIASTOLIC BLOOD PRESSURE: 80 MMHG | OXYGEN SATURATION: 99 % | TEMPERATURE: 98 F | SYSTOLIC BLOOD PRESSURE: 119 MMHG | HEART RATE: 92 BPM | HEIGHT: 64 IN | BODY MASS INDEX: 36.25 KG/M2 | RESPIRATION RATE: 18 BRPM | WEIGHT: 212.31 LBS

## 2022-07-12 DIAGNOSIS — H65.02 NON-RECURRENT ACUTE SEROUS OTITIS MEDIA OF LEFT EAR: Primary | ICD-10-CM

## 2022-07-12 LAB
ANTI SM ANTIBODY: 0.07 RATIO (ref 0–0.99)
ANTI SM/RNP ANTIBODY: 0.09 RATIO (ref 0–0.99)
ANTI-SM INTERPRETATION: NEGATIVE
ANTI-SM/RNP INTERPRETATION: NEGATIVE
ANTI-SSA ANTIBODY: 0.09 RATIO (ref 0–0.99)
ANTI-SSA INTERPRETATION: NEGATIVE
ANTI-SSB ANTIBODY: 0.07 RATIO (ref 0–0.99)
ANTI-SSB INTERPRETATION: NEGATIVE
DSDNA AB SER-ACNC: NORMAL [IU]/ML

## 2022-07-12 PROCEDURE — 99999 PR PBB SHADOW E&M-EST. PATIENT-LVL IV: CPT | Mod: PBBFAC,,, | Performed by: FAMILY MEDICINE

## 2022-07-12 PROCEDURE — 3008F PR BODY MASS INDEX (BMI) DOCUMENTED: ICD-10-PCS | Mod: CPTII,S$GLB,, | Performed by: FAMILY MEDICINE

## 2022-07-12 PROCEDURE — 3008F BODY MASS INDEX DOCD: CPT | Mod: CPTII,S$GLB,, | Performed by: FAMILY MEDICINE

## 2022-07-12 PROCEDURE — 3079F PR MOST RECENT DIASTOLIC BLOOD PRESSURE 80-89 MM HG: ICD-10-PCS | Mod: CPTII,S$GLB,, | Performed by: FAMILY MEDICINE

## 2022-07-12 PROCEDURE — 3074F SYST BP LT 130 MM HG: CPT | Mod: CPTII,S$GLB,, | Performed by: FAMILY MEDICINE

## 2022-07-12 PROCEDURE — 3074F PR MOST RECENT SYSTOLIC BLOOD PRESSURE < 130 MM HG: ICD-10-PCS | Mod: CPTII,S$GLB,, | Performed by: FAMILY MEDICINE

## 2022-07-12 PROCEDURE — 1159F MED LIST DOCD IN RCRD: CPT | Mod: CPTII,S$GLB,, | Performed by: FAMILY MEDICINE

## 2022-07-12 PROCEDURE — 99213 PR OFFICE/OUTPT VISIT, EST, LEVL III, 20-29 MIN: ICD-10-PCS | Mod: S$GLB,,, | Performed by: FAMILY MEDICINE

## 2022-07-12 PROCEDURE — 99213 OFFICE O/P EST LOW 20 MIN: CPT | Mod: S$GLB,,, | Performed by: FAMILY MEDICINE

## 2022-07-12 PROCEDURE — 1159F PR MEDICATION LIST DOCUMENTED IN MEDICAL RECORD: ICD-10-PCS | Mod: CPTII,S$GLB,, | Performed by: FAMILY MEDICINE

## 2022-07-12 PROCEDURE — 1160F RVW MEDS BY RX/DR IN RCRD: CPT | Mod: CPTII,S$GLB,, | Performed by: FAMILY MEDICINE

## 2022-07-12 PROCEDURE — 99999 PR PBB SHADOW E&M-EST. PATIENT-LVL IV: ICD-10-PCS | Mod: PBBFAC,,, | Performed by: FAMILY MEDICINE

## 2022-07-12 PROCEDURE — 1160F PR REVIEW ALL MEDS BY PRESCRIBER/CLIN PHARMACIST DOCUMENTED: ICD-10-PCS | Mod: CPTII,S$GLB,, | Performed by: FAMILY MEDICINE

## 2022-07-12 PROCEDURE — 3079F DIAST BP 80-89 MM HG: CPT | Mod: CPTII,S$GLB,, | Performed by: FAMILY MEDICINE

## 2022-07-12 RX ORDER — IBUPROFEN 600 MG/1
600 TABLET ORAL 3 TIMES DAILY
Qty: 21 TABLET | Refills: 0 | Status: SHIPPED | OUTPATIENT
Start: 2022-07-12 | End: 2022-12-14

## 2022-07-12 NOTE — PROGRESS NOTES
[facility] Note    Chief Complaint      Chief Complaint   Patient presents with    Otalgia       History of Present Illness      Gisella Bennett is a 51 y.o. female who presents today for:    Started as both ears and sore throat and malaise. but now mostly left ear. started 2 days ago.  3 COVID tests neg.   No cough, no drainage, no fever  Astelin and tylenol with minimal relief.    Patient Care Team:  Lissy Hightower MD as PCP - General (Internal Medicine)  Saniya Chao LPN as Care Coordinator (Internal Medicine)     Health Maintenance:  Immunization History   Administered Date(s) Administered    COVID-19, MRNA, LN-S, PF (Pfizer) (Purple Cap) 03/13/2021, 04/03/2021, 11/13/2021    Tdap 12/10/2020    Zoster Recombinant 02/25/2022, 05/05/2022      Health Maintenance   Topic Date Due    Mammogram  12/07/2022    Lipid Panel  02/25/2027    TETANUS VACCINE  12/10/2030    Hepatitis C Screening  Completed        Past Medical History:  Past Medical History:   Diagnosis Date    Acute iritis     Atopic dermatitis     Environmental allergies 3/29/2016    Hypertension     Lower back pain 3/29/2016    Trigeminal neuralgia 4/14/2015    Uveitis of right eye 2003, 2018    Vitamin D deficiency disease 2/16/2015       Past Surgical History:   has a past surgical history that includes Intrauterine device insertion (2013); Spinal fusion (04/2013); Bunionectomy (Left); Laparoscopic cholecystectomy (N/A, 5/13/2021); and Cholecystectomy.    Family History:  family history includes Breast cancer in her maternal aunt; Diabetes in her father; Diabetes Mellitus in her father; Eczema in her brother; Heart disease (age of onset: 56) in her father; Hypertension in her mother; Kidney disease in her father; No Known Problems in her maternal grandfather, maternal grandmother, paternal grandfather, and paternal grandmother; Sarcoidosis (age of onset: 55) in her cousin; Thyroid disease in her mother.      Social History:  Social History     Tobacco Use    Smoking status: Never Smoker    Smokeless tobacco: Never Used    Tobacco comment: [employer]anthropy;     Substance Use Topics    Alcohol use: Yes     Comment: monthly    Drug use: No     Comment: CBD cream       Review of Systems   Constitutional: Negative for fever.   Respiratory: Negative for shortness of breath.    Cardiovascular: Negative for chest pain.   Gastrointestinal: Negative for change in bowel habit and change in bowel habit.   Genitourinary: Negative for difficulty urinating.        Medications:    Current Outpatient Medications:     albuterol (PROVENTIL HFA) 90 mcg/actuation inhaler, Inhale 2 puffs into the lungs every 6 (six) hours as needed for Wheezing. Rescue, Disp: 8.5 g, Rfl: 11    amLODIPine (NORVASC) 5 MG tablet, TAKE 1 TABLET(5 MG) BY MOUTH EVERY DAY, Disp: 90 tablet, Rfl: 2    azelastine (ASTELIN) 137 mcg (0.1 %) nasal spray, 1 spray (137 mcg total) by Nasal route 2 (two) times daily as needed for Rhinitis., Disp: 30 mL, Rfl: 11    EPIPEN 2-CHIDI 0.3 mg/0.3 mL (1:1,000) AtIn, , Disp: , Rfl:     fexofenadine (ALLEGRA) 180 MG tablet, Take 1 tablet (180 mg total) by mouth once daily., Disp: 90 tablet, Rfl: 3    fluticasone propionate (FLONASE) 50 mcg/actuation nasal spray, SHAKE LIQUID AND USE 2 SPRAYS(100 MCG) IN EACH NOSTRIL EVERY DAY, Disp: 48 g, Rfl: 2    hydroCHLOROthiazide (MICROZIDE) 12.5 mg capsule, Take 1 capsule (12.5 mg total) by mouth once daily., Disp: 90 capsule, Rfl: 1    lifitegrast (XIIDRA) 5 % Dpet, Apply 1 drop to eye every 12 (twelve) hours., Disp: 180 each, Rfl: 4    medroxyPROGESTERone (PROVERA) 10 MG tablet, Take 1 tablet (10 mg total) by mouth once daily. Take one tablet for the first ten days of each month, Disp: 10 tablet, Rfl: 11    montelukast (SINGULAIR) 10 mg tablet, TAKE 1 TABLET(10 MG) BY MOUTH EVERY DAY, Disp: 90 tablet, Rfl: 3    multivitamin (THERAGRAN) per tablet, Take 1 tablet by mouth  "once daily., Disp: , Rfl:     omega-3 fatty acids/fish oil (FISH OIL-OMEGA-3 FATTY ACIDS) 300-1,000 mg capsule, Take by mouth once daily., Disp: , Rfl:     estrogens, conjugated, (PREMARIN) 0.3 MG tablet, Take 1 tablet (0.3 mg total) by mouth once daily. (Patient not taking: No sig reported), Disp: 30 tablet, Rfl: 11    ibuprofen (ADVIL,MOTRIN) 600 MG tablet, Take 1 tablet (600 mg total) by mouth 3 (three) times daily., Disp: 21 tablet, Rfl: 0     Allergies:  Review of patient's allergies indicates:   Allergen Reactions    Codeine Other (See Comments)     Tremors    Nuts [tree nut] Anaphylaxis    Avocado (laurus persea) Itching, Nausea And Vomiting and Other (See Comments)       Physical Exam      Vital Signs  Temp: 97.8 °F (36.6 °C)  Pulse: 92  Resp: 18  SpO2: 99 %  BP: 119/80  BP Location: Left arm  Pain Score:   4  Height and Weight  Height: 5' 4" (162.6 cm)  Weight: 96.3 kg (212 lb 4.9 oz)  BSA (Calculated - sq m): 2.09 sq meters  BMI (Calculated): 36.4  Weight in (lb) to have BMI = 25: 145.3             Physical Exam  Vitals reviewed.   Constitutional:       General: She is not in acute distress.     Appearance: Normal appearance.   HENT:      Right Ear: Tympanic membrane, ear canal and external ear normal.      Left Ear: Tympanic membrane, ear canal and external ear normal.      Nose: Nose normal.      Mouth/Throat:      Mouth: Mucous membranes are moist.      Pharynx: Oropharynx is clear. No oropharyngeal exudate or posterior oropharyngeal erythema.   Eyes:      Extraocular Movements: Extraocular movements intact.      Conjunctiva/sclera: Conjunctivae normal.      Pupils: Pupils are equal, round, and reactive to light.   Cardiovascular:      Rate and Rhythm: Normal rate and regular rhythm.      Heart sounds: No murmur heard.    No friction rub. No gallop.   Pulmonary:      Effort: Pulmonary effort is normal.      Breath sounds: No wheezing, rhonchi or rales.   Musculoskeletal:      Cervical back: Neck " supple.      Right lower leg: No edema.      Left lower leg: No edema.   Skin:     General: Skin is warm and dry.   Neurological:      General: No focal deficit present.      Mental Status: She is alert.   Psychiatric:         Mood and Affect: Mood normal.         Behavior: Behavior normal.          Laboratory:  CBC:  Recent Labs   Lab 04/27/21  1100 02/25/22  0755 03/24/22  1439   WBC 6.93 5.85 5.86   RBC 4.32 4.43 4.10   Hemoglobin 11.8 L 12.2 11.8 L   Hematocrit 37.1 38.6 37.1   Platelets 367 381 290   MCV 86 87 91   MCH 27.3 27.5 28.8   MCHC 31.8 L 31.6 L 31.8 L       CMP:  Recent Labs   Lab 12/10/20  0820 04/27/21  1100 02/25/22  0755   Glucose 94   < > 82   Calcium 9.5   < > 9.8   Albumin 3.4 L  --  3.5   Total Protein 8.0  --  7.7   Sodium 137   < > 137   Potassium 4.1   < > 3.9   CO2 28   < > 25   Chloride 101   < > 102   BUN 13   < > 13   Creatinine 0.9   < > 0.8   Alkaline Phosphatase 95  --  96   ALT 26  --  22   AST 21  --  23   Total Bilirubin 0.4  --  0.5    < > = values in this interval not displayed.           URINALYSIS:  Recent Labs   Lab 12/10/20  0958 09/24/21  0858   Color, UA Yellow Yellow   Clarity, UA  --  Cloudy   Specific Gravity, UA 1.020  --    Spec Grav UA  --  1.020   pH, UA 6.0 5   Protein, UA Negative  --    Bacteria Occasional  --    Nitrite, UA Negative neg   Leukocytes, UA Negative  --    Urobilinogen, UA  --  normal        LIPIDS:  Recent Labs   Lab 12/10/20  0820 02/25/22  0755    H 87 H   Cholesterol 219 H 207 H   Triglycerides 91 69   LDL Cholesterol 96.8 106.2   HDL/Cholesterol Ratio 47.5 42.0   Non-HDL Cholesterol 115 120   Total Cholesterol/HDL Ratio 2.1 2.4       TSH:        A1C:  Recent Labs   Lab 12/10/20  0820   Hemoglobin A1C 5.4       Urine Microalbumin/Cr:          Other:   Recent Labs   Lab 12/30/19  1230 12/10/20  0820 02/25/22  0755   Follicle Stimulating Hormone 12.00  --   --    Vit D, 25-Hydroxy  --  >155 H  --    Vitamin B-12  --   --  >2000 H   Ferritin   --   --  66   Iron  --   --  58   Transferrin  --   --  297   TIBC  --   --  440   Saturated Iron  --   --  13 L     Recent Labs   Lab 12/10/20  0820   Hepatitis C Ab Negative       Assessment/Plan     Gisella Bennett is a 51 y.o.female with:    Non-recurrent acute serous otitis media of left ear  COVID neg, ibuprofen for pain. Cont flonase, asteline, singulair.  Declines steroids  Other orders  -     ibuprofen (ADVIL,MOTRIN) 600 MG tablet; Take 1 tablet (600 mg total) by mouth 3 (three) times daily.  Dispense: 21 tablet; Refill: 0         Chronic conditions status updated as per HPI.  Other than changes above, cont current medications and maintain follow up with specialists.  Return to clinic in No follow-ups on file.      Lissy Hightower MD  [facility]

## 2022-07-15 ENCOUNTER — PATIENT MESSAGE (OUTPATIENT)
Dept: PRIMARY CARE CLINIC | Facility: CLINIC | Age: 52
End: 2022-07-15
Payer: COMMERCIAL

## 2022-07-18 ENCOUNTER — OFFICE VISIT (OUTPATIENT)
Dept: ORTHOPEDICS | Facility: CLINIC | Age: 52
End: 2022-07-18
Payer: COMMERCIAL

## 2022-07-18 VITALS — HEIGHT: 64 IN | WEIGHT: 212 LBS | BODY MASS INDEX: 36.19 KG/M2

## 2022-07-18 DIAGNOSIS — M18.12 ARTHRITIS OF CARPOMETACARPAL (CMC) JOINT OF LEFT THUMB: ICD-10-CM

## 2022-07-18 PROCEDURE — 99999 PR PBB SHADOW E&M-EST. PATIENT-LVL III: ICD-10-PCS | Mod: PBBFAC,,, | Performed by: PHYSICIAN ASSISTANT

## 2022-07-18 PROCEDURE — 3008F BODY MASS INDEX DOCD: CPT | Mod: CPTII,S$GLB,, | Performed by: PHYSICIAN ASSISTANT

## 2022-07-18 PROCEDURE — 3008F PR BODY MASS INDEX (BMI) DOCUMENTED: ICD-10-PCS | Mod: CPTII,S$GLB,, | Performed by: PHYSICIAN ASSISTANT

## 2022-07-18 PROCEDURE — 99204 PR OFFICE/OUTPT VISIT, NEW, LEVL IV, 45-59 MIN: ICD-10-PCS | Mod: S$GLB,,, | Performed by: PHYSICIAN ASSISTANT

## 2022-07-18 PROCEDURE — 99204 OFFICE O/P NEW MOD 45 MIN: CPT | Mod: S$GLB,,, | Performed by: PHYSICIAN ASSISTANT

## 2022-07-18 PROCEDURE — 99999 PR PBB SHADOW E&M-EST. PATIENT-LVL III: CPT | Mod: PBBFAC,,, | Performed by: PHYSICIAN ASSISTANT

## 2022-07-18 PROCEDURE — 1159F MED LIST DOCD IN RCRD: CPT | Mod: CPTII,S$GLB,, | Performed by: PHYSICIAN ASSISTANT

## 2022-07-18 PROCEDURE — 1159F PR MEDICATION LIST DOCUMENTED IN MEDICAL RECORD: ICD-10-PCS | Mod: CPTII,S$GLB,, | Performed by: PHYSICIAN ASSISTANT

## 2022-07-18 NOTE — PROGRESS NOTES
Subjective:      Patient ID: Gisella Bennett is a 51 y.o. female.    Chief Complaint: Pain and Swelling of the Left Hand      HPI  Gisella Bennett is a right hand dominant 51 y.o. female presenting today for evaluation of the left hand. She reports symptom onset several months ago. She reports pain at the base of the left thumb. She also describes a prior burning pain over the thumb CMC radiating into the dorsal hand, this burning sensation has since resolved. She does continue to have intermittent pain at the base of the thumb. She reports occasional difficulty with gripping, grasping items. Denies numbness.         Review of patient's allergies indicates:   Allergen Reactions    Codeine Other (See Comments)     Tremors    Nuts [tree nut] Anaphylaxis    Avocado (laurus persea) Itching, Nausea And Vomiting and Other (See Comments)         Current Outpatient Medications   Medication Sig Dispense Refill    albuterol (PROVENTIL HFA) 90 mcg/actuation inhaler Inhale 2 puffs into the lungs every 6 (six) hours as needed for Wheezing. Rescue 8.5 g 11    amLODIPine (NORVASC) 5 MG tablet TAKE 1 TABLET(5 MG) BY MOUTH EVERY DAY 90 tablet 2    azelastine (ASTELIN) 137 mcg (0.1 %) nasal spray 1 spray (137 mcg total) by Nasal route 2 (two) times daily as needed for Rhinitis. 30 mL 11    EPIPEN 2-CHIDI 0.3 mg/0.3 mL (1:1,000) AtIn       fexofenadine (ALLEGRA) 180 MG tablet Take 1 tablet (180 mg total) by mouth once daily. 90 tablet 3    fluticasone propionate (FLONASE) 50 mcg/actuation nasal spray SHAKE LIQUID AND USE 2 SPRAYS(100 MCG) IN EACH NOSTRIL EVERY DAY 48 g 2    hydroCHLOROthiazide (MICROZIDE) 12.5 mg capsule Take 1 capsule (12.5 mg total) by mouth once daily. 90 capsule 1    ibuprofen (ADVIL,MOTRIN) 600 MG tablet Take 1 tablet (600 mg total) by mouth 3 (three) times daily. 21 tablet 0    lifitegrast (XIIDRA) 5 % Dpet Apply 1 drop to eye every 12 (twelve) hours. 180 each 4    medroxyPROGESTERone  "(PROVERA) 10 MG tablet Take 1 tablet (10 mg total) by mouth once daily. Take one tablet for the first ten days of each month 10 tablet 11    montelukast (SINGULAIR) 10 mg tablet TAKE 1 TABLET(10 MG) BY MOUTH EVERY DAY 90 tablet 3    multivitamin (THERAGRAN) per tablet Take 1 tablet by mouth once daily.      omega-3 fatty acids/fish oil (FISH OIL-OMEGA-3 FATTY ACIDS) 300-1,000 mg capsule Take by mouth once daily.      estrogens, conjugated, (PREMARIN) 0.3 MG tablet Take 1 tablet (0.3 mg total) by mouth once daily. (Patient not taking: No sig reported) 30 tablet 11     No current facility-administered medications for this visit.       Past Medical History:   Diagnosis Date    Acute iritis     Atopic dermatitis     Environmental allergies 3/29/2016    Hypertension     Lower back pain 3/29/2016    Trigeminal neuralgia 4/14/2015    Uveitis of right eye 2003, 2018    Vitamin D deficiency disease 2/16/2015       Past Surgical History:   Procedure Laterality Date    BUNIONECTOMY Left     CHOLECYSTECTOMY      INTRAUTERINE DEVICE INSERTION  2013    MIRENA    LAPAROSCOPIC CHOLECYSTECTOMY N/A 5/13/2021    Procedure: CHOLECYSTECTOMY, LAPAROSCOPIC;  Surgeon: Deborah Agosto MD;  [place] [facility] [place] [address] [place] [address];  Service: General;  Laterality: N/A;    SPINAL FUSION  04/2013    Lumbar Spinal fusion, laminectomies       Review of Systems:  Constitutional: Negative for chills and fever.   Respiratory: Negative for cough and shortness of breath.    Gastrointestinal: Negative for nausea and vomiting.   Skin: Negative for rash.   Neurological: Negative for dizziness and headaches.   Psychiatric/Behavioral: Negative for depression.   MSK as in HPI       OBJECTIVE:     PHYSICAL EXAM:  Ht 5' 4" (1.626 m)   Wt 96.2 kg (212 lb)   BMI 36.39 kg/m²     GEN:  NAD, well-developed, well-groomed.  NEURO: Awake, alert, and oriented. Normal attention and concentration.    PSYCH: Normal mood and affect. Behavior is " normal.  HEENT: No cervical lymphadenopathy noted.  CARDIOVASCULAR: Radial pulses 2+ bilaterally. No LE edema noted.  PULMONARY: Breath sounds normal. No respiratory distress.  SKIN: Intact, no rashes.      MSK:   LUE:  Good active ROM of the wrist and fingers. ttp thumb CMC. negative tinels. no first dorsal compartment ttp. AIN/PIN/Radial/Median/Ulnar Nerves assessed in isolation without deficit. Radial & Ulnar arteries palpated 2+. Capillary Refill <3s.      RADIOGRAPHS:  Xray left hand 3/25/2   FINDINGS:  Complete hand three views left: There is DJD most severe at the 1st carpometacarpal joint.  There is good alignment and no complication.  Comments: I have personally reviewed the imaging and I agree with the above radiologist's report.    ASSESSMENT/PLAN:       ICD-10-CM ICD-9-CM   1. Arthritis of carpometacarpal (CMC) joint of left thumb  M18.12 716.94          No orders of the defined types were placed in this encounter.       Plan:   Treatment options discussed. Plan for anti inflammatory diet, paraffin, bracing, continue voltaren. Discussed occupational therapy, injections, surgical options if symptoms persist.        The patient indicates understanding of these issues and agrees to the plan.    Rhonda Messer PA-C  [facility]   [facility]

## 2022-10-19 ENCOUNTER — OFFICE VISIT (OUTPATIENT)
Dept: URGENT CARE | Facility: CLINIC | Age: 52
End: 2022-10-19
Payer: COMMERCIAL

## 2022-10-19 VITALS
DIASTOLIC BLOOD PRESSURE: 85 MMHG | RESPIRATION RATE: 18 BRPM | OXYGEN SATURATION: 99 % | SYSTOLIC BLOOD PRESSURE: 126 MMHG | BODY MASS INDEX: 36.19 KG/M2 | HEART RATE: 83 BPM | HEIGHT: 64 IN | WEIGHT: 212 LBS | TEMPERATURE: 98 F

## 2022-10-19 DIAGNOSIS — H65.05 RECURRENT ACUTE SEROUS OTITIS MEDIA OF LEFT EAR: Primary | ICD-10-CM

## 2022-10-19 DIAGNOSIS — J30.89 PERENNIAL ALLERGIC RHINITIS: ICD-10-CM

## 2022-10-19 PROCEDURE — 3079F PR MOST RECENT DIASTOLIC BLOOD PRESSURE 80-89 MM HG: ICD-10-PCS | Mod: CPTII,S$GLB,, | Performed by: EMERGENCY MEDICINE

## 2022-10-19 PROCEDURE — 3074F PR MOST RECENT SYSTOLIC BLOOD PRESSURE < 130 MM HG: ICD-10-PCS | Mod: CPTII,S$GLB,, | Performed by: EMERGENCY MEDICINE

## 2022-10-19 PROCEDURE — 1159F PR MEDICATION LIST DOCUMENTED IN MEDICAL RECORD: ICD-10-PCS | Mod: CPTII,S$GLB,, | Performed by: EMERGENCY MEDICINE

## 2022-10-19 PROCEDURE — 1160F PR REVIEW ALL MEDS BY PRESCRIBER/CLIN PHARMACIST DOCUMENTED: ICD-10-PCS | Mod: CPTII,S$GLB,, | Performed by: EMERGENCY MEDICINE

## 2022-10-19 PROCEDURE — 1159F MED LIST DOCD IN RCRD: CPT | Mod: CPTII,S$GLB,, | Performed by: EMERGENCY MEDICINE

## 2022-10-19 PROCEDURE — 1160F RVW MEDS BY RX/DR IN RCRD: CPT | Mod: CPTII,S$GLB,, | Performed by: EMERGENCY MEDICINE

## 2022-10-19 PROCEDURE — 99214 OFFICE O/P EST MOD 30 MIN: CPT | Mod: S$GLB,,, | Performed by: EMERGENCY MEDICINE

## 2022-10-19 PROCEDURE — 3074F SYST BP LT 130 MM HG: CPT | Mod: CPTII,S$GLB,, | Performed by: EMERGENCY MEDICINE

## 2022-10-19 PROCEDURE — 99214 PR OFFICE/OUTPT VISIT, EST, LEVL IV, 30-39 MIN: ICD-10-PCS | Mod: S$GLB,,, | Performed by: EMERGENCY MEDICINE

## 2022-10-19 PROCEDURE — 3079F DIAST BP 80-89 MM HG: CPT | Mod: CPTII,S$GLB,, | Performed by: EMERGENCY MEDICINE

## 2022-10-19 RX ORDER — AZELASTINE 1 MG/ML
1 SPRAY, METERED NASAL 2 TIMES DAILY PRN
Qty: 30 ML | Refills: 0 | Status: SHIPPED | OUTPATIENT
Start: 2022-10-19 | End: 2023-01-04 | Stop reason: DRUGHIGH

## 2022-10-20 NOTE — PROGRESS NOTES
"Subjective:       Patient ID: Gisella Bennett is a 52 y.o. female.    Vitals:  height is 5' 4" (1.626 m) and weight is 96.2 kg (212 lb). Her temperature is 97.7 °F (36.5 °C). Her blood pressure is 126/85 and her pulse is 83. Her respiration is 18 and oxygen saturation is 99%.     Chief Complaint: Otalgia    Pt presents with left otalgia x 1 day. She is using acetaminophen to alleviate symptoms without relief. No other symptoms.     Otalgia   There is pain in the left ear. This is a new problem. The current episode started yesterday. The problem occurs constantly. The problem has been unchanged. There has been no fever. The pain is at a severity of 5/10. The pain is moderate. Pertinent negatives include no abdominal pain, coughing, diarrhea, rash or vomiting. She has tried acetaminophen for the symptoms. The treatment provided no relief.     Constitution: Negative for chills and fever.   HENT:  Positive for ear pain and congestion.    Respiratory:  Negative for cough and sputum production.    Gastrointestinal:  Negative for abdominal pain, nausea, vomiting, constipation and diarrhea.   Genitourinary:  Negative for dysuria, frequency and urgency.   Musculoskeletal:  Negative for muscle cramps and muscle ache.   Skin:  Negative for rash and erythema.     Objective:      Physical Exam   Constitutional: She is oriented to person, place, and time. She appears well-developed.   HENT:   Head: Normocephalic and atraumatic. Head is without abrasion, without contusion and without laceration.   Ears:   Right Ear: Hearing, tympanic membrane, external ear and ear canal normal.   Left Ear: External ear normal. A middle ear effusion is present.   Nose: Rhinorrhea present.   Oropharyngeal exam not performed due to risk of viral transmission during global pandemic-- risks outweigh benefits of exam          Comments: Oropharyngeal exam not performed due to risk of viral transmission during global pandemic-- risks outweigh " benefits of exam      Eyes: Conjunctivae, EOM and lids are normal. Pupils are equal, round, and reactive to light.   Neck: Trachea normal and phonation normal. Neck supple.   Cardiovascular: Normal rate, regular rhythm and normal heart sounds.   Pulmonary/Chest: Effort normal and breath sounds normal. No stridor. No respiratory distress.   Musculoskeletal: Normal range of motion.         General: Normal range of motion.   Neurological: She is alert and oriented to person, place, and time.   Skin: Skin is warm, dry, intact and no rash. Capillary refill takes less than 2 seconds. No abrasion, No burn, No bruising, No erythema and No ecchymosis   Psychiatric: Her speech is normal and behavior is normal. Judgment and thought content normal.   Nursing note and vitals reviewed.      Assessment:       1. Recurrent acute serous otitis media of left ear    2. Perennial allergic rhinitis          Plan:         Recurrent acute serous otitis media of left ear    Perennial allergic rhinitis  -     azelastine (ASTELIN) 137 mcg (0.1 %) nasal spray; 1 spray (137 mcg total) by Nasal route 2 (two) times daily as needed for Rhinitis.  Dispense: 30 mL; Refill: 0  -     Ambulatory referral/consult to Allergy            Patient Instructions   Patient Education     Continue current allergy medications    Afrin nasal spray as directed for the next 2 days    Tylenol 500mg 2 tabs by mouth every 8 hours  Motrin 200mg 2 tabs by mouth every 8 hours  Alternate Tylenol and Motrin every 4hours      If symptoms do not improve in 2 days or if you develop fever begin taking antibiotics         Serous Otitis Media   About this topic   The Eustachian tube allows fluid to drain from the middle ear. Sometimes, fluid cannot drain from the tube. This may cause an acute or chronic problem known as serous otitis media. An acute problem is one that does not last for a long time. Acute serous media is often caused by an infection or allergy. A chronic problem  is one that lasts for a long time. Chronic serous otitis media may happen when the tube stays blocked because the fluid is too thick to drain from the tube.  This problem may go away on its own without treatment. If the fluid becomes infected, you may have ear infections more often. Your ears may feel like they are full and you may not be able to hear as well.     What are the causes?   Serous otitis media happens when something blocks the Eustachian tube. Sometimes, you are born with this problem. Other causes may include:  Infection  Allergy  Irritants like cigarette smoke  Drinking when lying down  Increase in air pressure like when flying  Enlarged adenoids  Cleft palate  What can make this more likely to happen?   Young children are more likely to have this problem. Kids get more colds. They have Eustachian tubes that are not as developed as those of an adult. Having many colds or allergies may make you more at risk. Having a problem you were born with may cause a block.  What are the main signs?   Trouble hearing  Ear fullness  Pain or pulling on the ear  Problems with balance  How does the doctor diagnose this health problem?   Your doctor will take your history. Your doctor will do an exam and check your ears with a special tool. The doctor will look for changes to the eardrum. The doctor may order:  Lab tests  Hearing tests  How does the doctor treat this health problem?   Your doctor will treat the problem based on what the cause is. Often, it is an acute problem that the doctor will monitor over time. Drugs often do not help. Surgery may be needed to remove chronic fluid. Ear tubes may be put in to open the Eustachian tube.  Are there other health problems to treat?   If enlarged adenoids are the problem, you may need surgery to remove them.  What drugs may be needed?   Your doctor may order drugs based on what problems you are having. The doctor may order drugs to:  Help with pain and swelling  Fight an  infection   Treat an allergy  What problems could happen?   Infection could come back  Loss of hearing  Problems with speech  Scarring on the eardrum  What can be done to prevent this health problem?   If you smoke, quit. Do not go near people who smoke.  Stay away from people who have colds.  If you have allergies, treat them, and try to avoid your triggers.  Wash your hands often.  If over 12 months of age, stop daytime use of a pacifier.  Where can I learn more?   American [employer]  https://familydoctor.org/condition/ear-infection/   [employer]  https://familydoctor.org/condition/eustachian-tube-dysfunction/   [employer]  https://familydoctor.org/condition/otitis-media-with-effusion/   Last Reviewed Date   2020-08-05  Consumer Information Use and Disclaimer   This information is not specific medical advice and does not replace information you receive from your health care provider. This is only a brief summary of general information. It does NOT include all information about conditions, illnesses, injuries, tests, procedures, treatments, therapies, discharge instructions or life-style choices that may apply to you. You must talk with your health care provider for complete information about your health and treatment options. This information should not be used to decide whether or not to accept your health care providers advice, instructions or recommendations. Only your health care provider has the knowledge and training to provide advice that is right for you.  Copyright   Copyright © 2021 [employer] and its affiliates and/or licensors. All rights reserved.

## 2022-10-20 NOTE — PATIENT INSTRUCTIONS
Patient Education     Continue current allergy medications    Afrin nasal spray as directed for the next 2 days    Tylenol 500mg 2 tabs by mouth every 8 hours  Motrin 200mg 2 tabs by mouth every 8 hours  Alternate Tylenol and Motrin every 4hours      If symptoms do not improve in 2 days or if you develop fever begin taking antibiotics         Serous Otitis Media   About this topic   The Eustachian tube allows fluid to drain from the middle ear. Sometimes, fluid cannot drain from the tube. This may cause an acute or chronic problem known as serous otitis media. An acute problem is one that does not last for a long time. Acute serous media is often caused by an infection or allergy. A chronic problem is one that lasts for a long time. Chronic serous otitis media may happen when the tube stays blocked because the fluid is too thick to drain from the tube.  This problem may go away on its own without treatment. If the fluid becomes infected, you may have ear infections more often. Your ears may feel like they are full and you may not be able to hear as well.     What are the causes?   Serous otitis media happens when something blocks the Eustachian tube. Sometimes, you are born with this problem. Other causes may include:  Infection  Allergy  Irritants like cigarette smoke  Drinking when lying down  Increase in air pressure like when flying  Enlarged adenoids  Cleft palate  What can make this more likely to happen?   Young children are more likely to have this problem. Kids get more colds. They have Eustachian tubes that are not as developed as those of an adult. Having many colds or allergies may make you more at risk. Having a problem you were born with may cause a block.  What are the main signs?   Trouble hearing  Ear fullness  Pain or pulling on the ear  Problems with balance  How does the doctor diagnose this health problem?   Your doctor will take your history. Your doctor will do an exam and check your ears with  a special tool. The doctor will look for changes to the eardrum. The doctor may order:  Lab tests  Hearing tests  How does the doctor treat this health problem?   Your doctor will treat the problem based on what the cause is. Often, it is an acute problem that the doctor will monitor over time. Drugs often do not help. Surgery may be needed to remove chronic fluid. Ear tubes may be put in to open the Eustachian tube.  Are there other health problems to treat?   If enlarged adenoids are the problem, you may need surgery to remove them.  What drugs may be needed?   Your doctor may order drugs based on what problems you are having. The doctor may order drugs to:  Help with pain and swelling  Fight an infection   Treat an allergy  What problems could happen?   Infection could come back  Loss of hearing  Problems with speech  Scarring on the eardrum  What can be done to prevent this health problem?   If you smoke, quit. Do not go near people who smoke.  Stay away from people who have colds.  If you have allergies, treat them, and try to avoid your triggers.  Wash your hands often.  If over 12 months of age, stop daytime use of a pacifier.  Where can I learn more?   American Academy of [employer] Physicians  https://[email or web address]/condition/ear-infection/   American Academy of [employer]icians  https://familydoctor.org/condition/eustachian-tube-dysfunction/   American Academy of [employer]icians  https://familydoctor.org/condition/otitis-media-with-effusion/   Last Reviewed Date   2020-08-05  Consumer Information Use and Disclaimer   This information is not specific medical advice and does not replace information you receive from your health care provider. This is only a brief summary of general information. It does NOT include all information about conditions, illnesses, injuries, tests, procedures, treatments, therapies, discharge instructions or life-style choices that may apply to you. You must talk with your health  care provider for complete information about your health and treatment options. This information should not be used to decide whether or not to accept your health care providers advice, instructions or recommendations. Only your health care provider has the knowledge and training to provide advice that is right for you.  Copyright   Copyright © 2021 [employer] and its affiliates and/or licensors. All rights reserved.

## 2022-11-09 ENCOUNTER — PATIENT MESSAGE (OUTPATIENT)
Dept: OPTOMETRY | Facility: CLINIC | Age: 52
End: 2022-11-09
Payer: COMMERCIAL

## 2022-11-28 ENCOUNTER — OFFICE VISIT (OUTPATIENT)
Dept: OPTOMETRY | Facility: CLINIC | Age: 52
End: 2022-11-28
Payer: COMMERCIAL

## 2022-11-28 DIAGNOSIS — R76.8 ANA POSITIVE: ICD-10-CM

## 2022-11-28 DIAGNOSIS — H16.101 SUPERFICIAL KERATITIS OF RIGHT EYE: ICD-10-CM

## 2022-11-28 DIAGNOSIS — H21.561 PUPIL IRREGULAR OF RIGHT EYE: ICD-10-CM

## 2022-11-28 DIAGNOSIS — I10 ESSENTIAL HYPERTENSION: ICD-10-CM

## 2022-11-28 DIAGNOSIS — H52.4 PRESBYOPIA: ICD-10-CM

## 2022-11-28 DIAGNOSIS — H20.9 UVEITIS: ICD-10-CM

## 2022-11-28 DIAGNOSIS — H04.123 DRY EYE SYNDROME, BILATERAL: Primary | ICD-10-CM

## 2022-11-28 PROCEDURE — 92014 COMPRE OPH EXAM EST PT 1/>: CPT | Mod: S$GLB,,, | Performed by: OPTOMETRIST

## 2022-11-28 PROCEDURE — 1159F PR MEDICATION LIST DOCUMENTED IN MEDICAL RECORD: ICD-10-PCS | Mod: CPTII,S$GLB,, | Performed by: OPTOMETRIST

## 2022-11-28 PROCEDURE — 99999 PR PBB SHADOW E&M-EST. PATIENT-LVL III: CPT | Mod: PBBFAC,,, | Performed by: OPTOMETRIST

## 2022-11-28 PROCEDURE — 99999 PR PBB SHADOW E&M-EST. PATIENT-LVL III: ICD-10-PCS | Mod: PBBFAC,,, | Performed by: OPTOMETRIST

## 2022-11-28 PROCEDURE — 92014 PR EYE EXAM, EST PATIENT,COMPREHESV: ICD-10-PCS | Mod: S$GLB,,, | Performed by: OPTOMETRIST

## 2022-11-28 PROCEDURE — 1159F MED LIST DOCD IN RCRD: CPT | Mod: CPTII,S$GLB,, | Performed by: OPTOMETRIST

## 2022-11-28 PROCEDURE — 92015 PR REFRACTION: ICD-10-PCS | Mod: S$GLB,,, | Performed by: OPTOMETRIST

## 2022-11-28 PROCEDURE — 92015 DETERMINE REFRACTIVE STATE: CPT | Mod: S$GLB,,, | Performed by: OPTOMETRIST

## 2022-11-28 RX ORDER — DIFLUPREDNATE OPHTHALMIC 0.5 MG/ML
1 EMULSION OPHTHALMIC 4 TIMES DAILY
Qty: 3 ML | Refills: 0 | Status: SHIPPED | OUTPATIENT
Start: 2022-11-28 | End: 2022-12-08

## 2022-11-28 NOTE — PROGRESS NOTES
HPI    Annual  mrx / dfe  ou    CC: pt had a flare up of Uveitis x3wks ago. Va blurry OD from Uveitis.   Pain scale =6 x3 wks ago - pain today =2. Pt was on vacation when Uveitis   flared up and appt was roc.'d for today.    Gtts: PF 1% BID OD  Last edited by Tasha Perez on 11/28/2022  9:57 AM.            Assessment /Plan     For exam results, see Encounter Report.    Dry eye syndrome, bilateral  Superficial keratitis of right eye  -     lifitegrast (XIIDRA) 5 % Dpet; Apply 1 drop to eye every 12 (twelve) hours.  Dispense: 180 each; Refill: 4    Uveitis  Pupil irregular of right eye  AMALIA positive      Continue 4/3/2/1/ taper difluprednate (DUREZOL) 0.05 % Drop ophthalmic solution; Place 1 drop into the right eye 4 (four) times daily. for 10 days  Dispense: 3 mL; Refill: 0    Essential hypertension   No retinopathy, monitor yearly    Presbyopia   `Update spec Rx      RTC 1 year

## 2022-12-13 ENCOUNTER — TELEPHONE (OUTPATIENT)
Dept: PRIMARY CARE CLINIC | Facility: CLINIC | Age: 52
End: 2022-12-13
Payer: COMMERCIAL

## 2022-12-13 NOTE — TELEPHONE ENCOUNTER
"Spoke with the pt, she reports Cough, chest tightness when cough, and "feel like the air is heavy when breathing". She has been experiencing symptoms for several day. Denies chest pain, left sided pain, vision changes, SOB, dizziness, or other symptoms. Pt advised that symptoms are relieved with use of her albuterol inhaler.     Appt scheduled for tomorrow. Pt agreed to go to UC or ER if she develops any of the above symptoms or if she stops getting relief from the albuterol inhaler. Pt VU and agreed to plan.   "

## 2022-12-14 ENCOUNTER — OFFICE VISIT (OUTPATIENT)
Dept: ALLERGY | Facility: CLINIC | Age: 52
End: 2022-12-14
Payer: COMMERCIAL

## 2022-12-14 ENCOUNTER — OFFICE VISIT (OUTPATIENT)
Dept: PRIMARY CARE CLINIC | Facility: CLINIC | Age: 52
End: 2022-12-14
Payer: COMMERCIAL

## 2022-12-14 VITALS
WEIGHT: 225.5 LBS | HEIGHT: 64 IN | DIASTOLIC BLOOD PRESSURE: 88 MMHG | HEART RATE: 90 BPM | TEMPERATURE: 98 F | BODY MASS INDEX: 38.5 KG/M2 | OXYGEN SATURATION: 99 % | SYSTOLIC BLOOD PRESSURE: 120 MMHG

## 2022-12-14 VITALS — WEIGHT: 223.13 LBS | BODY MASS INDEX: 38.09 KG/M2 | HEIGHT: 64 IN

## 2022-12-14 DIAGNOSIS — R07.89 CHEST PRESSURE: Primary | ICD-10-CM

## 2022-12-14 DIAGNOSIS — J31.0 CHRONIC RHINITIS: ICD-10-CM

## 2022-12-14 DIAGNOSIS — Z91.018 FOOD ALLERGY: ICD-10-CM

## 2022-12-14 DIAGNOSIS — H69.90 DYSFUNCTION OF EUSTACHIAN TUBE, UNSPECIFIED LATERALITY: Primary | ICD-10-CM

## 2022-12-14 PROCEDURE — 3079F PR MOST RECENT DIASTOLIC BLOOD PRESSURE 80-89 MM HG: ICD-10-PCS | Mod: CPTII,S$GLB,, | Performed by: STUDENT IN AN ORGANIZED HEALTH CARE EDUCATION/TRAINING PROGRAM

## 2022-12-14 PROCEDURE — 3074F PR MOST RECENT SYSTOLIC BLOOD PRESSURE < 130 MM HG: ICD-10-PCS | Mod: CPTII,S$GLB,, | Performed by: STUDENT IN AN ORGANIZED HEALTH CARE EDUCATION/TRAINING PROGRAM

## 2022-12-14 PROCEDURE — 99205 OFFICE O/P NEW HI 60 MIN: CPT | Mod: S$GLB,,, | Performed by: STUDENT IN AN ORGANIZED HEALTH CARE EDUCATION/TRAINING PROGRAM

## 2022-12-14 PROCEDURE — 93010 EKG 12-LEAD: ICD-10-PCS | Mod: S$GLB,,, | Performed by: INTERNAL MEDICINE

## 2022-12-14 PROCEDURE — 3079F DIAST BP 80-89 MM HG: CPT | Mod: CPTII,S$GLB,, | Performed by: STUDENT IN AN ORGANIZED HEALTH CARE EDUCATION/TRAINING PROGRAM

## 2022-12-14 PROCEDURE — 99999 PR PBB SHADOW E&M-EST. PATIENT-LVL V: ICD-10-PCS | Mod: PBBFAC,,, | Performed by: STUDENT IN AN ORGANIZED HEALTH CARE EDUCATION/TRAINING PROGRAM

## 2022-12-14 PROCEDURE — 3008F BODY MASS INDEX DOCD: CPT | Mod: CPTII,S$GLB,, | Performed by: STUDENT IN AN ORGANIZED HEALTH CARE EDUCATION/TRAINING PROGRAM

## 2022-12-14 PROCEDURE — 3008F PR BODY MASS INDEX (BMI) DOCUMENTED: ICD-10-PCS | Mod: CPTII,S$GLB,, | Performed by: STUDENT IN AN ORGANIZED HEALTH CARE EDUCATION/TRAINING PROGRAM

## 2022-12-14 PROCEDURE — 99213 PR OFFICE/OUTPT VISIT, EST, LEVL III, 20-29 MIN: ICD-10-PCS | Mod: S$GLB,,, | Performed by: STUDENT IN AN ORGANIZED HEALTH CARE EDUCATION/TRAINING PROGRAM

## 2022-12-14 PROCEDURE — 1159F MED LIST DOCD IN RCRD: CPT | Mod: CPTII,S$GLB,, | Performed by: STUDENT IN AN ORGANIZED HEALTH CARE EDUCATION/TRAINING PROGRAM

## 2022-12-14 PROCEDURE — 1160F RVW MEDS BY RX/DR IN RCRD: CPT | Mod: CPTII,S$GLB,, | Performed by: STUDENT IN AN ORGANIZED HEALTH CARE EDUCATION/TRAINING PROGRAM

## 2022-12-14 PROCEDURE — 99999 PR PBB SHADOW E&M-EST. PATIENT-LVL IV: CPT | Mod: PBBFAC,,, | Performed by: STUDENT IN AN ORGANIZED HEALTH CARE EDUCATION/TRAINING PROGRAM

## 2022-12-14 PROCEDURE — 99205 PR OFFICE/OUTPT VISIT, NEW, LEVL V, 60-74 MIN: ICD-10-PCS | Mod: S$GLB,,, | Performed by: STUDENT IN AN ORGANIZED HEALTH CARE EDUCATION/TRAINING PROGRAM

## 2022-12-14 PROCEDURE — 93010 ELECTROCARDIOGRAM REPORT: CPT | Mod: S$GLB,,, | Performed by: INTERNAL MEDICINE

## 2022-12-14 PROCEDURE — 99999 PR PBB SHADOW E&M-EST. PATIENT-LVL IV: ICD-10-PCS | Mod: PBBFAC,,, | Performed by: STUDENT IN AN ORGANIZED HEALTH CARE EDUCATION/TRAINING PROGRAM

## 2022-12-14 PROCEDURE — 99999 PR PBB SHADOW E&M-EST. PATIENT-LVL V: CPT | Mod: PBBFAC,,, | Performed by: STUDENT IN AN ORGANIZED HEALTH CARE EDUCATION/TRAINING PROGRAM

## 2022-12-14 PROCEDURE — 1159F PR MEDICATION LIST DOCUMENTED IN MEDICAL RECORD: ICD-10-PCS | Mod: CPTII,S$GLB,, | Performed by: STUDENT IN AN ORGANIZED HEALTH CARE EDUCATION/TRAINING PROGRAM

## 2022-12-14 PROCEDURE — 3074F SYST BP LT 130 MM HG: CPT | Mod: CPTII,S$GLB,, | Performed by: STUDENT IN AN ORGANIZED HEALTH CARE EDUCATION/TRAINING PROGRAM

## 2022-12-14 PROCEDURE — 93005 EKG 12-LEAD: ICD-10-PCS | Mod: S$GLB,,, | Performed by: STUDENT IN AN ORGANIZED HEALTH CARE EDUCATION/TRAINING PROGRAM

## 2022-12-14 PROCEDURE — 99213 OFFICE O/P EST LOW 20 MIN: CPT | Mod: S$GLB,,, | Performed by: STUDENT IN AN ORGANIZED HEALTH CARE EDUCATION/TRAINING PROGRAM

## 2022-12-14 PROCEDURE — 1160F PR REVIEW ALL MEDS BY PRESCRIBER/CLIN PHARMACIST DOCUMENTED: ICD-10-PCS | Mod: CPTII,S$GLB,, | Performed by: STUDENT IN AN ORGANIZED HEALTH CARE EDUCATION/TRAINING PROGRAM

## 2022-12-14 PROCEDURE — 93005 ELECTROCARDIOGRAM TRACING: CPT | Mod: S$GLB,,, | Performed by: STUDENT IN AN ORGANIZED HEALTH CARE EDUCATION/TRAINING PROGRAM

## 2022-12-14 RX ORDER — FLUTICASONE PROPIONATE 50 MCG
2 SPRAY, SUSPENSION (ML) NASAL 2 TIMES DAILY
Qty: 31.6 ML | Refills: 5 | Status: SHIPPED | OUTPATIENT
Start: 2022-12-14

## 2022-12-14 RX ORDER — BNT162B2 0.23 MG/2.25ML
INJECTION, SUSPENSION INTRAMUSCULAR
COMMUNITY
Start: 2022-08-04

## 2022-12-14 RX ORDER — MELOXICAM 15 MG/1
15 TABLET ORAL
COMMUNITY
Start: 2022-12-07 | End: 2024-02-09

## 2022-12-14 NOTE — PROGRESS NOTES
Allergy Clinic Note  [facility]    This note was created by combination of typed  and M-Modal dictation. Transcription errors may be present.  If there are any questions, please contact me.    Subjective:      Patient ID: Gisella Bennett is a 52 y.o. female.    Chief Complaint: Allergic Rhinitis       Referring Provider: Jose Francisco Ibarra III    History of Present Illness: Gisella Bennett is a 52 y.o. female referred for intermittent ear pain is here c/o chest for 4 days.  She is here alone, and she is a detailed historian.    Related medications and other interventions  EpiPen  Flonase 2 squirts each nostril daily   Astelin  Elinor Edmond as needed    12/14/2022:  At initial visit, client reported that upon return from recent air travel she felt short of breath like the air felt thick or heavy.   She reports chest tightness in her central chest and cough with inhalation.  She is not sure about wheezing.  She denied fever other constitutional symptoms.  She took albuterol which had been prescribed for her previously with benefit.  Overall these symptoms are improving.    Patient's main complaint and reason for referral is intermittent severe ear pain for 2 years.  It occurs more often on the left than the right.  It is intense pressure radiating down the lateral side of her neck.  She admits to sometimes feeling off balance during episodes.  On 1 occasion she reported aches and chills.  She denies vomiting, hearing loss, or tinnitus.  Symptoms have been unchanged on Flonase 2 squirts daily and azelastine.  This is superimposed on chronic rhinitis as below.    Patient's main rhinitic symptom is nasal congestion.  Additional symptoms include runny nose, postnasal drip, sneezing, itching cough at the level of her throat, and throat clearing.  Symptoms are severe and prevent her from taking walks outside from February until October.  Symptoms are perennial with worsening in the  "spring ([place] through summer) and fall.  She is concerned about pollen as a trigger.  She add allergy testing in the past and recalls being positive for oak and sycamore trees.    Other allergic syndromes  1. Given albuterol twice in the past, for bronchitis and also for rhinitis with cough.    2. Tree nut allergy:  Patient states eating pecans causes hand and finger swelling.  She says she is also had gums swelling and tongue swelling.  She avoids all tree nuts  3. Peanut allergy.  Patient states history of peanut anaphylaxis.    4. Avocado allergy:  Patient's say is eating and avocado causes immediate vomiting.  Patient states that contact causes burning.  On 1 occasion and avocado shampoo causes scalp welts.    5. Adalid syndrome           MEDICAL HISTORY      Significant past medical history: dry eye syndrome, HTN  Active Problem List reviewed  ENT surgery:  no    Significant family history:  Exposures: dog  Smoking Hx:  Client  reports that she has never smoked. She has never used smokeless tobacco.    Meds:  MAR reviewed    Asthma:  Eczema: yes  Rhinitis:  Drug allergy/intolerance:   Venom allergy: no  Latex allergy:  no        REVIEW OF SYSTEMS      CONST: no F/C/NS, no unintentional weight changes  NEURO:  no tremor, no weakness  EYES: no discharge, no pruritus, no erythema  EARS: no hearing loss, no sensation of fullness  NOSE: no congestion, no rhinorrhea, no sneezing  PULM:  no SOB, no wheezing, no cough  CV: no CP, no palpitations, no leg swelling  GI:  no abdominal pain, no blood in stool  :  no dysuria, no hematuria  DERM: no rashes, no skin breaks    Objective:     Physical Exam:     Ht 5' 4" (1.626 m)   Wt 101.2 kg (223 lb 1.7 oz)   LMP 04/26/2022   BMI 38.30 kg/m²   GEN: Awake and alert, no distress  DERM: No rashes or flushing  EYE:  No occular discharge  HEENT: No nasal discharge, no hoarseness, TMs are clear bilaterally.  Nares are pink with severe turbinate swelling.  Oropharynx is " benign without exudate.  Tongue is not coated.  NECK: FROM, no LAD  PULM: Normal work of breathing, no cough, CTA  COR:  RRR, normal pulses  NEURO:  No focal deficit, speech fluent and logical  PSYCH: appropriate affect, normal behavior        Allergy Testing            Lab results            Imaging and other diagnostics            Medical records review          ASSESSMENT & PLAN       Diagnoses:     Gisella Bennett is a 52 y.o. female. with  1. Dysfunction of Eustachian tube, unspecified laterality    2. Chronic rhinitis    3. Food allergy peanut, tree nut and avocado by Hx)          Medical Decision making:   Client's ear pain is consistent with eustachian tube dysfunction superimposed on chronic rhinitis. Discussed that her severe nasal turbinate swelling causes mucus back up into her ear tubes.  Described the best treatment is opening her nose.  Will start with high-dose nasal steroids.  May consider budesonide if not adequately controlled.  Diagnostically, I recommend screening for airborne allergens by the Immunocap method.    Client's chest sx are consistent with mild bronchospasm and are resolving on BRIANNA alone.        Dysfunction of Eustachian tube, unspecified laterality  -     fluticasone propionate (FLONASE) 50 mcg/actuation nasal spray; 2 sprays (100 mcg total) by Each Nostril route 2 (two) times daily.  Dispense: 31.6 mL; Refill: 5    Chronic rhinitis  -     IgE; Future; Expected date: 12/14/2022  -     Dermatophagoides [place]; Future; Expected date: 12/14/2022  -     Dermatophagoides Pteronyssinus; Future; Expected date: 12/14/2022  -     [place]; Future; Expected date: 12/14/2022  -     Ele[place]io; Future; Expected date: 12/14/2022  -     [place]; Future; Expected date: 12/14/2022  -     English Plantain; Future; Expected date: 12/14/2022  -     [place] Pecan; Future; Expected date: 12/14/2022  -     Pecan; Future; Expected date: 12/14/2022  -     Ragweed; Future; Expected date: 12/14/2022  -      Alternaria; Future; Expected date: 12/14/2022  -     Aspergillus; Future; Expected date: 12/14/2022  -     Cat; Future; Expected date: 12/14/2022  -     Cockroach; Future; Expected date: 12/14/2022  -     Dog; Future; Expected date: 12/14/2022  -     Allergen-[place]; Future; Expected date: 01/14/2023  -     RAST Allergen for [place]; Future; Expected date: 12/14/2022    Food allergy peanut, tree nut and avocado by Hx)          Plan:     Patient Instructions   Testing  Blood work for allergy testing today       Check [facility] in one week for results or call [phone]       Contact me with questions or concerns       I will contact you if anything needs immediate attention.        Treatment    Increase Flonase (= fluticasone) nasal spray:  2 squirts each nostril twice a day   Remember to aim out toward your ear.   Needs to be used regularly 5-14 days for full effect.    As needed:    Astelin = azelastine nasal spray:    2 squirts each nostril as needed, up to twice a day   Do not snort (because it burns and tastes bad)    Allegra for itching, sneezing    Patanol    I spent a total of 69 minutes on the day of the visit.This includes face to face time and non-face to face time preparing to see the patient (eg, review of tests), obtaining and/or reviewing separately obtained history, documenting clinical information in the electronic or other health record, independently interpreting results and communicating results to the patient/family/caregiver, or care coordinator.          Penelope Fisher MD  Allergy, Asthma & Immunology

## 2022-12-14 NOTE — PROGRESS NOTES
Primary Care  Return/Acute Office Visit - In Person  12/14/2022  Oli Ndhlovu      HPI    Patient is a 52 y.o.   Gisella Bennett  has a past medical history of Acute iritis, Atopic dermatitis, Environmental allergies (03/29/2016), Hypertension, Lower back pain (03/29/2016), Recurrent upper respiratory infection (URI), Trigeminal neuralgia (04/14/2015), Uveitis of right eye (2003, 2018), and Vitamin D deficiency disease (02/16/2015).    Patient presents with   Chief Complaint   Patient presents with    Cough     With chest tightness, heavy breathing       Patient reports intermittent tightness in chest with use of inhaler. Describes feeling of air being heavy. Symptoms associated with dry cough. Began Sunday night  She states that she has used the inhaler about 4x/week       Social History     Social History Narrative    , works in [employer].      Gisella Bennett family history includes Breast cancer in her maternal aunt; Diabetes in her father; Diabetes Mellitus in her father; Eczema in her brother, daughter, and another family member; Heart disease (age of onset: 56) in her father; Hypertension in her mother; Kidney disease in her father; No Known Problems in her maternal grandfather, maternal grandmother, paternal grandfather, and paternal grandmother; Sarcoidosis (age of onset: 55) in her cousin; Thyroid disease in her mother.    Active Medications:  Review of patient's allergies indicates:   Allergen Reactions    Codeine Other (See Comments)     Tremors    Nuts [tree nut] Anaphylaxis    Avocado (laurus persea) Itching, Nausea And Vomiting and Other (See Comments)     Current Outpatient Medications   Medication Instructions    albuterol (PROVENTIL HFA) 90 mcg/actuation inhaler 2 puffs, Inhalation, Every 6 hours PRN, Rescue    amLODIPine (NORVASC) 5 MG tablet TAKE 1 TABLET(5 MG) BY MOUTH EVERY DAY    azelastine (ASTELIN) 137 mcg, Nasal, 2 times daily PRN    EPIPEN 2-CHIDI 0.3 mg/0.3 mL  "(1:1,000) AtIn No dose, route, or frequency recorded.    fexofenadine (ALLEGRA) 180 mg, Oral, Daily    fluticasone propionate (FLONASE) 100 mcg, Each Nostril, 2 times daily    hydroCHLOROthiazide (MICROZIDE) 12.5 mg capsule TAKE 1 CAPSULE(12.5 MG) BY MOUTH EVERY DAY    lifitegrast (XIIDRA) 5 % Dpet 1 drop, Ophthalmic, Every 12 hours    meloxicam (MOBIC) 15 mg, Oral    montelukast (SINGULAIR) 10 mg tablet TAKE 1 TABLET(10 MG) BY MOUTH EVERY DAY    multivitamin (THERAGRAN) per tablet 1 tablet, Oral, Daily    omega-3 fatty acids/fish oil (FISH OIL-OMEGA-3 FATTY ACIDS) 300-1,000 mg capsule Oral, Daily    PFIZER COVID-19 MAKI VACCN,PF, 30 mcg/0.3 mL injection No dose, route, or frequency recorded.       Review of Systems   Respiratory:  Positive for cough and shortness of breath.    Cardiovascular:  Positive for chest pain.     Vitals:    12/14/22 1124   BP: 120/88   BP Location: Right arm   Pulse: 90   Temp: 98.3 °F (36.8 °C)   SpO2: 99%   Weight: 102.3 kg (225 lb 8.5 oz)   Height: 5' 4" (1.626 m)       Physical Exam  Vitals reviewed.   Constitutional:       General: She is not in acute distress.  Cardiovascular:      Rate and Rhythm: Normal rate and regular rhythm.      Pulses: Normal pulses.      Heart sounds: Normal heart sounds.   Pulmonary:      Effort: Pulmonary effort is normal.      Breath sounds: Normal breath sounds.   Abdominal:      General: Abdomen is flat. Bowel sounds are normal.      Palpations: Abdomen is soft.   Musculoskeletal:      Right lower leg: No edema.      Left lower leg: No edema.   Neurological:      General: No focal deficit present.      Mental Status: She is oriented to person, place, and time.        Assessment and Plan     Chest pressure   Likely related to asthma. Lungs CTA b/l on exam and she is saturating well on room air. No lower extremity symptoms to suggest VTE.   EKG showed NSR with no ST changes  Continue albuterol prn         Orders Placed This Visit  Orders Placed This " Encounter   Procedures    IN OFFICE EKG 12-LEAD (to [place])           Upcoming Scheduled Appointments and Follow Up:    Future Appointments   Date Time Provider Department Center   12/16/2022  7:30 AM LAB, [place] [facility]YSON Ernst   1/4/2023  9:40 AM Penelope Fisher MD [facility]   4/6/2023  8:30 AM Yudi Sales MD [facility]       Follow Up [facility] (with who? when?): No follow-ups on file.      Extended Emergency Contact Information  Primary Emergency Contact: Jyoti Sanchez   [facility]  Home Phone: [phone]  Relation: Relative  Secondary Emergency Contact: Sara Bennett  Address: [address] [place] [address]  Home Phone: [phone]  Mobile Phone: [phone]  Relation: Spouse      Oli Vale MD   Attending Physician  Primary Care  12/14/2022 - 11:52 AM    I spent a total of 27 minutes on the day of the visit.This includes face to face time and non-face to face time preparing to see the patient (eg, review of tests), obtaining and/or reviewing separately obtained history, documenting clinical information in the electronic or other health record, independently interpreting results and communicating results to the patient/family/caregiver, or care coordinator.

## 2022-12-14 NOTE — PATIENT INSTRUCTIONS
Testing  Blood work for allergy testing today       Check [facility] in one week for results or call [phone]       Contact me with questions or concerns       I will contact you if anything needs immediate attention.        Treatment    Increase Flonase (= fluticasone) nasal spray:  2 squirts each nostril twice a day   Remember to aim out toward your ear.   Needs to be used regularly 5-14 days for full effect.    As needed:    Astelin = azelastine nasal spray:    2 squirts each nostril as needed, up to twice a day   Do not snort (because it burns and tastes bad)    Allegra for itching, sneezing    Patanol

## 2022-12-16 ENCOUNTER — LAB VISIT (OUTPATIENT)
Dept: LAB | Facility: HOSPITAL | Age: 52
End: 2022-12-16
Attending: STUDENT IN AN ORGANIZED HEALTH CARE EDUCATION/TRAINING PROGRAM
Payer: COMMERCIAL

## 2022-12-16 DIAGNOSIS — J31.0 CHRONIC RHINITIS: ICD-10-CM

## 2022-12-16 PROCEDURE — 86003 ALLG SPEC IGE CRUDE XTRC EA: CPT | Mod: 59 | Performed by: STUDENT IN AN ORGANIZED HEALTH CARE EDUCATION/TRAINING PROGRAM

## 2022-12-16 PROCEDURE — 82785 ASSAY OF IGE: CPT | Performed by: STUDENT IN AN ORGANIZED HEALTH CARE EDUCATION/TRAINING PROGRAM

## 2022-12-16 PROCEDURE — 86003 ALLG SPEC IGE CRUDE XTRC EA: CPT | Performed by: STUDENT IN AN ORGANIZED HEALTH CARE EDUCATION/TRAINING PROGRAM

## 2022-12-16 PROCEDURE — 36415 COLL VENOUS BLD VENIPUNCTURE: CPT | Mod: PN | Performed by: STUDENT IN AN ORGANIZED HEALTH CARE EDUCATION/TRAINING PROGRAM

## 2022-12-19 LAB
A ALTERNATA IGE QN: <0.1 KU/L
A FUMIGATUS IGE QN: <0.1 KU/L
AMER SYCAMORE IGE QN: <0.35 KU/L
BERMUDA GRASS IGE QN: <0.1 KU/L
CAT DANDER IGE QN: <0.1 KU/L
CEDAR IGE QN: <0.1 KU/L
D FARINAE IGE QN: <0.1 KU/L
D PTERONYSS IGE QN: <0.1 KU/L
DEPRECATED A ALTERNATA IGE RAST QL: NORMAL
DEPRECATED A FUMIGATUS IGE RAST QL: NORMAL
DEPRECATED BERMUDA GRASS IGE RAST QL: NORMAL
DEPRECATED CAT DANDER IGE RAST QL: NORMAL
DEPRECATED CEDAR IGE RAST QL: NORMAL
DEPRECATED D FARINAE IGE RAST QL: NORMAL
DEPRECATED D PTERONYSS IGE RAST QL: NORMAL
DEPRECATED DOG DANDER IGE RAST QL: NORMAL
DEPRECATED ENGL PLANTAIN IGE RAST QL: NORMAL
DEPRECATED PECAN/HICK TREE IGE RAST QL: NORMAL
DEPRECATED ROACH IGE RAST QL: NORMAL
DEPRECATED SILVER BIRCH IGE RAST QL: NORMAL
DEPRECATED TIMOTHY IGE RAST QL: NORMAL
DEPRECATED WEST RAGWEED IGE RAST QL: NORMAL
DEPRECATED WHITE OAK IGE RAST QL: NORMAL
DOG DANDER IGE QN: <0.1 KU/L
ENGL PLANTAIN IGE QN: <0.1 KU/L
IGE SERPL-ACNC: <35 IU/ML (ref 0–100)
PECAN/HICK TREE IGE QN: <0.1 KU/L
ROACH IGE QN: <0.1 KU/L
SILVER BIRCH IGE QN: <0.1 KU/L
TIMOTHY IGE QN: <0.1 KU/L
WEST RAGWEED IGE QN: <0.1 KU/L
WHITE OAK IGE QN: <0.1 KU/L

## 2023-01-04 ENCOUNTER — OFFICE VISIT (OUTPATIENT)
Dept: ALLERGY | Facility: CLINIC | Age: 53
End: 2023-01-04
Payer: COMMERCIAL

## 2023-01-04 VITALS
DIASTOLIC BLOOD PRESSURE: 69 MMHG | WEIGHT: 224.63 LBS | BODY MASS INDEX: 38.56 KG/M2 | OXYGEN SATURATION: 92 % | HEART RATE: 97 BPM | SYSTOLIC BLOOD PRESSURE: 114 MMHG

## 2023-01-04 DIAGNOSIS — H73.91 ABNORMAL TYMPANIC MEMBRANE OF RIGHT EAR: ICD-10-CM

## 2023-01-04 DIAGNOSIS — Z91.018 FOOD ALLERGY: ICD-10-CM

## 2023-01-04 DIAGNOSIS — I10 HYPERTENSION, UNSPECIFIED TYPE: ICD-10-CM

## 2023-01-04 DIAGNOSIS — J31.0 CHRONIC RHINITIS: Primary | ICD-10-CM

## 2023-01-04 DIAGNOSIS — H69.90 DYSFUNCTION OF EUSTACHIAN TUBE, UNSPECIFIED LATERALITY: ICD-10-CM

## 2023-01-04 PROCEDURE — 3078F PR MOST RECENT DIASTOLIC BLOOD PRESSURE < 80 MM HG: ICD-10-PCS | Mod: CPTII,S$GLB,, | Performed by: STUDENT IN AN ORGANIZED HEALTH CARE EDUCATION/TRAINING PROGRAM

## 2023-01-04 PROCEDURE — 3008F BODY MASS INDEX DOCD: CPT | Mod: CPTII,S$GLB,, | Performed by: STUDENT IN AN ORGANIZED HEALTH CARE EDUCATION/TRAINING PROGRAM

## 2023-01-04 PROCEDURE — 1159F PR MEDICATION LIST DOCUMENTED IN MEDICAL RECORD: ICD-10-PCS | Mod: CPTII,S$GLB,, | Performed by: STUDENT IN AN ORGANIZED HEALTH CARE EDUCATION/TRAINING PROGRAM

## 2023-01-04 PROCEDURE — 99999 PR PBB SHADOW E&M-EST. PATIENT-LVL IV: CPT | Mod: PBBFAC,,, | Performed by: STUDENT IN AN ORGANIZED HEALTH CARE EDUCATION/TRAINING PROGRAM

## 2023-01-04 PROCEDURE — 99215 PR OFFICE/OUTPT VISIT, EST, LEVL V, 40-54 MIN: ICD-10-PCS | Mod: S$GLB,,, | Performed by: STUDENT IN AN ORGANIZED HEALTH CARE EDUCATION/TRAINING PROGRAM

## 2023-01-04 PROCEDURE — 1160F RVW MEDS BY RX/DR IN RCRD: CPT | Mod: CPTII,S$GLB,, | Performed by: STUDENT IN AN ORGANIZED HEALTH CARE EDUCATION/TRAINING PROGRAM

## 2023-01-04 PROCEDURE — 99999 PR PBB SHADOW E&M-EST. PATIENT-LVL IV: ICD-10-PCS | Mod: PBBFAC,,, | Performed by: STUDENT IN AN ORGANIZED HEALTH CARE EDUCATION/TRAINING PROGRAM

## 2023-01-04 PROCEDURE — 1160F PR REVIEW ALL MEDS BY PRESCRIBER/CLIN PHARMACIST DOCUMENTED: ICD-10-PCS | Mod: CPTII,S$GLB,, | Performed by: STUDENT IN AN ORGANIZED HEALTH CARE EDUCATION/TRAINING PROGRAM

## 2023-01-04 PROCEDURE — 99215 OFFICE O/P EST HI 40 MIN: CPT | Mod: S$GLB,,, | Performed by: STUDENT IN AN ORGANIZED HEALTH CARE EDUCATION/TRAINING PROGRAM

## 2023-01-04 PROCEDURE — 1159F MED LIST DOCD IN RCRD: CPT | Mod: CPTII,S$GLB,, | Performed by: STUDENT IN AN ORGANIZED HEALTH CARE EDUCATION/TRAINING PROGRAM

## 2023-01-04 PROCEDURE — 3074F SYST BP LT 130 MM HG: CPT | Mod: CPTII,S$GLB,, | Performed by: STUDENT IN AN ORGANIZED HEALTH CARE EDUCATION/TRAINING PROGRAM

## 2023-01-04 PROCEDURE — 3078F DIAST BP <80 MM HG: CPT | Mod: CPTII,S$GLB,, | Performed by: STUDENT IN AN ORGANIZED HEALTH CARE EDUCATION/TRAINING PROGRAM

## 2023-01-04 PROCEDURE — 3008F PR BODY MASS INDEX (BMI) DOCUMENTED: ICD-10-PCS | Mod: CPTII,S$GLB,, | Performed by: STUDENT IN AN ORGANIZED HEALTH CARE EDUCATION/TRAINING PROGRAM

## 2023-01-04 PROCEDURE — 3074F PR MOST RECENT SYSTOLIC BLOOD PRESSURE < 130 MM HG: ICD-10-PCS | Mod: CPTII,S$GLB,, | Performed by: STUDENT IN AN ORGANIZED HEALTH CARE EDUCATION/TRAINING PROGRAM

## 2023-01-04 RX ORDER — DIFLUPREDNATE OPHTHALMIC 0.5 MG/ML
EMULSION OPHTHALMIC
COMMUNITY
Start: 2023-01-03

## 2023-01-04 NOTE — PROGRESS NOTES
Allergy Clinic Note  [facility]    This note was created by combination of typed  and M-Modal dictation. Transcription errors may be present.  If there are any questions, please contact me.    Subjective:      Patient ID: Gisella Bennett is a 52 y.o. female.    Chief Complaint: Follow-up      Referring Provider:      History of Present Illness: Gisella Bennett is a 52 y.o. female with suspected eustacian tube dysfunction was seen 1 time previously 12/14/2022.  She returns today to follow up symptoms and test results.  She is here alone, and she is a detailed historian.    Related medications and other interventions  EpiPen  Flonase 2 squirts each nostril twice daily (increased last visit)  Astelin as needed  Allegra as needed  Pataday as needed    1/4/23: Pt reports that ear pain and fullness have resolves but that she noticed an increase in ear itching off Allegra.  Denies tinnitis or hearing changes.  Seeks advice about longterm medication.  Recommended Flonase 1 squirt daily, increase to 2 squirts twice daily at first sign of cvold or sinus infection.  Also recommended Allegra as needed for ear pain.  Suggested a trail off Singulair.    12/14/2022:  At initial visit, client reported that upon return from recent air travel she felt short of breath like the air felt thick or heavy.   She reports chest tightness in her central chest and cough with inhalation.  She is not sure about wheezing.  She denied fever other constitutional symptoms.  She took albuterol which had been prescribed for her previously with benefit.  Overall these symptoms are improving.    Patient's main complaint and reason for referral is intermittent severe ear pain for 2 years.  It occurs more often on the left than the right.  It is intense pressure radiating down the lateral side of her neck.  She admits to sometimes feeling off balance during episodes.  On 1 occasion she reported aches and chills.  She  denies vomiting, hearing loss, or tinnitus.  Symptoms have been unchanged on Flonase 2 squirts daily and azelastine.  This is superimposed on chronic rhinitis as below.    Patient's main rhinitic symptom is nasal congestion.  Additional symptoms include runny nose, postnasal drip, sneezing, itching cough at the level of her throat, and throat clearing.  Symptoms are severe and prevent her from taking walks outside from February until October.  Symptoms are perennial with worsening in the spring (Mardi Gras through summer) and fall.  She is concerned about pollen as a trigger.  She add allergy testing in the past and recalls being positive for oak and sycamore trees.    Other allergic syndromes  1. Given albuterol twice in the past, for bronchitis and also for rhinitis with cough.    2. Tree nut allergy:  Patient states eating pecans causes hand and finger swelling.  She says she is also had gums swelling and tongue swelling.  She avoids all tree nuts  3. Peanut allergy.  Patient states history of peanut anaphylaxis.    4. Avocado allergy:  Patient's say is eating and avocado causes immediate vomiting.  Patient states that contact causes burning.  On 1 occasion and avocado shampoo causes scalp welts.    5. Adalid syndrome           MEDICAL HISTORY      Significant past medical history: dry eye syndrome, HTN  Active Problem List reviewed  ENT surgery:  no    Significant family history: HTN in mother and Heart disease in father  Exposures: dog  Smoking Hx:  Client  reports that she has never smoked. She has never used smokeless tobacco.    Meds:  MAR reviewed    Asthma: ? See HPI  Eczema: yes  Rhinitis: yes -- See HPI  Drug allergy/intolerance:   Venom allergy: no  Latex allergy:  no        REVIEW OF SYSTEMS      CONST: no F/C/NS, no unintentional weight changes  NEURO:  no tremor, no weakness  EYES: no discharge, no pruritus, no erythema  EARS: no hearing loss, no sensation of fullness  NOSE: + congestion, no  rhinorrhea, no sneezing  PULM:  no SOB, no wheezing, no cough  CV: no CP, no palpitations, no leg swelling  GI:  no abdominal pain, no blood in stool  :  no dysuria, no hematuria  DERM: no rashes, no skin breaks    Objective:     Physical Exam:     /69 (BP Location: Right arm, Patient Position: Sitting, BP Method: Large (Automatic))   Pulse 97   Wt 101.9 kg (224 lb 10.4 oz)   LMP 04/26/2022   SpO2 (!) 92%   BMI 38.56 kg/m²   GEN: Awake and alert, no distress  DERM: No rashes or flushing  EYE:  No occular discharge  HEENT: No nasal discharge, no hoarseness, TMs are clear bilaterally.  Nares are pink with no significant turbinate swelling.  Oropharynx is benign without exudate.  Tongue is not coated.  PULM: Normal work of breathing, no cough,   NEURO:  No focal deficit, speech fluent and logical  PSYCH: appropriate affect, normal behavior        Allergy Testing      Immunocap negative, 2022      Lab results      Total serum IgE < .35      Imaging and other diagnostics            Medical records review          ASSESSMENT & PLAN       Diagnoses:     Gisella Bennett is a 52 y.o. female. with  1. Chronic rhinitis with negative Immunocaps    2. Dysfunction of Eustachian tube, unspecified laterality    3. Food allergy peanut, tree nut and avocado (by Hx)    4. Hypertension, unspecified type    5. Abnormal tympanic membrane of right ear            Medical Decision making:   Client's ear pain consistent with eustachian tube dysfunction has resolved on 2 weeks of high dose nasal steroid.  Recommend returning to 1 squirt daily for chronic rhinitis with nasal congestion increase to 2 squirts twice daily at first sign of ear pain, cold, or sinus infection.  Also recommended Allegra as needed for ear pain.  Suggested a trial off Singulair.     Discussed PG and triggers of ERINN.  Suggested continuing Flonase a t lower dose to reduce nasal sensitivity.          Chronic rhinitis with negative  Immunocaps  Dysfunction of Eustachian tube, unspecified laterality  -- resolved      Other allergic syndromes  Food allergy peanut, tree nut and avocado (by Hx)  Occasional bronchospasm  Atopic dermatitis  Adalid syndrome    Comorbidities  Hypertension    Plan:     Patient Instructions   Testing  None now        Treatment    Flonase (= fluticasone) nasal spray:    When well:  1 squirt each nostril twice a day  At first sign of ear pain, cold or sinus infection, increase to 2 squirts twice daily   Remember to aim out toward your ear.   Needs to be used regularly 5-14 days for full effect.    Allegra as needed for ear itching or other itching    Try stopping Singulair = montelukast.      Return if needed            Penelope Fisher MD  Allergy, Asthma & Immunology        I spent a total of 57 minutes on the day of the visit.This includes face to face time and non-face to face time preparing to see the patient (eg, review of tests), obtaining and/or reviewing separately obtained history, documenting clinical information in the electronic or other health record, independently interpreting results and communicating results to the patient/family/caregiver, or care coordinator.

## 2023-01-04 NOTE — PATIENT INSTRUCTIONS
Testing  None now        Treatment    Flonase (= fluticasone) nasal spray:    When well:  1 squirt each nostril twice a day  At first sign of ear pain, cold or sinus infection, increase to 2 squirts twice daily   Remember to aim out toward your ear.   Needs to be used regularly 5-14 days for full effect.    Allegra as needed for ear itching or other itching    Try stopping Singulair = montelukast.      Return if needed

## 2023-01-25 ENCOUNTER — PATIENT MESSAGE (OUTPATIENT)
Dept: ADMINISTRATIVE | Facility: HOSPITAL | Age: 53
End: 2023-01-25
Payer: COMMERCIAL

## 2023-03-09 ENCOUNTER — PATIENT MESSAGE (OUTPATIENT)
Dept: PRIMARY CARE CLINIC | Facility: CLINIC | Age: 53
End: 2023-03-09
Payer: COMMERCIAL

## 2023-03-09 DIAGNOSIS — I10 ESSENTIAL HYPERTENSION: ICD-10-CM

## 2023-03-09 RX ORDER — AMLODIPINE BESYLATE 5 MG/1
5 TABLET ORAL DAILY
Qty: 90 TABLET | Refills: 0 | Status: SHIPPED | OUTPATIENT
Start: 2023-03-09 | End: 2023-04-06 | Stop reason: SDUPTHER

## 2023-03-09 RX ORDER — HYDROCHLOROTHIAZIDE 12.5 MG/1
12.5 CAPSULE ORAL DAILY
Qty: 90 CAPSULE | Refills: 0 | Status: SHIPPED | OUTPATIENT
Start: 2023-03-09 | End: 2023-04-06 | Stop reason: SDUPTHER

## 2023-04-01 ENCOUNTER — OFFICE VISIT (OUTPATIENT)
Dept: URGENT CARE | Facility: CLINIC | Age: 53
End: 2023-04-01
Payer: COMMERCIAL

## 2023-04-01 VITALS
DIASTOLIC BLOOD PRESSURE: 76 MMHG | HEIGHT: 64 IN | OXYGEN SATURATION: 98 % | BODY MASS INDEX: 35.85 KG/M2 | HEART RATE: 76 BPM | SYSTOLIC BLOOD PRESSURE: 118 MMHG | TEMPERATURE: 98 F | WEIGHT: 210 LBS | RESPIRATION RATE: 14 BRPM

## 2023-04-01 DIAGNOSIS — L29.0 RECTAL ITCHING: Primary | ICD-10-CM

## 2023-04-01 PROCEDURE — 99213 OFFICE O/P EST LOW 20 MIN: CPT | Mod: S$GLB,,, | Performed by: NURSE PRACTITIONER

## 2023-04-01 PROCEDURE — 99213 PR OFFICE/OUTPT VISIT, EST, LEVL III, 20-29 MIN: ICD-10-PCS | Mod: S$GLB,,, | Performed by: NURSE PRACTITIONER

## 2023-04-01 RX ORDER — LIDOCAINE HYDROCHLORIDE AND HYDROCORTISONE ACETATE 30; 5 MG/G; MG/G
1 CREAM RECTAL 2 TIMES DAILY PRN
Qty: 7 G | Refills: 0 | Status: SHIPPED | OUTPATIENT
Start: 2023-04-01 | End: 2023-04-06

## 2023-04-01 NOTE — PROGRESS NOTES
"Subjective:      Patient ID: Gisella Bennett is a 52 y.o. female.    Vitals:  height is 5' 4" (1.626 m) and weight is 95.3 kg (210 lb). Her tympanic temperature is 97.5 °F (36.4 °C). Her blood pressure is 118/76 and her pulse is 76. Her respiration is 14 and oxygen saturation is 98%.     Chief Complaint: Anal Itching    52 y.o female presents with rectal pruritus for 2 1/2 weeks. Reports increased pruritus in morning and at night. Notices some discomfort with sitting. Feels rectal fullness that improves but does not completely resolve with BM. States that she does eat raw oysters, but no other raw seafood. Does not eat beef or pork. Is not around any children. States that her mother recently moved in with her. Denies contact with anyone with similar symptoms. Using preparation H wipes and topical hydrocortisone with little change. No history of hemorrhoids.     Past Medical History:   Diagnosis Date    Acute iritis     Allergy     Atopic dermatitis     Environmental allergies 03/29/2016    Hypertension     Lower back pain 03/29/2016    Recurrent upper respiratory infection (URI)     Trigeminal neuralgia 04/14/2015    Uveitis of right eye 2003, 2018    Vitamin D deficiency disease 02/16/2015     Past Surgical History:   Procedure Laterality Date    BUNIONECTOMY Left     CHOLECYSTECTOMY      INTRAUTERINE DEVICE INSERTION  2013    MIRENA    LAPAROSCOPIC CHOLECYSTECTOMY N/A 5/13/2021    Procedure: CHOLECYSTECTOMY, LAPAROSCOPIC;  Surgeon: Deborah Agosto MD;  Location: [facility] OR [address] [place] [address];  Service: General;  Laterality: N/A;    SPINAL FUSION  04/2013    Lumbar Spinal fusion, laminectomies       Constitution: Negative for chills, fatigue and fever.   Gastrointestinal:  Positive for constipation and rectal pain. Negative for abdominal pain, abdominal bloating, nausea, vomiting, diarrhea, bright red blood in stool, dark colored stools, rectal bleeding, hemorrhoids and bowel incontinence.   Genitourinary:  " Negative for dysuria, frequency, urgency, flank pain, hematuria, vaginal discharge, vaginal odor and genital sore.    Objective:     Physical Exam   Constitutional: She does not appear ill. No distress.   Cardiovascular: Normal rate, regular rhythm, normal heart sounds and normal pulses.   Pulmonary/Chest: Effort normal and breath sounds normal.   Abdominal: Normal appearance and bowel sounds are normal. She exhibits no distension. Soft. There is no abdominal tenderness. There is no left CVA tenderness and no right CVA tenderness.   Neurological: She is alert.   Nursing note and vitals reviewed.    Rectal exam: no tenderness noted, external hemorrhoids noted. No erythema or fissures noted.    Assessment:     1. Rectal itching        Plan:       Rectal itching  -     Stool Exam-Ova,Cysts,Parasites; Future; Expected date: 04/01/2023  -     LIDOcaine HCl-hydrocortison ac 3-0.5 % Crea; Place 1 application rectally 2 (two) times daily as needed (itching, pain).  Dispense: 7 g; Refill: 0    Advised patient that her symptoms are most likely due to hemorrhoids. Increase fluid and fiber intake to prevent constipation. Sitz baths. Apply cream to area as prescribed. Will check for ova/parasites. Discussed scotch tape test for pinworms in AM. Follow up with PCP for continued or worsening symptoms.

## 2023-04-01 NOTE — PATIENT INSTRUCTIONS
Start topical rectal cream as prescribed  Increase water and fiber intake to help with constipation   Go to the lab for stool study  Follow up with PCP for continued symptoms

## 2023-04-06 ENCOUNTER — OFFICE VISIT (OUTPATIENT)
Dept: PRIMARY CARE CLINIC | Facility: CLINIC | Age: 53
End: 2023-04-06
Payer: COMMERCIAL

## 2023-04-06 ENCOUNTER — LAB VISIT (OUTPATIENT)
Dept: LAB | Facility: HOSPITAL | Age: 53
End: 2023-04-06
Attending: INTERNAL MEDICINE
Payer: COMMERCIAL

## 2023-04-06 VITALS
WEIGHT: 212.94 LBS | SYSTOLIC BLOOD PRESSURE: 110 MMHG | HEART RATE: 98 BPM | BODY MASS INDEX: 36.35 KG/M2 | HEIGHT: 64 IN | DIASTOLIC BLOOD PRESSURE: 73 MMHG | TEMPERATURE: 98 F | OXYGEN SATURATION: 99 % | RESPIRATION RATE: 18 BRPM

## 2023-04-06 DIAGNOSIS — J45.20 MILD INTERMITTENT ASTHMA WITHOUT COMPLICATION: ICD-10-CM

## 2023-04-06 DIAGNOSIS — I10 ESSENTIAL HYPERTENSION: ICD-10-CM

## 2023-04-06 DIAGNOSIS — R53.83 FATIGUE, UNSPECIFIED TYPE: ICD-10-CM

## 2023-04-06 DIAGNOSIS — E66.01 SEVERE OBESITY (BMI 35.0-39.9) WITH COMORBIDITY: ICD-10-CM

## 2023-04-06 DIAGNOSIS — Z00.00 ROUTINE GENERAL MEDICAL EXAMINATION AT A HEALTH CARE FACILITY: Primary | ICD-10-CM

## 2023-04-06 DIAGNOSIS — K21.9 LARYNGOPHARYNGEAL REFLUX (LPR): ICD-10-CM

## 2023-04-06 DIAGNOSIS — Z00.00 ROUTINE GENERAL MEDICAL EXAMINATION AT A HEALTH CARE FACILITY: ICD-10-CM

## 2023-04-06 DIAGNOSIS — J30.89 PERENNIAL ALLERGIC RHINITIS: ICD-10-CM

## 2023-04-06 LAB
ALBUMIN SERPL BCP-MCNC: 3.7 G/DL (ref 3.5–5.2)
ALP SERPL-CCNC: 112 U/L (ref 55–135)
ALT SERPL W/O P-5'-P-CCNC: 24 U/L (ref 10–44)
ANION GAP SERPL CALC-SCNC: 11 MMOL/L (ref 8–16)
AST SERPL-CCNC: 23 U/L (ref 10–40)
BASOPHILS # BLD AUTO: 0.07 K/UL (ref 0–0.2)
BASOPHILS NFR BLD: 1.3 % (ref 0–1.9)
BILIRUB SERPL-MCNC: 0.5 MG/DL (ref 0.1–1)
BUN SERPL-MCNC: 10 MG/DL (ref 6–20)
CALCIUM SERPL-MCNC: 9.9 MG/DL (ref 8.7–10.5)
CHLORIDE SERPL-SCNC: 107 MMOL/L (ref 95–110)
CHOLEST SERPL-MCNC: 198 MG/DL (ref 120–199)
CHOLEST/HDLC SERPL: 2.3 {RATIO} (ref 2–5)
CO2 SERPL-SCNC: 23 MMOL/L (ref 23–29)
CREAT SERPL-MCNC: 0.8 MG/DL (ref 0.5–1.4)
DIFFERENTIAL METHOD: ABNORMAL
EOSINOPHIL # BLD AUTO: 0.2 K/UL (ref 0–0.5)
EOSINOPHIL NFR BLD: 3.1 % (ref 0–8)
ERYTHROCYTE [DISTWIDTH] IN BLOOD BY AUTOMATED COUNT: 15.1 % (ref 11.5–14.5)
EST. GFR  (NO RACE VARIABLE): >60 ML/MIN/1.73 M^2
ESTIMATED AVG GLUCOSE: 108 MG/DL (ref 68–131)
GLUCOSE SERPL-MCNC: 78 MG/DL (ref 70–110)
HBA1C MFR BLD: 5.4 % (ref 4–5.6)
HCT VFR BLD AUTO: 37.5 % (ref 37–48.5)
HDLC SERPL-MCNC: 85 MG/DL (ref 40–75)
HDLC SERPL: 42.9 % (ref 20–50)
HGB BLD-MCNC: 11.7 G/DL (ref 12–16)
IMM GRANULOCYTES # BLD AUTO: 0.01 K/UL (ref 0–0.04)
IMM GRANULOCYTES NFR BLD AUTO: 0.2 % (ref 0–0.5)
LDLC SERPL CALC-MCNC: 101.6 MG/DL (ref 63–159)
LYMPHOCYTES # BLD AUTO: 2.2 K/UL (ref 1–4.8)
LYMPHOCYTES NFR BLD: 40.4 % (ref 18–48)
MCH RBC QN AUTO: 26.8 PG (ref 27–31)
MCHC RBC AUTO-ENTMCNC: 31.2 G/DL (ref 32–36)
MCV RBC AUTO: 86 FL (ref 82–98)
MONOCYTES # BLD AUTO: 0.5 K/UL (ref 0.3–1)
MONOCYTES NFR BLD: 8.4 % (ref 4–15)
NEUTROPHILS # BLD AUTO: 2.6 K/UL (ref 1.8–7.7)
NEUTROPHILS NFR BLD: 46.6 % (ref 38–73)
NONHDLC SERPL-MCNC: 113 MG/DL
NRBC BLD-RTO: 0 /100 WBC
PLATELET # BLD AUTO: 345 K/UL (ref 150–450)
PMV BLD AUTO: 10.5 FL (ref 9.2–12.9)
POTASSIUM SERPL-SCNC: 4.1 MMOL/L (ref 3.5–5.1)
PROT SERPL-MCNC: 7.8 G/DL (ref 6–8.4)
RBC # BLD AUTO: 4.37 M/UL (ref 4–5.4)
SODIUM SERPL-SCNC: 141 MMOL/L (ref 136–145)
TRIGL SERPL-MCNC: 57 MG/DL (ref 30–150)
TSH SERPL DL<=0.005 MIU/L-ACNC: 0.78 UIU/ML (ref 0.4–4)
WBC # BLD AUTO: 5.5 K/UL (ref 3.9–12.7)

## 2023-04-06 PROCEDURE — 1160F PR REVIEW ALL MEDS BY PRESCRIBER/CLIN PHARMACIST DOCUMENTED: ICD-10-PCS | Mod: CPTII,S$GLB,, | Performed by: INTERNAL MEDICINE

## 2023-04-06 PROCEDURE — 3008F BODY MASS INDEX DOCD: CPT | Mod: CPTII,S$GLB,, | Performed by: INTERNAL MEDICINE

## 2023-04-06 PROCEDURE — 80061 LIPID PANEL: CPT | Performed by: INTERNAL MEDICINE

## 2023-04-06 PROCEDURE — 3074F PR MOST RECENT SYSTOLIC BLOOD PRESSURE < 130 MM HG: ICD-10-PCS | Mod: CPTII,S$GLB,, | Performed by: INTERNAL MEDICINE

## 2023-04-06 PROCEDURE — 99999 PR PBB SHADOW E&M-EST. PATIENT-LVL V: ICD-10-PCS | Mod: PBBFAC,,, | Performed by: INTERNAL MEDICINE

## 2023-04-06 PROCEDURE — 99396 PREV VISIT EST AGE 40-64: CPT | Mod: S$GLB,,, | Performed by: INTERNAL MEDICINE

## 2023-04-06 PROCEDURE — 3078F DIAST BP <80 MM HG: CPT | Mod: CPTII,S$GLB,, | Performed by: INTERNAL MEDICINE

## 2023-04-06 PROCEDURE — 3074F SYST BP LT 130 MM HG: CPT | Mod: CPTII,S$GLB,, | Performed by: INTERNAL MEDICINE

## 2023-04-06 PROCEDURE — 1160F RVW MEDS BY RX/DR IN RCRD: CPT | Mod: CPTII,S$GLB,, | Performed by: INTERNAL MEDICINE

## 2023-04-06 PROCEDURE — 1159F PR MEDICATION LIST DOCUMENTED IN MEDICAL RECORD: ICD-10-PCS | Mod: CPTII,S$GLB,, | Performed by: INTERNAL MEDICINE

## 2023-04-06 PROCEDURE — 1159F MED LIST DOCD IN RCRD: CPT | Mod: CPTII,S$GLB,, | Performed by: INTERNAL MEDICINE

## 2023-04-06 PROCEDURE — 3008F PR BODY MASS INDEX (BMI) DOCUMENTED: ICD-10-PCS | Mod: CPTII,S$GLB,, | Performed by: INTERNAL MEDICINE

## 2023-04-06 PROCEDURE — 84443 ASSAY THYROID STIM HORMONE: CPT | Performed by: INTERNAL MEDICINE

## 2023-04-06 PROCEDURE — 3078F PR MOST RECENT DIASTOLIC BLOOD PRESSURE < 80 MM HG: ICD-10-PCS | Mod: CPTII,S$GLB,, | Performed by: INTERNAL MEDICINE

## 2023-04-06 PROCEDURE — 85025 COMPLETE CBC W/AUTO DIFF WBC: CPT | Performed by: INTERNAL MEDICINE

## 2023-04-06 PROCEDURE — 83036 HEMOGLOBIN GLYCOSYLATED A1C: CPT | Performed by: INTERNAL MEDICINE

## 2023-04-06 PROCEDURE — 36415 COLL VENOUS BLD VENIPUNCTURE: CPT | Mod: PN | Performed by: INTERNAL MEDICINE

## 2023-04-06 PROCEDURE — 99999 PR PBB SHADOW E&M-EST. PATIENT-LVL V: CPT | Mod: PBBFAC,,, | Performed by: INTERNAL MEDICINE

## 2023-04-06 PROCEDURE — 99396 PR PREVENTIVE VISIT,EST,40-64: ICD-10-PCS | Mod: S$GLB,,, | Performed by: INTERNAL MEDICINE

## 2023-04-06 PROCEDURE — 80053 COMPREHEN METABOLIC PANEL: CPT | Performed by: INTERNAL MEDICINE

## 2023-04-06 RX ORDER — HYDROCHLOROTHIAZIDE 12.5 MG/1
12.5 CAPSULE ORAL DAILY
Qty: 90 CAPSULE | Refills: 3 | Status: SHIPPED | OUTPATIENT
Start: 2023-04-06

## 2023-04-06 RX ORDER — MONTELUKAST SODIUM 10 MG/1
10 TABLET ORAL DAILY
Qty: 90 TABLET | Refills: 3 | Status: SHIPPED | OUTPATIENT
Start: 2023-04-06

## 2023-04-06 RX ORDER — AMLODIPINE BESYLATE 5 MG/1
5 TABLET ORAL DAILY
Qty: 90 TABLET | Refills: 3 | Status: SHIPPED | OUTPATIENT
Start: 2023-04-06

## 2023-04-06 NOTE — PATIENT INSTRUCTIONS
Labs are fasting. Please do not eat or drink anything other than water for 8-10 hrs prior to your lab work.    12 months for well visit or sooner if needed.       Follow with GYN for female health & cancer prevention    ==============================================================  I'd like to advise you on current CANCER SCREENING recommendations:  ~~~~~~~~~~~~~~~~~~~~~~~~~~~~~~~~~~~~~~~~~~~~~~~~~~~~~~~~~~~~~  PAP SMEAR to evaluate for cervical cancer screening  Every 3 years (or 30 - 64 yo, every 5 years with HPV co-testing).   MAMMOGRAM  every 1-2 years, from 50 - 74 years old. We can discuss your risk at 40 & determine whether to get mammogram sooner  Of course, I can perform this sooner if there is family history of cancer, or if you have problems or questions    ==============================  RECOMMENDATIONS FOR FEMALES  ==============================  Your #1 MEDICINE is DAILY EXERCISE - 15-20 minutes of huffing & puffing EVERY DAY.     Prevent the #1 cause of death- cardiovascular disease (HEART ATTACK & STROKE) by checking for normal blood pressure, cholesterol, sugars, & by not smoking.     VACCINES: Yearly FLU shot, PNEUMONIA shot after 65,  SHINGLES shot after 50    Screening colonoscopy at AGE  50 & every 10 years to check for COLON CANCER,  one of the most common & preventable cancers (Or FIT kit yearly) Repeat in 3 years if POLYP found     I recommend  high fiber (5 fresh fruits or vegetables daily), low fat diet and aerobic  exercise (huffing/ puffing/ sweating for 20 min straight at least 4 days a week)    ==============================================================

## 2023-04-06 NOTE — PROGRESS NOTES
Subjective:      Patient ID: Gisella Bennett is a 52 y.o. female.    Chief Complaint: Annual Exam and Establish Care      Gisella Bennett is a 52 y.o. female with PMH significant for HTN, seasonal allergies, Uveitis, positive AMALIA, GERD, obesity, chronic shoulder pain.  Presenting today to establish care.     HTN: Office BP is 110/73 . No CP/SOB/lightheadedness/dizziness/palpitations.   The 10-year ASCVD risk score (Jerry ALFARO, et al., 2019) is: 1.7%    Values used to calculate the score:      Age: 52 years      Sex: Female      Is Non- : Yes      Diabetic: No      Tobacco smoker: No      Systolic Blood Pressure: 110 mmHg      Is BP treated: Yes      HDL Cholesterol: 87 mg/dL      Total Cholesterol: 207 mg/dL     Perimenopause: Notes that her cycle is starting to be a little more irregular. Last cycle was in January and small amount of spotting in February. Denies hot flashes but does report increased muscle pain.    Shoulder pain: Working with PT    Obesity: Discussed weight management and weight loss goals. Not interested in medical agents for weight loss management.    Mild intermittent asthma with seasonal allergies: Albuterol prn, allegra qam. Does take montelukast qhs but sometimes do not take montelukast if it is not allergy season.     Denies any chest pain, shortness of breath, nausea vomiting constipation diarrhea, blood in stool, heartburn    Review of Systems   Constitutional:  Negative for chills, fever and weight loss.   HENT:  Negative for congestion, ear pain and sore throat.    Eyes:  Negative for double vision.   Respiratory:  Negative for cough and shortness of breath.    Cardiovascular:  Negative for chest pain, palpitations and leg swelling.   Gastrointestinal:  Negative for abdominal pain, heartburn, nausea and vomiting.   Skin:  Negative for rash.   Neurological:  Negative for dizziness, tingling and headaches.   Psychiatric/Behavioral:  Negative for  depression.         Current Outpatient Medications:     albuterol (PROVENTIL HFA) 90 mcg/actuation inhaler, Inhale 2 puffs into the lungs every 6 (six) hours as needed for Wheezing. Rescue, Disp: 8.5 g, Rfl: 11    EPIPEN 2-CHIDI 0.3 mg/0.3 mL (1:1,000) AtIn, , Disp: , Rfl:     fexofenadine (ALLEGRA) 180 MG tablet, Take 1 tablet (180 mg total) by mouth once daily., Disp: 90 tablet, Rfl: 3    fluticasone propionate (FLONASE) 50 mcg/actuation nasal spray, 2 sprays (100 mcg total) by Each Nostril route 2 (two) times daily., Disp: 31.6 mL, Rfl: 5    LIDOcaine HCl-hydrocortison ac 3-0.5 % Crea, Place 1 application rectally 2 (two) times daily as needed (itching, pain)., Disp: 7 g, Rfl: 0    meloxicam (MOBIC) 15 MG tablet, Take 15 mg by mouth., Disp: , Rfl:     multivitamin (THERAGRAN) per tablet, Take 1 tablet by mouth once daily., Disp: , Rfl:     omega-3 fatty acids/fish oil (FISH OIL-OMEGA-3 FATTY ACIDS) 300-1,000 mg capsule, Take by mouth once daily., Disp: , Rfl:     amLODIPine (NORVASC) 5 MG tablet, Take 1 tablet (5 mg total) by mouth once daily., Disp: 90 tablet, Rfl: 3    difluprednate (DUREZOL) 0.05 % Drop ophthalmic solution, Place into both eyes., Disp: , Rfl:     hydroCHLOROthiazide (MICROZIDE) 12.5 mg capsule, Take 1 capsule (12.5 mg total) by mouth once daily., Disp: 90 capsule, Rfl: 3    lifitegrast (XIIDRA) 5 % Dpet, Apply 1 drop to eye every 12 (twelve) hours. (Patient not taking: Reported on 4/6/2023), Disp: 180 each, Rfl: 4    montelukast (SINGULAIR) 10 mg tablet, Take 1 tablet (10 mg total) by mouth once daily., Disp: 90 tablet, Rfl: 3    PFIZER COVID-19 MAKI VACCN,PF, 30 mcg/0.3 mL injection, , Disp: , Rfl:     Lab Results   Component Value Date    HGBA1C 5.4 12/10/2020    HGBA1C 5.4 02/16/2015     No results found for: MICALBCREAT  Lab Results   Component Value Date    LDLCALC 106.2 02/25/2022    LDLCALC 96.8 12/10/2020    CHOL 207 (H) 02/25/2022    HDL 87 (H) 02/25/2022    TRIG 69 02/25/2022       Lab  Results   Component Value Date     02/25/2022    K 3.9 02/25/2022     02/25/2022    CO2 25 02/25/2022    GLU 82 02/25/2022    BUN 13 02/25/2022    CREATININE 0.8 02/25/2022    CALCIUM 9.8 02/25/2022    PROT 7.7 02/25/2022    ALBUMIN 3.5 02/25/2022    BILITOT 0.5 02/25/2022    ALKPHOS 96 02/25/2022    AST 23 02/25/2022    ALT 22 02/25/2022    ANIONGAP 10 02/25/2022    ESTGFRAFRICA >60.0 02/25/2022    EGFRNONAA >60.0 02/25/2022    WBC 5.86 03/24/2022    HGB 11.8 (L) 03/24/2022    HGB 12.2 02/25/2022    HCT 37.1 03/24/2022    MCV 91 03/24/2022     03/24/2022    TSH 0.783 03/04/2019    HEPCAB Negative 12/10/2020       Lab Results   Component Value Date    FSH 12.00 12/30/2019    HPFGTRZW82GG >155 (H) 12/10/2020    IAFNJMNC26DI 26 (L) 06/05/2018    DBNFOIPH07 >2000 (H) 02/25/2022    FERRITIN 66 02/25/2022    IRON 58 02/25/2022    TRANSFERRIN 297 02/25/2022    TIBC 440 02/25/2022    FESATURATED 13 (L) 02/25/2022         Past Medical History:   Diagnosis Date    Acute iritis     Allergy     Atopic dermatitis     Environmental allergies 03/29/2016    Hypertension     Lower back pain 03/29/2016    Recurrent upper respiratory infection (URI)     Trigeminal neuralgia 04/14/2015    Uveitis of right eye 2003, 2018    Vitamin D deficiency disease 02/16/2015     Past Surgical History:   Procedure Laterality Date    BUNIONECTOMY Left     CHOLECYSTECTOMY      INTRAUTERINE DEVICE INSERTION  2013    MIRENA    LAPAROSCOPIC CHOLECYSTECTOMY N/A 5/13/2021    Procedure: CHOLECYSTECTOMY, LAPAROSCOPIC;  Surgeon: Deborah Agosto MD;  Location: [facility] OR [address] [place] [address];  Service: General;  Laterality: N/A;    SPINAL FUSION  04/2013    Lumbar Spinal fusion, laminectomies     Social History     Social History Narrative    , works in "[employer].      Family History   Problem Relation Age of Onset    Hypertension Mother     Thyroid disease Mother         Goiter removed    Heart disease Father 56        congenital  "defect, aortic replacement    Kidney disease Father         after Cardiac study     Diabetes Mellitus Father     Diabetes Father     Eczema Brother     Breast cancer Maternal Aunt     No Known Problems Maternal Grandmother     No Known Problems Maternal Grandfather     No Known Problems Paternal Grandmother     No Known Problems Paternal Grandfather     Sarcoidosis Cousin 55        lung involvement    Eczema Other     Eczema Daughter     Asthma Neg Hx     Emphysema Neg Hx     Blindness Neg Hx     Glaucoma Neg Hx     Macular degeneration Neg Hx     Retinal detachment Neg Hx     Inflammatory bowel disease Neg Hx     Lupus Neg Hx     Psoriasis Neg Hx     Rheum arthritis Neg Hx     Colon cancer Neg Hx     Ovarian cancer Neg Hx     Heart attacks under age 50 Neg Hx      Vitals:    04/06/23 0820   BP: 110/73   Pulse: 98   Resp: 18   Temp: 98 °F (36.7 °C)   SpO2: 99%   Weight: 96.6 kg (212 lb 15.4 oz)   Height: 5' 4" (1.626 m)   PainSc: 0-No pain     Objective:   Physical Exam  Vitals reviewed.   Constitutional:       Appearance: Normal appearance.   HENT:      Head: Normocephalic.      Right Ear: Tympanic membrane, ear canal and external ear normal.      Left Ear: Tympanic membrane, ear canal and external ear normal.      Nose: Nose normal.      Mouth/Throat:      Mouth: Mucous membranes are moist.      Pharynx: Oropharynx is clear.   Eyes:      Conjunctiva/sclera: Conjunctivae normal.      Pupils: Pupils are equal, round, and reactive to light.   Cardiovascular:      Rate and Rhythm: Normal rate and regular rhythm.      Pulses: Normal pulses.   Pulmonary:      Effort: Pulmonary effort is normal.      Breath sounds: Normal breath sounds.   Abdominal:      General: Abdomen is flat. Bowel sounds are normal.      Palpations: Abdomen is soft.   Musculoskeletal:      Cervical back: Neck supple.   Skin:     General: Skin is warm.   Neurological:      General: No focal deficit present.      Mental Status: She is alert. "   Psychiatric:         Mood and Affect: Mood normal.     Assessment/Plan     Gisella Bennett is a 52 y.o.female with:    Routine general medical examination at a health care facility  -     Hemoglobin A1C; Future; Expected date: 04/06/2023  -     CBC Auto Differential; Future; Expected date: 04/06/2023  -     Comprehensive Metabolic Panel; Future; Expected date: 04/06/2023  -     TSH; Future; Expected date: 04/06/2023  -     Mammo Digital Screening Bilat; Future; Expected date: 04/06/2023  -     Lipid Panel; Future; Expected date: 04/06/2023    Severe obesity (BMI 35.0-39.9) with comorbidity  -     Lipid Panel; Future; Expected date: 04/06/2023    Essential hypertension  -     TSH; Future; Expected date: 04/06/2023  -     amLODIPine (NORVASC) 5 MG tablet; Take 1 tablet (5 mg total) by mouth once daily.  Dispense: 90 tablet; Refill: 3  -     hydroCHLOROthiazide (MICROZIDE) 12.5 mg capsule; Take 1 capsule (12.5 mg total) by mouth once daily.  Dispense: 90 capsule; Refill: 3    Mild intermittent asthma without complication    Perennial allergic rhinitis  -     montelukast (SINGULAIR) 10 mg tablet; Take 1 tablet (10 mg total) by mouth once daily.  Dispense: 90 tablet; Refill: 3    Laryngopharyngeal reflux (LPR)    Fatigue, unspecified type         Chronic conditions status updated as per HPI.  Other than changes above, cont current medications and maintain follow up with specialists.  Return to clinic in Follow up in about 1 year (around 4/6/2024).      Yudi Sales MD  [facility]    Patient Instructions   Labs are fasting. Please do not eat or drink anything other than water for 8-10 hrs prior to your lab work.    12 months for well visit or sooner if needed.       Follow with GYN for female health & cancer prevention    ==============================================================  I'd like to advise you on current CANCER SCREENING  recommendations:  ~~~~~~~~~~~~~~~~~~~~~~~~~~~~~~~~~~~~~~~~~~~~~~~~~~~~~~~~~~~~~  PAP SMEAR to evaluate for cervical cancer screening  Every 3 years (or 30 - 66 yo, every 5 years with HPV co-testing).   MAMMOGRAM  every 1-2 years, from 50 - 74 years old. We can discuss your risk at 40 & determine whether to get mammogram sooner  Of course, I can perform this sooner if there is family history of cancer, or if you have problems or questions    ==============================  RECOMMENDATIONS FOR FEMALES  ==============================  Your #1 MEDICINE is DAILY EXERCISE - 15-20 minutes of huffing & puffing EVERY DAY.     Prevent the #1 cause of death- cardiovascular disease (HEART ATTACK & STROKE) by checking for normal blood pressure, cholesterol, sugars, & by not smoking.     VACCINES: Yearly FLU shot, PNEUMONIA shot after 65,  SHINGLES shot after 50    Screening colonoscopy at AGE  50 & every 10 years to check for COLON CANCER,  one of the most common & preventable cancers (Or FIT kit yearly) Repeat in 3 years if POLYP found     I recommend  high fiber (5 fresh fruits or vegetables daily), low fat diet and aerobic  exercise (huffing/ puffing/ sweating for 20 min straight at least 4 days a week)    ==============================================================   Tests to Keep You Healthy    Mammogram: ORDERED BUT NOT SCHEDULED  Colon Cancer Screening: Met on 3/7/2022  Cervical Cancer Screening: Met on 5/11/2018  Last Blood Pressure <= 139/89 (4/6/2023): Yes

## 2023-04-19 ENCOUNTER — TELEPHONE (OUTPATIENT)
Dept: URGENT CARE | Facility: CLINIC | Age: 53
End: 2023-04-19
Payer: COMMERCIAL

## 2023-06-05 ENCOUNTER — HOSPITAL ENCOUNTER (OUTPATIENT)
Dept: RADIOLOGY | Facility: OTHER | Age: 53
Discharge: HOME | End: 2023-06-05
Attending: INTERNAL MEDICINE
Payer: COMMERCIAL

## 2023-06-05 DIAGNOSIS — Z00.00 ROUTINE GENERAL MEDICAL EXAMINATION AT A HEALTH CARE FACILITY: ICD-10-CM

## 2023-06-05 DIAGNOSIS — Z12.31 BREAST CANCER SCREENING BY MAMMOGRAM: ICD-10-CM

## 2023-06-05 PROCEDURE — 77067 SCR MAMMO BI INCL CAD: CPT | Mod: TC

## 2023-06-05 PROCEDURE — 77063 BREAST TOMOSYNTHESIS BI: CPT | Mod: 26,,, | Performed by: RADIOLOGY

## 2023-06-05 PROCEDURE — 77067 MAMMO DIGITAL SCREENING BILAT WITH TOMO: ICD-10-PCS | Mod: 26,,, | Performed by: RADIOLOGY

## 2023-06-05 PROCEDURE — 77067 SCR MAMMO BI INCL CAD: CPT | Mod: 26,,, | Performed by: RADIOLOGY

## 2023-06-05 PROCEDURE — 77063 MAMMO DIGITAL SCREENING BILAT WITH TOMO: ICD-10-PCS | Mod: 26,,, | Performed by: RADIOLOGY

## 2023-06-11 ENCOUNTER — OFFICE VISIT (OUTPATIENT)
Dept: URGENT CARE | Facility: CLINIC | Age: 53
End: 2023-06-11
Payer: COMMERCIAL

## 2023-06-11 VITALS
HEIGHT: 64 IN | DIASTOLIC BLOOD PRESSURE: 76 MMHG | RESPIRATION RATE: 18 BRPM | OXYGEN SATURATION: 100 % | WEIGHT: 212 LBS | HEART RATE: 89 BPM | BODY MASS INDEX: 36.19 KG/M2 | SYSTOLIC BLOOD PRESSURE: 122 MMHG | TEMPERATURE: 98 F

## 2023-06-11 DIAGNOSIS — L02.91 ABSCESS: Primary | ICD-10-CM

## 2023-06-11 PROCEDURE — 99213 OFFICE O/P EST LOW 20 MIN: CPT | Mod: S$GLB,,,

## 2023-06-11 PROCEDURE — 99213 PR OFFICE/OUTPT VISIT, EST, LEVL III, 20-29 MIN: ICD-10-PCS | Mod: S$GLB,,,

## 2023-06-11 RX ORDER — HYDROCHLOROTHIAZIDE 12.5 MG/1
12.5 CAPSULE ORAL
COMMUNITY
End: 2023-10-01 | Stop reason: SDUPTHER

## 2023-06-11 RX ORDER — AMLODIPINE BESYLATE 5 MG/1
5 TABLET ORAL
COMMUNITY
End: 2023-08-23

## 2023-06-11 RX ORDER — MUPIROCIN 20 MG/G
OINTMENT TOPICAL 2 TIMES DAILY
Qty: 15 G | Refills: 0 | Status: SHIPPED | OUTPATIENT
Start: 2023-06-11 | End: 2023-08-23

## 2023-06-11 RX ORDER — SULFAMETHOXAZOLE AND TRIMETHOPRIM 800; 160 MG/1; MG/1
1 TABLET ORAL 2 TIMES DAILY
Qty: 14 TABLET | Refills: 0 | Status: SHIPPED | OUTPATIENT
Start: 2023-06-11 | End: 2023-06-18

## 2023-06-11 NOTE — PATIENT INSTRUCTIONS
Take oral antibiotics as directed for the next 7 days.  Take full dose or symptoms will not get better. Take with food and water to decrease GI upset. Try a probiotic or eat daily yogurt to maintain good gut and vaginal kisha while on an antibiotic. Do not drink alcohol while taking an antibiotic.      Apply antibiotic ointment twice per day x 7 days. Alternate tylenol and ibuprofen every 4 hours as needed for pain. Always take NSAIDs with food and water.     Cleanse wound once per day with gentle soap and water. Do not use peroxide to cleanse as this will disrupt the natural skin healing process. Cover during the day with bandaid to avoid introduction of bacteria. You can leave open to air at night.     Apply warm compresses to the area and should be applied several times per day.     Monitor for worsening signs of infection such as redness, warmth, increase in pain, purulent drainage, fevers, chills, body aches.     Return to the clinic or follow up with PCP if not better/worsening after 2-3 days of antibiotic start.

## 2023-06-11 NOTE — PROGRESS NOTES
"Subjective:      Patient ID: Gisella Bennett is a 52 y.o. female.    Vitals:  height is 5' 4" (1.626 m) and weight is 96.2 kg (212 lb). Her oral temperature is 98.3 °F (36.8 °C). Her blood pressure is 122/76 and her pulse is 89. Her respiration is 18 and oxygen saturation is 100%.     Chief Complaint: Mass    Pt presents today w/ bump on back of neck; onset Wednesday 06/07/23. Pt reports initially was a pimple and it's gotten bigger and has hardened . Pt c/o warmth to the area, swelling and pain. Pt didn't try any treatment at home. No fevers or hx of cysts.     Mass  This is a new problem. The current episode started in the past 7 days. The problem occurs constantly. The problem has been gradually worsening. Associated symptoms include neck pain. Pertinent negatives include no abdominal pain, anorexia, arthralgias, change in bowel habit, chest pain, chills, congestion, coughing, diaphoresis, fatigue, fever, headaches, joint swelling, myalgias, nausea, numbness, rash, sore throat, swollen glands, urinary symptoms, vertigo, visual change, vomiting or weakness. Nothing aggravates the symptoms. She has tried nothing for the symptoms. The treatment provided no relief.     Constitution: Negative for chills, sweating, fatigue and fever.   HENT:  Negative for congestion and sore throat.    Neck: Positive for neck pain.   Cardiovascular:  Negative for chest pain.   Respiratory:  Negative for cough.    Gastrointestinal:  Negative for abdominal pain, nausea and vomiting.   Musculoskeletal:  Negative for joint pain, joint swelling and muscle ache.   Skin:  Positive for abscess. Negative for rash and erythema.   Neurological:  Negative for history of vertigo, headaches and numbness.    Objective:     Physical Exam   Constitutional: She is oriented to person, place, and time. She appears well-developed.   HENT:   Head: Normocephalic and atraumatic. Head is without abrasion, without contusion and without laceration.   Ears: "   Right Ear: External ear normal.   Left Ear: External ear normal.   Nose: Nose normal.   Mouth/Throat: Oropharynx is clear and moist and mucous membranes are normal.   Eyes: Conjunctivae, EOM and lids are normal. Pupils are equal, round, and reactive to light.   Neck: Trachea normal and phonation normal. Neck supple.   Cardiovascular: Normal rate, regular rhythm and normal heart sounds.   Pulmonary/Chest: Effort normal and breath sounds normal. No stridor. No respiratory distress.   Musculoskeletal: Normal range of motion.         General: Normal range of motion.   Neurological: She is alert and oriented to person, place, and time.   Skin: Skin is warm, dry, intact and no rash. Capillary refill takes less than 2 seconds. No abrasion, No burn, No bruising, No erythema and No ecchymosis        Psychiatric: Her speech is normal and behavior is normal. Judgment and thought content normal.   Nursing note and vitals reviewed.    Assessment:     1. Abscess        Plan:       Abscess  -     sulfamethoxazole-trimethoprim 800-160mg (BACTRIM DS) 800-160 mg Tab; Take 1 tablet by mouth 2 (two) times daily. for 7 days  Dispense: 14 tablet; Refill: 0  -     mupirocin (BACTROBAN) 2 % ointment; Apply topically 2 (two) times daily.  Dispense: 15 g; Refill: 0            Discussed results/diagnosis/plan with patient in clinic. Strict precautions given to patient to monitor for worsening signs and symptoms. Advised to follow up with PCP or specialist.  Explained side effects of medications prescribed with patient and informed him/her to discontinue use if he/she has any side effects and to inform UC or PCP if this occurs. All questions answered. Strict ED verses clinic return precautions stressed and given in depth. Advised if symptoms worsens of fail to improve he/she should go to the Emergency Room. Discharge and follow-up instructions given verbally/printed with the patient who expressed understanding and willingness to comply with  my recommendations. Patient voiced understanding and in agreement with current treatment plan. Patient exits the exam room in no acute distress. Conversant and engaged during discharge discussion, verbalized understanding.

## 2023-08-14 ENCOUNTER — PATIENT MESSAGE (OUTPATIENT)
Dept: ADMINISTRATIVE | Facility: HOSPITAL | Age: 53
End: 2023-08-14
Payer: COMMERCIAL

## 2023-08-23 ENCOUNTER — PATIENT OUTREACH (OUTPATIENT)
Dept: ADMINISTRATIVE | Facility: HOSPITAL | Age: 53
End: 2023-08-23
Payer: COMMERCIAL

## 2023-08-23 ENCOUNTER — OFFICE VISIT (OUTPATIENT)
Dept: PRIMARY CARE CLINIC | Facility: CLINIC | Age: 53
End: 2023-08-23
Payer: COMMERCIAL

## 2023-08-23 VITALS
HEART RATE: 94 BPM | BODY MASS INDEX: 36.59 KG/M2 | HEIGHT: 64 IN | WEIGHT: 214.31 LBS | DIASTOLIC BLOOD PRESSURE: 80 MMHG | OXYGEN SATURATION: 98 % | SYSTOLIC BLOOD PRESSURE: 125 MMHG

## 2023-08-23 DIAGNOSIS — J45.21 MILD INTERMITTENT ASTHMA WITH ACUTE EXACERBATION: Primary | ICD-10-CM

## 2023-08-23 PROCEDURE — 99999 PR PBB SHADOW E&M-EST. PATIENT-LVL IV: CPT | Mod: PBBFAC,,, | Performed by: INTERNAL MEDICINE

## 2023-08-23 PROCEDURE — 1159F MED LIST DOCD IN RCRD: CPT | Mod: CPTII,S$GLB,, | Performed by: INTERNAL MEDICINE

## 2023-08-23 PROCEDURE — 3044F PR MOST RECENT HEMOGLOBIN A1C LEVEL <7.0%: ICD-10-PCS | Mod: CPTII,S$GLB,, | Performed by: INTERNAL MEDICINE

## 2023-08-23 PROCEDURE — 3079F PR MOST RECENT DIASTOLIC BLOOD PRESSURE 80-89 MM HG: ICD-10-PCS | Mod: CPTII,S$GLB,, | Performed by: INTERNAL MEDICINE

## 2023-08-23 PROCEDURE — 1160F RVW MEDS BY RX/DR IN RCRD: CPT | Mod: CPTII,S$GLB,, | Performed by: INTERNAL MEDICINE

## 2023-08-23 PROCEDURE — 3008F BODY MASS INDEX DOCD: CPT | Mod: CPTII,S$GLB,, | Performed by: INTERNAL MEDICINE

## 2023-08-23 PROCEDURE — 3008F PR BODY MASS INDEX (BMI) DOCUMENTED: ICD-10-PCS | Mod: CPTII,S$GLB,, | Performed by: INTERNAL MEDICINE

## 2023-08-23 PROCEDURE — 3044F HG A1C LEVEL LT 7.0%: CPT | Mod: CPTII,S$GLB,, | Performed by: INTERNAL MEDICINE

## 2023-08-23 PROCEDURE — 3074F PR MOST RECENT SYSTOLIC BLOOD PRESSURE < 130 MM HG: ICD-10-PCS | Mod: CPTII,S$GLB,, | Performed by: INTERNAL MEDICINE

## 2023-08-23 PROCEDURE — 3074F SYST BP LT 130 MM HG: CPT | Mod: CPTII,S$GLB,, | Performed by: INTERNAL MEDICINE

## 2023-08-23 PROCEDURE — 3079F DIAST BP 80-89 MM HG: CPT | Mod: CPTII,S$GLB,, | Performed by: INTERNAL MEDICINE

## 2023-08-23 PROCEDURE — 99214 OFFICE O/P EST MOD 30 MIN: CPT | Mod: S$GLB,,, | Performed by: INTERNAL MEDICINE

## 2023-08-23 PROCEDURE — 1160F PR REVIEW ALL MEDS BY PRESCRIBER/CLIN PHARMACIST DOCUMENTED: ICD-10-PCS | Mod: CPTII,S$GLB,, | Performed by: INTERNAL MEDICINE

## 2023-08-23 PROCEDURE — 99999 PR PBB SHADOW E&M-EST. PATIENT-LVL IV: ICD-10-PCS | Mod: PBBFAC,,, | Performed by: INTERNAL MEDICINE

## 2023-08-23 PROCEDURE — 1159F PR MEDICATION LIST DOCUMENTED IN MEDICAL RECORD: ICD-10-PCS | Mod: CPTII,S$GLB,, | Performed by: INTERNAL MEDICINE

## 2023-08-23 PROCEDURE — 99214 PR OFFICE/OUTPT VISIT, EST, LEVL IV, 30-39 MIN: ICD-10-PCS | Mod: S$GLB,,, | Performed by: INTERNAL MEDICINE

## 2023-08-23 RX ORDER — PREDNISONE 20 MG/1
TABLET ORAL
Qty: 11 TABLET | Refills: 0 | Status: SHIPPED | OUTPATIENT
Start: 2023-08-23 | End: 2023-09-02

## 2023-08-23 NOTE — PROGRESS NOTES
Subjective:      Patient ID: Gisella Bennett is a 53 y.o. female.    Chief Complaint: Cough (Associated with chest tightness ) and Chest Pain    Gisella Bennett is a 52 y.o. female with PMH significant for HTN, seasonal allergies, Uveitis, positive AMALIA, GERD, obesity, chronic shoulder pain, mild intermittent asthma    Mild intermittent asthma with seasonal allergies and exacerbation: Has been having increasing cough with chest tightness over the past several months. She does use albuterol prn but not frequently, allegra qam. Does take montelukast qhs. Reports increased coughing and feels the need to use albuterol at least once a day which is increased for her.     Denies any chest pain, shortness of breath, nausea vomiting constipation diarrhea, blood in stool, heartburn    Review of Systems   Constitutional:  Negative for chills, fever, malaise/fatigue and weight loss.   HENT:  Negative for congestion, ear pain and sore throat.    Eyes:  Negative for blurred vision and double vision.   Respiratory:  Positive for cough. Negative for shortness of breath.    Cardiovascular:  Negative for chest pain, palpitations, orthopnea, leg swelling and PND.   Gastrointestinal:  Negative for abdominal pain, heartburn, nausea and vomiting.   Musculoskeletal:  Negative for neck pain.   Skin:  Negative for rash.   Neurological:  Negative for dizziness, tingling and headaches.   Psychiatric/Behavioral:  Negative for depression.          Current Outpatient Medications:     amLODIPine (NORVASC) 5 MG tablet, Take 1 tablet (5 mg total) by mouth once daily., Disp: 90 tablet, Rfl: 3    difluprednate (DUREZOL) 0.05 % Drop ophthalmic solution, Place into both eyes., Disp: , Rfl:     EPIPEN 2-CHIDI 0.3 mg/0.3 mL (1:1,000) AtIn, , Disp: , Rfl:     fexofenadine (ALLEGRA) 180 MG tablet, Take 1 tablet (180 mg total) by mouth once daily., Disp: 90 tablet, Rfl: 3    fluticasone propionate (FLONASE) 50 mcg/actuation nasal spray, 2 sprays  "(100 mcg total) by Each Nostril route 2 (two) times daily., Disp: 31.6 mL, Rfl: 5    hydroCHLOROthiazide (MICROZIDE) 12.5 mg capsule, Take 1 capsule (12.5 mg total) by mouth once daily., Disp: 90 capsule, Rfl: 3    montelukast (SINGULAIR) 10 mg tablet, Take 1 tablet (10 mg total) by mouth once daily., Disp: 90 tablet, Rfl: 3    multivitamin (THERAGRAN) per tablet, Take 1 tablet by mouth once daily., Disp: , Rfl:     omega-3 fatty acids/fish oil (FISH OIL-OMEGA-3 FATTY ACIDS) 300-1,000 mg capsule, Take by mouth once daily., Disp: , Rfl:     hydroCHLOROthiazide (MICROZIDE) 12.5 mg capsule, Take 12.5 mg by mouth., Disp: , Rfl:     ipratropium-albuteroL (COMBIVENT)  mcg/actuation inhaler, Inhale 1 puff into the lungs every 4 (four) hours as needed for Wheezing. Rescue, Disp: 4 g, Rfl: 11    lifitegrast (XIIDRA) 5 % Dpet, Apply 1 drop to eye every 12 (twelve) hours. (Patient not taking: Reported on 4/6/2023), Disp: 180 each, Rfl: 4    meloxicam (MOBIC) 15 MG tablet, Take 15 mg by mouth., Disp: , Rfl:     PFIZER COVID-19 MAKI VACCN,PF, 30 mcg/0.3 mL injection, , Disp: , Rfl:     predniSONE (DELTASONE) 20 MG tablet, Take 2 po with breakfast x 3d, thenTake 1 po qd x 3d, thenTake 1/2 po qd x  4d, Disp: 11 tablet, Rfl: 0    Lab Results   Component Value Date    HGBA1C 5.4 04/06/2023    HGBA1C 5.4 12/10/2020    HGBA1C 5.4 02/16/2015     No results found for: "MICALBCREAT"  Lab Results   Component Value Date    LDLCALC 101.6 04/06/2023    LDLCALC 106.2 02/25/2022    CHOL 198 04/06/2023    HDL 85 (H) 04/06/2023    TRIG 57 04/06/2023       Lab Results   Component Value Date     04/06/2023    K 4.1 04/06/2023     04/06/2023    CO2 23 04/06/2023    GLU 78 04/06/2023    BUN 10 04/06/2023    CREATININE 0.8 04/06/2023    CALCIUM 9.9 04/06/2023    PROT 7.8 04/06/2023    ALBUMIN 3.7 04/06/2023    BILITOT 0.5 04/06/2023    ALKPHOS 112 04/06/2023    AST 23 04/06/2023    ALT 24 04/06/2023    ANIONGAP 11 04/06/2023    " ESTGFRAFRICA >60.0 02/25/2022    EGFRNONAA >60.0 02/25/2022    WBC 5.50 04/06/2023    HGB 11.7 (L) 04/06/2023    HGB 11.8 (L) 03/24/2022    HCT 37.5 04/06/2023    MCV 86 04/06/2023     04/06/2023    TSH 0.775 04/06/2023    HEPCAB Negative 12/10/2020       Lab Results   Component Value Date    FSH 12.00 12/30/2019    YPQCMGRM12GR >155 (H) 12/10/2020    JHMWYEUU24CM 26 (L) 06/05/2018    PYZNTLDG30 >2000 (H) 02/25/2022    FERRITIN 66 02/25/2022    IRON 58 02/25/2022    TRANSFERRIN 297 02/25/2022    TIBC 440 02/25/2022    FESATURATED 13 (L) 02/25/2022         Past Medical History:   Diagnosis Date    Acute iritis     Allergy     Atopic dermatitis     Environmental allergies 03/29/2016    Hypertension     Lower back pain 03/29/2016    Recurrent upper respiratory infection (URI)     Trigeminal neuralgia 04/14/2015    Uveitis of right eye 2003, 2018    Vitamin D deficiency disease 02/16/2015     Past Surgical History:   Procedure Laterality Date    BUNIONECTOMY Left     CHOLECYSTECTOMY      INTRAUTERINE DEVICE INSERTION  2013    MIRENA    LAPAROSCOPIC CHOLECYSTECTOMY N/A 5/13/2021    Procedure: CHOLECYSTECTOMY, LAPAROSCOPIC;  Surgeon: Deborah Agosto MD;  Location: [facility] OR [address] [place] [address];  Service: General;  Laterality: N/A;    SPINAL FUSION  04/2013    Lumbar Spinal fusion, laminectomies     Social History     Social History Narrative    , works in [employer].      Family History   Problem Relation Age of Onset    Hypertension Mother     Thyroid disease Mother         Goiter removed    Heart disease Father 56        congenital defect, aortic replacement    Kidney disease Father         after Cardiac study     Diabetes Mellitus Father     Diabetes Father     Eczema Brother     Breast cancer Maternal Aunt     No Known Problems Maternal Grandmother     No Known Problems Maternal Grandfather     No Known Problems Paternal Grandmother     No Known Problems Paternal Grandfather     Sarcoidosis Cousin 55        " lung involvement    Eczema Other     Eczema Daughter     Asthma Neg Hx     Emphysema Neg Hx     Blindness Neg Hx     Glaucoma Neg Hx     Macular degeneration Neg Hx     Retinal detachment Neg Hx     Inflammatory bowel disease Neg Hx     Lupus Neg Hx     Psoriasis Neg Hx     Rheum arthritis Neg Hx     Colon cancer Neg Hx     Ovarian cancer Neg Hx     Heart attacks under age 50 Neg Hx      Vitals:    08/23/23 0818 08/23/23 0833   BP: (!) 136/94 125/80   Pulse: 94    SpO2: 98%    Weight: 97.2 kg (214 lb 4.6 oz)    Height: 5' 4" (1.626 m)    PainSc: 0-No pain      Objective:   Physical Exam  Constitutional:       General: She is not in acute distress.     Appearance: Normal appearance. She is not ill-appearing.   HENT:      Head: Normocephalic.      Right Ear: Tympanic membrane, ear canal and external ear normal.      Left Ear: Tympanic membrane, ear canal and external ear normal.      Nose: No congestion or rhinorrhea.      Mouth/Throat:      Mouth: Mucous membranes are moist.      Pharynx: No oropharyngeal exudate or posterior oropharyngeal erythema.   Eyes:      Extraocular Movements: Extraocular movements intact.      Conjunctiva/sclera: Conjunctivae normal.      Pupils: Pupils are equal, round, and reactive to light.   Cardiovascular:      Rate and Rhythm: Normal rate.   Pulmonary:      Effort: Pulmonary effort is normal. No respiratory distress.      Breath sounds: Normal breath sounds. No wheezing.      Comments: Coarse breath sounds  Chest:      Chest wall: No tenderness.   Musculoskeletal:      Cervical back: Normal range of motion.   Neurological:      Mental Status: She is alert.       Assessment:     1. Mild intermittent asthma with acute exacerbation      Plan:     Orders Placed This Encounter    predniSONE (DELTASONE) 20 MG tablet    ipratropium-albuteroL (COMBIVENT)  mcg/actuation inhaler       There are no Patient Instructions on file for this visit.  Tests to Keep You Healthy    Mammogram: Met on " 6/5/2023  Colon Cancer Screening: Met on 3/7/2022  Cervical Cancer Screening: DUE  Last Blood Pressure <= 139/89 (8/23/2023): Yes                            Answers submitted by the patient for this visit:  High Blood Pressure Questionnaire (Submitted on 8/22/2023)  Chief Complaint: Hypertension  Chronicity: recurrent  Onset: more than 1 year ago  Progression since onset: gradually improving  Condition status: controlled  anxiety: No  peripheral edema: No  sweats: No  Agents associated with hypertension: no associated agents  CAD risks: family history, obesity, stress  Compliance problems: no compliance problems  Past treatments: diuretics, lifestyle changes  Improvement on treatment: mild

## 2023-08-23 NOTE — PROGRESS NOTES
Patient due for the following    COVID-19 Vaccine (5 - Pfizer series)    Cervical Cancer Screening       Immunizations: reviewed and updated  Care Everywhere: triggered  Care Teams: up to date  Outreach: completed

## 2023-09-26 ENCOUNTER — PATIENT MESSAGE (OUTPATIENT)
Dept: PRIMARY CARE CLINIC | Facility: CLINIC | Age: 53
End: 2023-09-26
Payer: COMMERCIAL

## 2023-09-26 DIAGNOSIS — J30.89 PERENNIAL ALLERGIC RHINITIS: ICD-10-CM

## 2023-09-26 RX ORDER — AZELASTINE 1 MG/ML
1 SPRAY, METERED NASAL 2 TIMES DAILY PRN
Qty: 30 ML | Refills: 11 | Status: SHIPPED | OUTPATIENT
Start: 2023-09-26

## 2023-09-26 NOTE — TELEPHONE ENCOUNTER
No care due was identified.  [facility] Embedded Care Due Messages. Reference number: [id number].   9/26/2023 9:28:14 AM CDT

## 2023-10-01 ENCOUNTER — OFFICE VISIT (OUTPATIENT)
Dept: URGENT CARE | Facility: CLINIC | Age: 53
End: 2023-10-01
Payer: COMMERCIAL

## 2023-10-01 VITALS
BODY MASS INDEX: 36.59 KG/M2 | DIASTOLIC BLOOD PRESSURE: 83 MMHG | RESPIRATION RATE: 16 BRPM | TEMPERATURE: 98 F | HEIGHT: 64 IN | SYSTOLIC BLOOD PRESSURE: 119 MMHG | HEART RATE: 71 BPM | OXYGEN SATURATION: 97 % | WEIGHT: 214.31 LBS

## 2023-10-01 DIAGNOSIS — R05.9 COUGH, UNSPECIFIED TYPE: ICD-10-CM

## 2023-10-01 DIAGNOSIS — R09.81 SINUS CONGESTION: ICD-10-CM

## 2023-10-01 DIAGNOSIS — H66.92 LEFT OTITIS MEDIA, UNSPECIFIED OTITIS MEDIA TYPE: Primary | ICD-10-CM

## 2023-10-01 PROCEDURE — 99213 OFFICE O/P EST LOW 20 MIN: CPT | Mod: S$GLB,,, | Performed by: FAMILY MEDICINE

## 2023-10-01 PROCEDURE — 99213 PR OFFICE/OUTPT VISIT, EST, LEVL III, 20-29 MIN: ICD-10-PCS | Mod: S$GLB,,, | Performed by: FAMILY MEDICINE

## 2023-10-01 RX ORDER — AMOXICILLIN AND CLAVULANATE POTASSIUM 875; 125 MG/1; MG/1
1 TABLET, FILM COATED ORAL 2 TIMES DAILY
Qty: 14 TABLET | Refills: 0 | Status: SHIPPED | OUTPATIENT
Start: 2023-10-01 | End: 2023-10-08

## 2023-10-01 NOTE — PROGRESS NOTES
"Subjective:      Patient ID: Gisella Bennett is a 53 y.o. female.    Vitals:  height is 5' 4" (1.626 m) and weight is 97.2 kg (214 lb 4.6 oz). Her oral temperature is 98.4 °F (36.9 °C). Her blood pressure is 119/83 and her pulse is 71. Her respiration is 16 and oxygen saturation is 97%.     Chief Complaint: Ear Problem (Ear pain for more than one week - Entered by patient) and Otalgia (Located to the left ear)    Patient is a 53 y.o. female with the compliant of a cough, sinus congestion and left ear pain that presented about a week ago, she reports with taking aleve for her current symptoms and reports with no relief.  States the symptoms got worse after air travel 2 days ago.  Denies fever, chills, chest pain, SOB, headache, nausea/vomiting.     Otalgia   There is pain in the left ear. This is a new problem. The current episode started 1 to 4 weeks ago. The problem occurs constantly. The problem has been gradually worsening. The pain is at a severity of 7/10. The pain is moderate. Associated symptoms include coughing. Pertinent negatives include no abdominal pain, diarrhea, ear discharge, headaches, hearing loss, neck pain, rash, rhinorrhea, sore throat or vomiting. Treatments tried: Aleve. The treatment provided no relief. There is no history of a chronic ear infection, hearing loss or a tympanostomy tube.       HENT:  Positive for ear pain. Negative for ear discharge, hearing loss and sore throat.    Neck: Negative for neck pain.   Respiratory:  Positive for cough.    Gastrointestinal:  Negative for abdominal pain, vomiting and diarrhea.   Skin:  Negative for rash.   Neurological:  Negative for headaches.      Objective:     Physical Exam   Constitutional: She is oriented to person, place, and time. She appears well-developed. She is cooperative.  Non-toxic appearance. She does not appear ill. No distress.   HENT:   Head: Normocephalic and atraumatic.   Ears:   Right Ear: Hearing, tympanic membrane, " external ear and ear canal normal. impacted cerumen  Left Ear: Hearing, external ear and ear canal normal. impacted cerumen     Comments:   Positive left TM erythema and mild bulging  Nose: Nose normal. No mucosal edema, rhinorrhea or nasal deformity. No epistaxis. Right sinus exhibits no maxillary sinus tenderness and no frontal sinus tenderness. Left sinus exhibits no maxillary sinus tenderness and no frontal sinus tenderness.   Mouth/Throat: Uvula is midline, oropharynx is clear and moist and mucous membranes are normal. No trismus in the jaw. Normal dentition. No uvula swelling. No oropharyngeal exudate, posterior oropharyngeal edema or posterior oropharyngeal erythema.   Eyes: Conjunctivae and lids are normal. No scleral icterus.   Neck: Trachea normal and phonation normal. Neck supple. No edema present. No erythema present. No neck rigidity present.   Cardiovascular: Normal rate, regular rhythm, normal heart sounds and normal pulses.   No murmur heard.  Pulmonary/Chest: Effort normal and breath sounds normal. No stridor. No respiratory distress. She has no decreased breath sounds. She has no wheezes. She has no rhonchi. She has no rales.   Abdominal: Normal appearance. She exhibits no distension. There is no abdominal tenderness.   Musculoskeletal: Normal range of motion.         General: No deformity. Normal range of motion.      Cervical back: She exhibits no tenderness.   Lymphadenopathy:     She has no cervical adenopathy.   Neurological: She is alert and oriented to person, place, and time. She exhibits normal muscle tone. Coordination normal.   Skin: Skin is warm, dry, intact, not diaphoretic and not pale.   Psychiatric: Her speech is normal and behavior is normal. Judgment and thought content normal.   Nursing note and vitals reviewed.      Assessment:     1. Left otitis media, unspecified otitis media type    2. Cough, unspecified type    3. Sinus congestion        Plan:       Left otitis media,  unspecified otitis media type    Cough, unspecified type    Sinus congestion    Other orders  -     amoxicillin-clavulanate 875-125mg (AUGMENTIN) 875-125 mg per tablet; Take 1 tablet by mouth 2 (two) times daily. for 7 days  Dispense: 14 tablet; Refill: 0

## 2023-10-05 ENCOUNTER — PATIENT MESSAGE (OUTPATIENT)
Dept: PRIMARY CARE CLINIC | Facility: CLINIC | Age: 53
End: 2023-10-05

## 2023-10-05 ENCOUNTER — OFFICE VISIT (OUTPATIENT)
Dept: PRIMARY CARE CLINIC | Facility: CLINIC | Age: 53
End: 2023-10-05
Payer: COMMERCIAL

## 2023-10-05 VITALS
HEART RATE: 88 BPM | DIASTOLIC BLOOD PRESSURE: 72 MMHG | BODY MASS INDEX: 37.38 KG/M2 | HEIGHT: 64 IN | OXYGEN SATURATION: 99 % | RESPIRATION RATE: 18 BRPM | WEIGHT: 218.94 LBS | SYSTOLIC BLOOD PRESSURE: 118 MMHG

## 2023-10-05 DIAGNOSIS — K21.9 LARYNGOPHARYNGEAL REFLUX (LPR): ICD-10-CM

## 2023-10-05 DIAGNOSIS — R76.8 ANA POSITIVE: ICD-10-CM

## 2023-10-05 DIAGNOSIS — E55.9 VITAMIN D DEFICIENCY DISEASE: ICD-10-CM

## 2023-10-05 DIAGNOSIS — I10 ESSENTIAL HYPERTENSION: Primary | ICD-10-CM

## 2023-10-05 DIAGNOSIS — E66.01 SEVERE OBESITY (BMI 35.0-39.9) WITH COMORBIDITY: ICD-10-CM

## 2023-10-05 DIAGNOSIS — J45.20 MILD INTERMITTENT ASTHMA WITHOUT COMPLICATION: ICD-10-CM

## 2023-10-05 DIAGNOSIS — H20.9 UVEITIS: ICD-10-CM

## 2023-10-05 DIAGNOSIS — H93.8X9 SENSATION OF FULLNESS IN EAR, UNSPECIFIED LATERALITY: ICD-10-CM

## 2023-10-05 DIAGNOSIS — Z91.09 ENVIRONMENTAL ALLERGIES: ICD-10-CM

## 2023-10-05 PROCEDURE — 3074F SYST BP LT 130 MM HG: CPT | Mod: CPTII,S$GLB,, | Performed by: INTERNAL MEDICINE

## 2023-10-05 PROCEDURE — 1160F RVW MEDS BY RX/DR IN RCRD: CPT | Mod: CPTII,S$GLB,, | Performed by: INTERNAL MEDICINE

## 2023-10-05 PROCEDURE — 3074F PR MOST RECENT SYSTOLIC BLOOD PRESSURE < 130 MM HG: ICD-10-PCS | Mod: CPTII,S$GLB,, | Performed by: INTERNAL MEDICINE

## 2023-10-05 PROCEDURE — 3008F BODY MASS INDEX DOCD: CPT | Mod: CPTII,S$GLB,, | Performed by: INTERNAL MEDICINE

## 2023-10-05 PROCEDURE — 3044F HG A1C LEVEL LT 7.0%: CPT | Mod: CPTII,S$GLB,, | Performed by: INTERNAL MEDICINE

## 2023-10-05 PROCEDURE — 99999 PR PBB SHADOW E&M-EST. PATIENT-LVL V: ICD-10-PCS | Mod: PBBFAC,,, | Performed by: INTERNAL MEDICINE

## 2023-10-05 PROCEDURE — 3078F DIAST BP <80 MM HG: CPT | Mod: CPTII,S$GLB,, | Performed by: INTERNAL MEDICINE

## 2023-10-05 PROCEDURE — 3008F PR BODY MASS INDEX (BMI) DOCUMENTED: ICD-10-PCS | Mod: CPTII,S$GLB,, | Performed by: INTERNAL MEDICINE

## 2023-10-05 PROCEDURE — 1159F PR MEDICATION LIST DOCUMENTED IN MEDICAL RECORD: ICD-10-PCS | Mod: CPTII,S$GLB,, | Performed by: INTERNAL MEDICINE

## 2023-10-05 PROCEDURE — 3078F PR MOST RECENT DIASTOLIC BLOOD PRESSURE < 80 MM HG: ICD-10-PCS | Mod: CPTII,S$GLB,, | Performed by: INTERNAL MEDICINE

## 2023-10-05 PROCEDURE — 3044F PR MOST RECENT HEMOGLOBIN A1C LEVEL <7.0%: ICD-10-PCS | Mod: CPTII,S$GLB,, | Performed by: INTERNAL MEDICINE

## 2023-10-05 PROCEDURE — 99214 OFFICE O/P EST MOD 30 MIN: CPT | Mod: S$GLB,,, | Performed by: INTERNAL MEDICINE

## 2023-10-05 PROCEDURE — 99214 PR OFFICE/OUTPT VISIT, EST, LEVL IV, 30-39 MIN: ICD-10-PCS | Mod: S$GLB,,, | Performed by: INTERNAL MEDICINE

## 2023-10-05 PROCEDURE — 99999 PR PBB SHADOW E&M-EST. PATIENT-LVL V: CPT | Mod: PBBFAC,,, | Performed by: INTERNAL MEDICINE

## 2023-10-05 PROCEDURE — 1160F PR REVIEW ALL MEDS BY PRESCRIBER/CLIN PHARMACIST DOCUMENTED: ICD-10-PCS | Mod: CPTII,S$GLB,, | Performed by: INTERNAL MEDICINE

## 2023-10-05 PROCEDURE — 1159F MED LIST DOCD IN RCRD: CPT | Mod: CPTII,S$GLB,, | Performed by: INTERNAL MEDICINE

## 2023-10-05 NOTE — PROGRESS NOTES
Subjective:      Patient ID: Gisella Bennett is a 53 y.o. female.    Chief Complaint: Follow-up      Gisella Bennett is a 53 y.o. female with chronic conditions significant for HTN, seasonal allergies, Uveitis, positive AMALIA, GERD, obesity, chronic shoulder pain, mild intermittent asthma.  Presenting today for follow up / annual. Date of last annual is 4/6/2023    HTN: Office BP is 118/72 . No CP/SOB/lightheadedness/dizziness/palpitations. Interested in signing up for HTN digital medicine program.      Obesity: Discussed weight management and weight loss goals. Not interested in medical agents for weight loss management.     Mild intermittent asthma with seasonal allergies: Albuterol prn, allegra qam. Does take montelukast qhs but sometimes do not take montelukast if it is not allergy season. Notes intermittent chest tightness, which usually resolves with prn combivent.     Recent otalgia: Reports that she was at  after a flight as she was having ear pain and drainage. She was diagnosed with otitis externa and was given abx at that time. Reports that pain is improving. Would like to see ENT for continued ear drainage and pain, especially with flying.     Denies any chest pain, shortness of breath, nausea vomiting constipation diarrhea, blood in stool, heartburn    Review of Systems   Constitutional:  Negative for chills, fever and weight loss.   HENT:  Negative for congestion, ear pain and sore throat.    Eyes:  Negative for double vision.   Respiratory:  Negative for cough and shortness of breath.    Cardiovascular:  Negative for chest pain, palpitations and leg swelling.   Gastrointestinal:  Negative for abdominal pain, heartburn, nausea and vomiting.   Skin:  Negative for rash.   Neurological:  Negative for dizziness, tingling and headaches.   Psychiatric/Behavioral:  Negative for depression.           Current Outpatient Medications:     amLODIPine (NORVASC) 5 MG tablet, Take 1 tablet (5 mg total) by  "mouth once daily., Disp: 90 tablet, Rfl: 3    amoxicillin-clavulanate 875-125mg (AUGMENTIN) 875-125 mg per tablet, Take 1 tablet by mouth 2 (two) times daily. for 7 days, Disp: 14 tablet, Rfl: 0    azelastine (ASTELIN) 137 mcg (0.1 %) nasal spray, 1 spray (137 mcg total) by Nasal route 2 (two) times daily as needed for Rhinitis., Disp: 30 mL, Rfl: 11    difluprednate (DUREZOL) 0.05 % Drop ophthalmic solution, Place into both eyes., Disp: , Rfl:     EPIPEN 2-CHIDI 0.3 mg/0.3 mL (1:1,000) AtIn, , Disp: , Rfl:     fexofenadine (ALLEGRA) 180 MG tablet, Take 1 tablet (180 mg total) by mouth once daily., Disp: 90 tablet, Rfl: 3    fluticasone propionate (FLONASE) 50 mcg/actuation nasal spray, 2 sprays (100 mcg total) by Each Nostril route 2 (two) times daily., Disp: 31.6 mL, Rfl: 5    hydroCHLOROthiazide (MICROZIDE) 12.5 mg capsule, Take 1 capsule (12.5 mg total) by mouth once daily., Disp: 90 capsule, Rfl: 3    ipratropium-albuteroL (COMBIVENT)  mcg/actuation inhaler, Inhale 1 puff into the lungs every 4 (four) hours as needed for Wheezing. Rescue, Disp: 4 g, Rfl: 11    meloxicam (MOBIC) 15 MG tablet, Take 15 mg by mouth., Disp: , Rfl:     montelukast (SINGULAIR) 10 mg tablet, Take 1 tablet (10 mg total) by mouth once daily., Disp: 90 tablet, Rfl: 3    multivitamin (THERAGRAN) per tablet, Take 1 tablet by mouth once daily., Disp: , Rfl:     omega-3 fatty acids/fish oil (FISH OIL-OMEGA-3 FATTY ACIDS) 300-1,000 mg capsule, Take by mouth once daily., Disp: , Rfl:     lifitegrast (XIIDRA) 5 % Dpet, Apply 1 drop to eye every 12 (twelve) hours. (Patient not taking: Reported on 4/6/2023), Disp: 180 each, Rfl: 4    PFIZER COVID-19 MAKI VACCN,PF, 30 mcg/0.3 mL injection, , Disp: , Rfl:     Lab Results   Component Value Date    HGBA1C 5.4 04/06/2023    HGBA1C 5.4 12/10/2020    HGBA1C 5.4 02/16/2015     No results found for: "MICALBCREAT"  Lab Results   Component Value Date    LDLCALC 101.6 04/06/2023    LDLCALC 106.2 " 02/25/2022    CHOL 198 04/06/2023    HDL 85 (H) 04/06/2023    TRIG 57 04/06/2023       Lab Results   Component Value Date     04/06/2023    K 4.1 04/06/2023     04/06/2023    CO2 23 04/06/2023    GLU 78 04/06/2023    BUN 10 04/06/2023    CREATININE 0.8 04/06/2023    CALCIUM 9.9 04/06/2023    PROT 7.8 04/06/2023    ALBUMIN 3.7 04/06/2023    BILITOT 0.5 04/06/2023    ALKPHOS 112 04/06/2023    AST 23 04/06/2023    ALT 24 04/06/2023    ANIONGAP 11 04/06/2023    ESTGFRAFRICA >60.0 02/25/2022    EGFRNONAA >60.0 02/25/2022    WBC 5.50 04/06/2023    HGB 11.7 (L) 04/06/2023    HGB 11.8 (L) 03/24/2022    HCT 37.5 04/06/2023    MCV 86 04/06/2023     04/06/2023    TSH 0.775 04/06/2023    HEPCAB Negative 12/10/2020       Lab Results   Component Value Date    FSH 12.00 12/30/2019    KYIAJUMS06AU >155 (H) 12/10/2020    XRGULWPO36AE 26 (L) 06/05/2018    WYDCTJJQ49 >2000 (H) 02/25/2022    FERRITIN 66 02/25/2022    IRON 58 02/25/2022    TRANSFERRIN 297 02/25/2022    TIBC 440 02/25/2022    FESATURATED 13 (L) 02/25/2022         Past Medical History:   Diagnosis Date    Acute iritis     Allergy     Atopic dermatitis     Environmental allergies 03/29/2016    Hypertension     Lower back pain 03/29/2016    Recurrent upper respiratory infection (URI)     Trigeminal neuralgia 04/14/2015    Uveitis of right eye 2003, 2018    Vitamin D deficiency disease 02/16/2015     Past Surgical History:   Procedure Laterality Date    BUNIONECTOMY Left     CHOLECYSTECTOMY      INTRAUTERINE DEVICE INSERTION  2013    MIRENA    LAPAROSCOPIC CHOLECYSTECTOMY N/A 5/13/2021    Procedure: CHOLECYSTECTOMY, LAPAROSCOPIC;  Surgeon: Deborah Agosto MD;  Location: [facility] OR 2ND FLR;  Service: General;  Laterality: N/A;    SPINAL FUSION  04/2013    Lumbar Spinal fusion, laminectomies     Social History     Social History Narrative    , works in [employer].      Family History   Problem Relation Age of Onset    Hypertension Mother      "Thyroid disease Mother         Goiter removed    Heart disease Father 56        congenital defect, aortic replacement    Kidney disease Father         after Cardiac study     Diabetes Mellitus Father     Diabetes Father     Eczema Brother     Breast cancer Maternal Aunt     No Known Problems Maternal Grandmother     No Known Problems Maternal Grandfather     No Known Problems Paternal Grandmother     No Known Problems Paternal Grandfather     Sarcoidosis Cousin 55        lung involvement    Eczema Other     Eczema Daughter     Asthma Neg Hx     Emphysema Neg Hx     Blindness Neg Hx     Glaucoma Neg Hx     Macular degeneration Neg Hx     Retinal detachment Neg Hx     Inflammatory bowel disease Neg Hx     Lupus Neg Hx     Psoriasis Neg Hx     Rheum arthritis Neg Hx     Colon cancer Neg Hx     Ovarian cancer Neg Hx     Heart attacks under age 50 Neg Hx      Vitals:    10/05/23 0909   BP: 118/72   Pulse: 88   Resp: 18   SpO2: 99%   Weight: 99.3 kg (218 lb 14.7 oz)   Height: 5' 4" (1.626 m)   PainSc: 0-No pain     Objective:   Physical Exam  Vitals reviewed.   Constitutional:       Appearance: Normal appearance.   HENT:      Head: Normocephalic.      Right Ear: Tympanic membrane, ear canal and external ear normal.      Left Ear: Tympanic membrane, ear canal and external ear normal.      Nose: Nose normal.      Mouth/Throat:      Mouth: Mucous membranes are moist.      Pharynx: Oropharynx is clear.   Eyes:      Conjunctiva/sclera: Conjunctivae normal.      Pupils: Pupils are equal, round, and reactive to light.   Cardiovascular:      Rate and Rhythm: Normal rate and regular rhythm.      Pulses: Normal pulses.   Pulmonary:      Effort: Pulmonary effort is normal.      Breath sounds: Normal breath sounds.   Abdominal:      General: Abdomen is flat. Bowel sounds are normal.      Palpations: Abdomen is soft.   Musculoskeletal:      Cervical back: Neck supple.   Skin:     General: Skin is warm.   Neurological:      General: No " focal deficit present.      Mental Status: She is alert.   Psychiatric:         Mood and Affect: Mood normal.       Assessment/Plan     Gisella Bennett is a 53 y.o.female with:    Essential hypertension  -     [facility] En[facility] Order  -     Hypertension Digital Medicine (HDM): Assign Onboarding Questionnaires    Laryngopharyngeal reflux (LPR)  - Stable. Continue current management, monitor.    Environmental allergies  - Stable. Continue current management, monitor.    Severe obesity (BMI 35.0-39.9) with comorbidity  - Discussed healthy BMI, goal weight.  Recommend diet/exercise and health lifestyle choices.     Vitamin D deficiency disease  - Stable. Continue current management, monitor.    Uveitis    AMALIA positive  - Stable. Continue current management, monitor.    Sensation of fullness in ear, unspecified laterality  -     Ambulatory referral/consult to ENT; Future; Expected date: 10/12/2023    Mild intermittent asthma without complication  - Stable. Continue current management, monitor.       Chronic conditions status updated as per HPI.  Other than changes above, cont current medications and maintain follow up with specialists.  Return to clinic in Follow up in about 6 months (around 4/7/2024).      Yudi Sales MD  [facility]    Patient Instructions   Labs are fasting. Please do not eat or drink anything other than water for 8-10 hrs prior to your lab work.    6 months for well visit or sooner if needed.     Try afrin for the next 3 days  Tests to Keep You Healthy    Mammogram: Met on 6/5/2023  Colon Cancer Screening: Met on 3/7/2022  Cervical Cancer Screening: DUE  Last Blood Pressure <= 139/89 (10/5/2023): Yes

## 2023-10-05 NOTE — PATIENT INSTRUCTIONS
Labs are fasting. Please do not eat or drink anything other than water for 8-10 hrs prior to your lab work.    6 months for well visit or sooner if needed.     Try afrin for the next 3 days

## 2023-10-13 ENCOUNTER — CLINICAL SUPPORT (OUTPATIENT)
Dept: AUDIOLOGY | Facility: CLINIC | Age: 53
End: 2023-10-13
Payer: COMMERCIAL

## 2023-10-13 ENCOUNTER — OFFICE VISIT (OUTPATIENT)
Dept: OTOLARYNGOLOGY | Facility: CLINIC | Age: 53
End: 2023-10-13
Payer: COMMERCIAL

## 2023-10-13 DIAGNOSIS — Z86.69 HISTORY OF FREQUENT EAR INFECTIONS: ICD-10-CM

## 2023-10-13 DIAGNOSIS — H93.8X9 EAR FULLNESS, UNSPECIFIED LATERALITY: Primary | ICD-10-CM

## 2023-10-13 DIAGNOSIS — H69.92 DYSFUNCTION OF LEFT EUSTACHIAN TUBE: Primary | ICD-10-CM

## 2023-10-13 PROCEDURE — 3044F HG A1C LEVEL LT 7.0%: CPT | Mod: CPTII,S$GLB,, | Performed by: OTOLARYNGOLOGY

## 2023-10-13 PROCEDURE — 92557 PR COMPREHENSIVE HEARING TEST: ICD-10-PCS | Mod: S$GLB,,, | Performed by: AUDIOLOGIST

## 2023-10-13 PROCEDURE — 1159F MED LIST DOCD IN RCRD: CPT | Mod: CPTII,S$GLB,, | Performed by: OTOLARYNGOLOGY

## 2023-10-13 PROCEDURE — 99999 PR PBB SHADOW E&M-EST. PATIENT-LVL I: ICD-10-PCS | Mod: PBBFAC,,, | Performed by: AUDIOLOGIST

## 2023-10-13 PROCEDURE — 99999 PR PBB SHADOW E&M-EST. PATIENT-LVL I: CPT | Mod: PBBFAC,,, | Performed by: AUDIOLOGIST

## 2023-10-13 PROCEDURE — 92567 TYMPANOMETRY: CPT | Mod: S$GLB,,, | Performed by: AUDIOLOGIST

## 2023-10-13 PROCEDURE — 92567 PR TYMPA2METRY: ICD-10-PCS | Mod: S$GLB,,, | Performed by: AUDIOLOGIST

## 2023-10-13 PROCEDURE — 99999 PR PBB SHADOW E&M-EST. PATIENT-LVL III: CPT | Mod: PBBFAC,,, | Performed by: OTOLARYNGOLOGY

## 2023-10-13 PROCEDURE — 1159F PR MEDICATION LIST DOCUMENTED IN MEDICAL RECORD: ICD-10-PCS | Mod: CPTII,S$GLB,, | Performed by: OTOLARYNGOLOGY

## 2023-10-13 PROCEDURE — 92557 COMPREHENSIVE HEARING TEST: CPT | Mod: S$GLB,,, | Performed by: AUDIOLOGIST

## 2023-10-13 PROCEDURE — 99203 OFFICE O/P NEW LOW 30 MIN: CPT | Mod: S$GLB,,, | Performed by: OTOLARYNGOLOGY

## 2023-10-13 PROCEDURE — 3044F PR MOST RECENT HEMOGLOBIN A1C LEVEL <7.0%: ICD-10-PCS | Mod: CPTII,S$GLB,, | Performed by: OTOLARYNGOLOGY

## 2023-10-13 PROCEDURE — 99203 PR OFFICE/OUTPT VISIT, NEW, LEVL III, 30-44 MIN: ICD-10-PCS | Mod: S$GLB,,, | Performed by: OTOLARYNGOLOGY

## 2023-10-13 PROCEDURE — 99999 PR PBB SHADOW E&M-EST. PATIENT-LVL III: ICD-10-PCS | Mod: PBBFAC,,, | Performed by: OTOLARYNGOLOGY

## 2023-10-13 NOTE — PROGRESS NOTES
Subjective     Patient ID: Gisella Bennett is a 53 y.o. female.    Chief Complaint: Ear Fullness    Ear Fullness   Pertinent negatives include no sore throat.       Presents on referral from primary care for recurrent ear infections.  She reports that she is had 2 ear infections in the last year.  And wanted to make sure there was no chronic issue with her ear.  She denies history of ear infections growing up her previous ENT surgeries.  She does have a history of chronic rhinitis and takes medications for this.  She notices some mild ear fullness but no other ear symptoms today.      Review of Systems   Constitutional:  Negative for chills and fever.   HENT:  Negative for sore throat and trouble swallowing.    Respiratory:  Negative for apnea and chest tightness.    Cardiovascular:  Negative for chest pain.          Objective     Physical Exam  Vitals and nursing note reviewed.   Constitutional:       Appearance: Normal appearance.   HENT:      Head: Normocephalic and atraumatic.      Right Ear: Tympanic membrane, ear canal and external ear normal. No drainage, swelling or tenderness. No middle ear effusion. There is no impacted cerumen. Tympanic membrane is not retracted. Tympanic membrane has normal mobility.      Left Ear: Tympanic membrane, ear canal and external ear normal. No drainage, swelling or tenderness.  No middle ear effusion. There is no impacted cerumen. Tympanic membrane is not retracted. Tympanic membrane has normal mobility.      Ears:      García exam findings: Does not lateralize.     Right Rinne: AC > BC.     Left Rinne: AC > BC.  Neurological:      Mental Status: She is alert.            Assessment and Plan     1. Ear fullness, unspecified laterality    2. History of frequent ear infections  -     Ambulatory referral/consult to ENT        Patient reassured there are signs of abnormalities of her ear drums.  No anatomic issues that would lead to recurrent ear infections.  Do not recommend  any medical or surgical intervention at this time from ENT standpoint.         No follow-ups on file.

## 2023-10-13 NOTE — PROGRESS NOTES
Ms. Gisella Bennett was seen in the clinic today for an audiological evaluation.    Audiological testing revealed normal hearing sensitivity for the right ear and normal hearing sensitivity for the left ear.  A speech reception threshold was obtained at 10 dBHL for the right ear and at 10 dBHL for the left ear.  Speech discrimination was 100% for the right ear and 100% for the left ear.      Tympanometry testing revealed a Type A tympanogram for the right ear and a Type B (large ear canal volume) tympanogram for the left ear.      Recommendations:  1. Otologic evaluation  2. Annual audiological evaluation  3. Hearing protection when in noise

## 2023-12-18 NOTE — PROGRESS NOTES
History & Physical  Gynecology      SUBJECTIVE:     Chief Complaint: No chief complaint on file.       History of Present Illness:  Annual. Also reports CADEN. Lastly, she is having some symptoms of a yeast infection. Monistat helped but still feels irritated. Did recently have change in hygiene habits  Menstrual History: starting to become less frequent. Not having hot flushes so much as vaginal dryness  Obstetric Hx: SVDx3  STD/STI Hx: Denies any history of STD's  Contraception Hx: none  Sexually Active: one male partner in the past year  Family history: below  Social: Wears seatbelts. Exercises. Feels safe at home.  Last pap: 2018 normal, HPV neg.  Last mammo: 2023  Colonoscopy: cologard 3/2022  Flu: not yet  Covid shot yes  Vitamins: taking      Review of patient's allergies indicates:   Allergen Reactions    Codeine Other (See Comments)     Tremors  Other reaction(s): Not available    Nuts [tree nut] Anaphylaxis    Avocado (laurus persea) Itching, Nausea And Vomiting and Other (See Comments)       Past Medical History:   Diagnosis Date    Acute iritis     Allergy     Atopic dermatitis     Environmental allergies 2016    Hypertension     Lower back pain 2016    Recurrent upper respiratory infection (URI)     Trigeminal neuralgia 2015    Uveitis of right eye ,     Vitamin D deficiency disease 2015     Past Surgical History:   Procedure Laterality Date    BUNIONECTOMY Left     CHOLECYSTECTOMY      INTRAUTERINE DEVICE INSERTION      MIRENA    LAPAROSCOPIC CHOLECYSTECTOMY N/A 2021    Procedure: CHOLECYSTECTOMY, LAPAROSCOPIC;  Surgeon: Deborah Agosto MD;  Location: [facility] OR [address] [place] [address];  Service: General;  Laterality: N/A;    SPINAL FUSION  2013    Lumbar Spinal fusion, laminectomies     OB History          4    Para   3    Term   3            AB   1    Living   3         SAB        IAB   1    Ectopic        Multiple        Live Births                    Family History   Problem Relation Age of Onset    Hypertension Mother     Thyroid disease Mother         Goiter removed    Heart disease Father 56        congenital defect, aortic replacement    Kidney disease Father         after Cardiac study     Diabetes Mellitus Father     Diabetes Father     Eczema Brother     Breast cancer Maternal Aunt     No Known Problems Maternal Grandmother     No Known Problems Maternal Grandfather     No Known Problems Paternal Grandmother     No Known Problems Paternal Grandfather     Sarcoidosis Cousin 55        lung involvement    Eczema Other     Eczema Daughter     Asthma Neg Hx     Emphysema Neg Hx     Blindness Neg Hx     Glaucoma Neg Hx     Macular degeneration Neg Hx     Retinal detachment Neg Hx     Inflammatory bowel disease Neg Hx     Lupus Neg Hx     Psoriasis Neg Hx     Rheum arthritis Neg Hx     Colon cancer Neg Hx     Ovarian cancer Neg Hx     Heart attacks under age 50 Neg Hx      Social History     Tobacco Use    Smoking status: Never     Passive exposure: Never    Smokeless tobacco: Never    Tobacco comments:     Philanthropy;     Substance Use Topics    Alcohol use: Yes     Comment: monthly    Drug use: No     Comment: CBD cream       Current Outpatient Medications   Medication Sig    amLODIPine (NORVASC) 5 MG tablet Take 1 tablet (5 mg total) by mouth once daily.    azelastine (ASTELIN) 137 mcg (0.1 %) nasal spray 1 spray (137 mcg total) by Nasal route 2 (two) times daily as needed for Rhinitis.    difluprednate (DUREZOL) 0.05 % Drop ophthalmic solution Place into both eyes.    EPIPEN 2-CHIDI 0.3 mg/0.3 mL (1:1,000) AtIn     fexofenadine (ALLEGRA) 180 MG tablet Take 1 tablet (180 mg total) by mouth once daily.    fluticasone propionate (FLONASE) 50 mcg/actuation nasal spray 2 sprays (100 mcg total) by Each Nostril route 2 (two) times daily.    hydroCHLOROthiazide (MICROZIDE) 12.5 mg capsule Take 1 capsule (12.5 mg total) by mouth once daily.     ipratropium-albuteroL (COMBIVENT)  mcg/actuation inhaler Inhale 1 puff into the lungs every 4 (four) hours as needed for Wheezing. Rescue    lifitegrast (XIIDRA) 5 % Dpet Apply 1 drop to eye every 12 (twelve) hours. (Patient not taking: Reported on 10/13/2023)    meloxicam (MOBIC) 15 MG tablet Take 15 mg by mouth.    montelukast (SINGULAIR) 10 mg tablet Take 1 tablet (10 mg total) by mouth once daily.    multivitamin (THERAGRAN) per tablet Take 1 tablet by mouth once daily.    omega-3 fatty acids/fish oil (FISH OIL-OMEGA-3 FATTY ACIDS) 300-1,000 mg capsule Take by mouth once daily.    PFIZER COVID-19 MAKI VACCN,PF, 30 mcg/0.3 mL injection      No current facility-administered medications for this visit.         Review of Systems:  Review of Systems   Constitutional:  Negative for activity change, appetite change, fever and unexpected weight change.   Respiratory:  Negative for shortness of breath.    Cardiovascular:  Negative for chest pain.   Gastrointestinal:  Negative for abdominal pain, blood in stool, constipation, diarrhea, nausea and vomiting.   Endocrine: Negative for hot flashes.   Genitourinary:  Negative for dyspareunia, dysuria, frequency, hematuria, hot flashes, pelvic pain, urgency, vaginal bleeding, vaginal discharge, vaginal pain, urinary incontinence, postcoital bleeding, postmenopausal bleeding and vaginal dryness.   Integumentary:  Negative for breast mass.   Psychiatric/Behavioral:  Negative for sleep disturbance.    Breast: Negative for lump and mass     OBJECTIVE:     Physical Exam:  Physical Exam  Vitals reviewed.   Constitutional:       General: She is not in acute distress.     Appearance: She is well-developed. She is not diaphoretic.   HENT:      Head: Normocephalic and atraumatic.   Eyes:      General: No scleral icterus.        Right eye: No discharge.         Left eye: No discharge.      Conjunctiva/sclera: Conjunctivae normal.   Neck:      Thyroid: No thyromegaly.    Cardiovascular:      Rate and Rhythm: Normal rate.   Pulmonary:      Effort: Pulmonary effort is normal.   Chest:   Breasts:     Breasts are symmetrical.      Right: No inverted nipple, mass, nipple discharge, skin change or tenderness.      Left: No inverted nipple, mass, nipple discharge, skin change or tenderness.   Abdominal:      General: There is no distension.      Palpations: Abdomen is soft.      Tenderness: There is no abdominal tenderness.   Genitourinary:     Labia:         Right: No rash, tenderness, lesion or injury.         Left: No rash, tenderness, lesion or injury.       Vagina: Normal. No signs of injury and foreign body. No vaginal discharge, erythema, tenderness or bleeding.      Cervix: No cervical motion tenderness, discharge or friability.      Uterus: Not deviated, not enlarged, not fixed and not tender.       Adnexa:         Right: No mass, tenderness or fullness.          Left: No mass, tenderness or fullness.     Musculoskeletal:         General: Normal range of motion.      Cervical back: Normal range of motion and neck supple.   Lymphadenopathy:      Cervical: No cervical adenopathy.   Skin:     General: Skin is warm and dry.      Findings: No erythema or rash.   Neurological:      Mental Status: She is alert and oriented to person, place, and time.       ASSESSMENT:       ICD-10-CM ICD-9-CM    1. Well woman exam with routine gynecological exam  Z01.419 V72.31              Plan:      WWE  - Postmenopausal. No issues.  - Vaccines utd  - Labs utd  - Mammogram, Colonoscopy utd  - Pap and co test collected  - Vitamin D and Calcium recs given on AVS  - PFPT referral for CADEN  - Estrace topical for atrophy  - diflucan for suspected yeast infection  - CBE normal. Physical exam normal. VSS     Counseling time: 30 minutes    Selin Mariano

## 2023-12-19 ENCOUNTER — OFFICE VISIT (OUTPATIENT)
Dept: OBSTETRICS AND GYNECOLOGY | Facility: CLINIC | Age: 53
End: 2023-12-19
Payer: COMMERCIAL

## 2023-12-19 VITALS
BODY MASS INDEX: 37.54 KG/M2 | WEIGHT: 218.69 LBS | DIASTOLIC BLOOD PRESSURE: 95 MMHG | SYSTOLIC BLOOD PRESSURE: 120 MMHG

## 2023-12-19 DIAGNOSIS — Z01.419 WELL WOMAN EXAM WITH ROUTINE GYNECOLOGICAL EXAM: Primary | ICD-10-CM

## 2023-12-19 DIAGNOSIS — N95.2 VAGINAL ATROPHY: ICD-10-CM

## 2023-12-19 DIAGNOSIS — N39.3 SUI (STRESS URINARY INCONTINENCE, FEMALE): ICD-10-CM

## 2023-12-19 PROCEDURE — 99396 PR PREVENTIVE VISIT,EST,40-64: ICD-10-PCS | Mod: S$GLB,,, | Performed by: STUDENT IN AN ORGANIZED HEALTH CARE EDUCATION/TRAINING PROGRAM

## 2023-12-19 PROCEDURE — 99999 PR PBB SHADOW E&M-EST. PATIENT-LVL IV: CPT | Mod: PBBFAC,,, | Performed by: STUDENT IN AN ORGANIZED HEALTH CARE EDUCATION/TRAINING PROGRAM

## 2023-12-19 PROCEDURE — 99396 PREV VISIT EST AGE 40-64: CPT | Mod: S$GLB,,, | Performed by: STUDENT IN AN ORGANIZED HEALTH CARE EDUCATION/TRAINING PROGRAM

## 2023-12-19 PROCEDURE — 88141 CYTOPATH C/V INTERPRET: CPT | Mod: ,,, | Performed by: PATHOLOGY

## 2023-12-19 PROCEDURE — 99999 PR PBB SHADOW E&M-EST. PATIENT-LVL IV: ICD-10-PCS | Mod: PBBFAC,,, | Performed by: STUDENT IN AN ORGANIZED HEALTH CARE EDUCATION/TRAINING PROGRAM

## 2023-12-19 PROCEDURE — 88141 PR  CYTOPATH CERV/VAG INTERPRET: ICD-10-PCS | Mod: ,,, | Performed by: PATHOLOGY

## 2023-12-19 PROCEDURE — 3074F SYST BP LT 130 MM HG: CPT | Mod: CPTII,S$GLB,, | Performed by: STUDENT IN AN ORGANIZED HEALTH CARE EDUCATION/TRAINING PROGRAM

## 2023-12-19 PROCEDURE — 3080F DIAST BP >= 90 MM HG: CPT | Mod: CPTII,S$GLB,, | Performed by: STUDENT IN AN ORGANIZED HEALTH CARE EDUCATION/TRAINING PROGRAM

## 2023-12-19 PROCEDURE — 88175 CYTOPATH C/V AUTO FLUID REDO: CPT | Performed by: PATHOLOGY

## 2023-12-19 PROCEDURE — 1159F MED LIST DOCD IN RCRD: CPT | Mod: CPTII,S$GLB,, | Performed by: STUDENT IN AN ORGANIZED HEALTH CARE EDUCATION/TRAINING PROGRAM

## 2023-12-19 PROCEDURE — 3008F PR BODY MASS INDEX (BMI) DOCUMENTED: ICD-10-PCS | Mod: CPTII,S$GLB,, | Performed by: STUDENT IN AN ORGANIZED HEALTH CARE EDUCATION/TRAINING PROGRAM

## 2023-12-19 PROCEDURE — 3044F HG A1C LEVEL LT 7.0%: CPT | Mod: CPTII,S$GLB,, | Performed by: STUDENT IN AN ORGANIZED HEALTH CARE EDUCATION/TRAINING PROGRAM

## 2023-12-19 PROCEDURE — 87624 HPV HI-RISK TYP POOLED RSLT: CPT | Performed by: STUDENT IN AN ORGANIZED HEALTH CARE EDUCATION/TRAINING PROGRAM

## 2023-12-19 PROCEDURE — 3080F PR MOST RECENT DIASTOLIC BLOOD PRESSURE >= 90 MM HG: ICD-10-PCS | Mod: CPTII,S$GLB,, | Performed by: STUDENT IN AN ORGANIZED HEALTH CARE EDUCATION/TRAINING PROGRAM

## 2023-12-19 PROCEDURE — 3044F PR MOST RECENT HEMOGLOBIN A1C LEVEL <7.0%: ICD-10-PCS | Mod: CPTII,S$GLB,, | Performed by: STUDENT IN AN ORGANIZED HEALTH CARE EDUCATION/TRAINING PROGRAM

## 2023-12-19 PROCEDURE — 3074F PR MOST RECENT SYSTOLIC BLOOD PRESSURE < 130 MM HG: ICD-10-PCS | Mod: CPTII,S$GLB,, | Performed by: STUDENT IN AN ORGANIZED HEALTH CARE EDUCATION/TRAINING PROGRAM

## 2023-12-19 PROCEDURE — 1159F PR MEDICATION LIST DOCUMENTED IN MEDICAL RECORD: ICD-10-PCS | Mod: CPTII,S$GLB,, | Performed by: STUDENT IN AN ORGANIZED HEALTH CARE EDUCATION/TRAINING PROGRAM

## 2023-12-19 PROCEDURE — 3008F BODY MASS INDEX DOCD: CPT | Mod: CPTII,S$GLB,, | Performed by: STUDENT IN AN ORGANIZED HEALTH CARE EDUCATION/TRAINING PROGRAM

## 2023-12-19 RX ORDER — FLUCONAZOLE 150 MG/1
150 TABLET ORAL ONCE
Qty: 1 TABLET | Refills: 1 | Status: SHIPPED | OUTPATIENT
Start: 2023-12-19 | End: 2023-12-19

## 2023-12-19 RX ORDER — ESTRADIOL 0.1 MG/G
CREAM VAGINAL
Qty: 42.5 G | Refills: 1 | Status: SHIPPED | OUTPATIENT
Start: 2023-12-19

## 2023-12-22 LAB
HPV HR 12 DNA SPEC QL NAA+PROBE: NEGATIVE
HPV16 AG SPEC QL: NEGATIVE
HPV18 DNA SPEC QL NAA+PROBE: NEGATIVE

## 2023-12-30 ENCOUNTER — PATIENT MESSAGE (OUTPATIENT)
Dept: ADMINISTRATIVE | Facility: OTHER | Age: 53
End: 2023-12-30
Payer: COMMERCIAL

## 2024-01-05 LAB
FINAL PATHOLOGIC DIAGNOSIS: NORMAL
Lab: NORMAL

## 2024-01-09 ENCOUNTER — OFFICE VISIT (OUTPATIENT)
Dept: INTERNAL MEDICINE | Facility: CLINIC | Age: 54
End: 2024-01-09
Payer: COMMERCIAL

## 2024-01-09 ENCOUNTER — CLINICAL SUPPORT (OUTPATIENT)
Dept: REHABILITATION | Facility: HOSPITAL | Age: 54
End: 2024-01-09
Attending: STUDENT IN AN ORGANIZED HEALTH CARE EDUCATION/TRAINING PROGRAM
Payer: COMMERCIAL

## 2024-01-09 ENCOUNTER — LAB VISIT (OUTPATIENT)
Dept: LAB | Facility: OTHER | Age: 54
End: 2024-01-09
Attending: INTERNAL MEDICINE
Payer: COMMERCIAL

## 2024-01-09 VITALS
HEIGHT: 64 IN | DIASTOLIC BLOOD PRESSURE: 84 MMHG | WEIGHT: 220.13 LBS | BODY MASS INDEX: 37.58 KG/M2 | SYSTOLIC BLOOD PRESSURE: 120 MMHG

## 2024-01-09 DIAGNOSIS — R10.11 RUQ PAIN: Primary | ICD-10-CM

## 2024-01-09 DIAGNOSIS — I10 ESSENTIAL HYPERTENSION: ICD-10-CM

## 2024-01-09 DIAGNOSIS — R10.11 RUQ PAIN: ICD-10-CM

## 2024-01-09 DIAGNOSIS — N39.3 SUI (STRESS URINARY INCONTINENCE, FEMALE): ICD-10-CM

## 2024-01-09 DIAGNOSIS — E66.01 SEVERE OBESITY (BMI 35.0-39.9) WITH COMORBIDITY: ICD-10-CM

## 2024-01-09 DIAGNOSIS — R19.8 ABDOMINAL WEAKNESS: ICD-10-CM

## 2024-01-09 DIAGNOSIS — M62.89 PELVIC FLOOR DYSFUNCTION: ICD-10-CM

## 2024-01-09 DIAGNOSIS — R27.9 LACK OF COORDINATION: ICD-10-CM

## 2024-01-09 LAB
ALBUMIN SERPL BCP-MCNC: 3.9 G/DL (ref 3.5–5.2)
ALP SERPL-CCNC: 103 U/L (ref 55–135)
ALT SERPL W/O P-5'-P-CCNC: 29 U/L (ref 10–44)
AMYLASE SERPL-CCNC: 37 U/L (ref 20–110)
AST SERPL-CCNC: 28 U/L (ref 10–40)
BILIRUB DIRECT SERPL-MCNC: 0.2 MG/DL (ref 0.1–0.3)
BILIRUB SERPL-MCNC: 0.4 MG/DL (ref 0.1–1)
LIPASE SERPL-CCNC: 36 U/L (ref 4–60)
PROT SERPL-MCNC: 7.9 G/DL (ref 6–8.4)

## 2024-01-09 PROCEDURE — 3008F BODY MASS INDEX DOCD: CPT | Mod: CPTII,S$GLB,, | Performed by: INTERNAL MEDICINE

## 2024-01-09 PROCEDURE — 1159F MED LIST DOCD IN RCRD: CPT | Mod: CPTII,S$GLB,, | Performed by: INTERNAL MEDICINE

## 2024-01-09 PROCEDURE — 82150 ASSAY OF AMYLASE: CPT | Performed by: INTERNAL MEDICINE

## 2024-01-09 PROCEDURE — 99214 OFFICE O/P EST MOD 30 MIN: CPT | Mod: S$GLB,,, | Performed by: INTERNAL MEDICINE

## 2024-01-09 PROCEDURE — 80076 HEPATIC FUNCTION PANEL: CPT | Performed by: INTERNAL MEDICINE

## 2024-01-09 PROCEDURE — 83690 ASSAY OF LIPASE: CPT | Performed by: INTERNAL MEDICINE

## 2024-01-09 PROCEDURE — 99999 PR PBB SHADOW E&M-EST. PATIENT-LVL III: CPT | Mod: PBBFAC,,, | Performed by: INTERNAL MEDICINE

## 2024-01-09 PROCEDURE — 1160F RVW MEDS BY RX/DR IN RCRD: CPT | Mod: CPTII,S$GLB,, | Performed by: INTERNAL MEDICINE

## 2024-01-09 PROCEDURE — 3074F SYST BP LT 130 MM HG: CPT | Mod: CPTII,S$GLB,, | Performed by: INTERNAL MEDICINE

## 2024-01-09 PROCEDURE — 3079F DIAST BP 80-89 MM HG: CPT | Mod: CPTII,S$GLB,, | Performed by: INTERNAL MEDICINE

## 2024-01-09 PROCEDURE — 36415 COLL VENOUS BLD VENIPUNCTURE: CPT | Performed by: INTERNAL MEDICINE

## 2024-01-09 PROCEDURE — 97161 PT EVAL LOW COMPLEX 20 MIN: CPT | Mod: PO

## 2024-01-09 NOTE — PROGRESS NOTES
Subjective:      Patient ID: Gisella Bennett is a 53 y.o. female.    Chief Complaint: No chief complaint on file.    RUQ pain: Presents today for RUQ pain. Started about 2-3 weeks ago over the holidays. Report intermittent non radiating pain. Rates pain around 4-5. She did undergo cholecystectomy in 2021. Denies n/v.     Did have mild BP elevation over the holiday but resolved at this time. Office BP is 120/84 . No CP/SOB/lightheadedness/dizziness/palpitations.      Denies any chest pain, shortness of breath, nausea vomiting constipation diarrhea, blood in stool, heartburn    Review of Systems   Constitutional:  Negative for chills, fever and weight loss.   HENT:  Negative for congestion, ear pain and sore throat.    Eyes:  Negative for double vision.   Respiratory:  Negative for cough and shortness of breath.    Cardiovascular:  Negative for chest pain, palpitations and leg swelling.   Gastrointestinal:  Positive for abdominal pain and constipation. Negative for diarrhea, heartburn, melena, nausea and vomiting.   Genitourinary:  Negative for dysuria, frequency and hematuria.   Musculoskeletal:  Negative for myalgias.   Skin:  Negative for rash.   Neurological:  Positive for headaches. Negative for dizziness and tingling.   Psychiatric/Behavioral:  Negative for depression.          Current Outpatient Medications:     amLODIPine (NORVASC) 5 MG tablet, Take 1 tablet (5 mg total) by mouth once daily., Disp: 90 tablet, Rfl: 3    azelastine (ASTELIN) 137 mcg (0.1 %) nasal spray, 1 spray (137 mcg total) by Nasal route 2 (two) times daily as needed for Rhinitis., Disp: 30 mL, Rfl: 11    EPIPEN 2-CHIDI 0.3 mg/0.3 mL (1:1,000) AtIn, , Disp: , Rfl:     estradioL (ESTRACE) 0.01 % (0.1 mg/gram) vaginal cream, Apply pea-sized amount nightly for two weeks then twice a week there after, Disp: 42.5 g, Rfl: 1    fexofenadine (ALLEGRA) 180 MG tablet, Take 1 tablet (180 mg total) by mouth once daily., Disp: 90 tablet, Rfl: 3     "fluticasone propionate (FLONASE) 50 mcg/actuation nasal spray, 2 sprays (100 mcg total) by Each Nostril route 2 (two) times daily., Disp: 31.6 mL, Rfl: 5    hydroCHLOROthiazide (MICROZIDE) 12.5 mg capsule, Take 1 capsule (12.5 mg total) by mouth once daily., Disp: 90 capsule, Rfl: 3    ipratropium-albuteroL (COMBIVENT)  mcg/actuation inhaler, Inhale 1 puff into the lungs every 4 (four) hours as needed for Wheezing. Rescue, Disp: 4 g, Rfl: 11    montelukast (SINGULAIR) 10 mg tablet, Take 1 tablet (10 mg total) by mouth once daily., Disp: 90 tablet, Rfl: 3    multivitamin (THERAGRAN) per tablet, Take 1 tablet by mouth once daily., Disp: , Rfl:     omega-3 fatty acids/fish oil (FISH OIL-OMEGA-3 FATTY ACIDS) 300-1,000 mg capsule, Take by mouth once daily., Disp: , Rfl:     difluprednate (DUREZOL) 0.05 % Drop ophthalmic solution, Place into both eyes., Disp: , Rfl:     lifitegrast (XIIDRA) 5 % Dpet, Apply 1 drop to eye every 12 (twelve) hours. (Patient not taking: Reported on 1/9/2024), Disp: 180 each, Rfl: 4    meloxicam (MOBIC) 15 MG tablet, Take 15 mg by mouth., Disp: , Rfl:     PFIZER COVID-19 MAKI VACCN,PF, 30 mcg/0.3 mL injection, , Disp: , Rfl:     Lab Results   Component Value Date    HGBA1C 5.4 04/06/2023    HGBA1C 5.4 12/10/2020    HGBA1C 5.4 02/16/2015     No results found for: "MICALBCREAT"  Lab Results   Component Value Date    LDLCALC 101.6 04/06/2023    LDLCALC 106.2 02/25/2022    CHOL 198 04/06/2023    HDL 85 (H) 04/06/2023    TRIG 57 04/06/2023       Lab Results   Component Value Date     04/06/2023    K 4.1 04/06/2023     04/06/2023    CO2 23 04/06/2023    GLU 78 04/06/2023    BUN 10 04/06/2023    CREATININE 0.8 04/06/2023    CALCIUM 9.9 04/06/2023    PROT 7.9 01/09/2024    ALBUMIN 3.9 01/09/2024    BILITOT 0.4 01/09/2024    ALKPHOS 103 01/09/2024    AST 28 01/09/2024    ALT 29 01/09/2024    ANIONGAP 11 04/06/2023    ESTGFRAFRICA >60.0 02/25/2022    EGFRNONAA >60.0 02/25/2022    WBC " 5.50 04/06/2023    HGB 11.7 (L) 04/06/2023    HGB 11.8 (L) 03/24/2022    HCT 37.5 04/06/2023    MCV 86 04/06/2023     04/06/2023    TSH 0.775 04/06/2023    HEPCAB Negative 12/10/2020       Lab Results   Component Value Date    FSH 12.00 12/30/2019    EITVXIHG98NZ >155 (H) 12/10/2020    KOOKICCX18OZ 26 (L) 06/05/2018    TCKCYGCH55 >2000 (H) 02/25/2022    FERRITIN 66 02/25/2022    IRON 58 02/25/2022    TRANSFERRIN 297 02/25/2022    TIBC 440 02/25/2022    FESATURATED 13 (L) 02/25/2022         Past Medical History:   Diagnosis Date    Acute iritis     Allergy     Atopic dermatitis     Environmental allergies 03/29/2016    Hypertension     Lower back pain 03/29/2016    Recurrent upper respiratory infection (URI)     Trigeminal neuralgia 04/14/2015    Uveitis of right eye 2003, 2018    Vitamin D deficiency disease 02/16/2015     Past Surgical History:   Procedure Laterality Date    BUNIONECTOMY Left     CHOLECYSTECTOMY      INTRAUTERINE DEVICE INSERTION  2013    MIRENA    LAPAROSCOPIC CHOLECYSTECTOMY N/A 5/13/2021    Procedure: CHOLECYSTECTOMY, LAPAROSCOPIC;  Surgeon: Deborah Agosto MD;  Location: [facility] OR [address] [place] [address];  Service: General;  Laterality: N/A;    SPINAL FUSION  04/2013    Lumbar Spinal fusion, laminectomies     Social History     Social History Narrative    , works in [employer].      Family History   Problem Relation Age of Onset    Hypertension Mother     Thyroid disease Mother         Goiter removed    Heart disease Father 56        congenital defect, aortic replacement    Kidney disease Father         after Cardiac study     Diabetes Mellitus Father     Diabetes Father     Eczema Brother     Breast cancer Maternal Aunt     No Known Problems Maternal Grandmother     No Known Problems Maternal Grandfather     No Known Problems Paternal Grandmother     No Known Problems Paternal Grandfather     Sarcoidosis Cousin 55        lung involvement    Eczema Other     Eczema Daughter     Asthma  "Neg Hx     Emphysema Neg Hx     Blindness Neg Hx     Glaucoma Neg Hx     Macular degeneration Neg Hx     Retinal detachment Neg Hx     Inflammatory bowel disease Neg Hx     Lupus Neg Hx     Psoriasis Neg Hx     Rheum arthritis Neg Hx     Colon cancer Neg Hx     Ovarian cancer Neg Hx     Heart attacks under age 50 Neg Hx      Vitals:    01/09/24 1118   BP: 120/84   Weight: 99.9 kg (220 lb 2.1 oz)   Height: 5' 4" (1.626 m)   PainSc:   4   PainLoc: Abdomen     Objective:   Physical Exam  Constitutional:       Appearance: Normal appearance.   HENT:      Head: Normocephalic.      Nose: Nose normal.   Abdominal:      General: Abdomen is flat. Bowel sounds are normal. There is no distension.      Palpations: There is no mass.      Tenderness: There is no abdominal tenderness. There is no right CVA tenderness, left CVA tenderness, guarding or rebound.      Hernia: No hernia is present.   Neurological:      Mental Status: She is alert and oriented to person, place, and time. Mental status is at baseline.   Psychiatric:         Mood and Affect: Mood normal.         Behavior: Behavior normal.       Assessment:     1. RUQ pain    2. Severe obesity (BMI 35.0-39.9) with comorbidity    3. Essential hypertension      Plan:     Orders Placed This Encounter    Hepatic Function Panel    LIPASE    AMYLASE   - Keep BP log at home, alert MD with changes    There are no Patient Instructions on file for this visit.  All of your core healthy metrics are met.                            Answers submitted by the patient for this visit:  Abdominal Pain Questionnaire (Submitted on 1/9/2024)  Chief Complaint: Abdominal pain  Chronicity: recurrent  Onset: 1 to 4 weeks ago  Onset quality: gradual  Frequency: daily  Episode duration: 2 Hours  Progression since onset: waxing and waning  Pain location: RUQ  Pain - numeric: 5/10  Pain quality: dull  anorexia: No  arthralgias: No  belching: No  flatus: No  hematochezia: No  Aggravated by: " nothing  Relieved by: liquids  Pain severity: moderate  Treatments tried: nothing  abdominal surgery: Yes  colon cancer: No  Crohn's disease: No  gallstones: Yes  GERD: No  irritable bowel syndrome: No  kidney stones: No  pancreatitis: No  PUD: No  ulcerative colitis: No  UTI: Yes

## 2024-01-09 NOTE — PLAN OF CARE
[facility]  Pelvic Health Physical Therapy Initial Evaluation    Date: 1/9/2024   Name: Gisella Bennett  Clinic Number: [id number]  Therapy Diagnosis:   Encounter Diagnoses   Name Primary?    CADEN (stress urinary incontinence, female)     Pelvic floor dysfunction     Lack of coordination     Abdominal weakness      Physician: Selin Mariano MD    Physician Orders: Pelvic PT Eval and Treat  Medical Diagnosis from Referral: CADEN (stress urinary incontinence, female) [N39.3]   Evaluation Date: 1/9/2024  Authorization Period Expiration: 12/31/2024  Plan of Care Expiration: 04/30/2024  Visit # / Visits authorized: 1/1    Time In: 9:11 am (pt with late arrival)  Time Out: 9:43 am  Total Appointment Time (timed & untimed codes): 32 minutes    Precautions: universal    Subjective     History of current condition - Gisella reports: having bladder leaks since having 2nd child in 1996. Has 3 children, all 3 were vaginal births. Started after birth of 2nd child and has not stopped, has gotten worse. Leaks with cough/sneeze, and now also with bending to  something or getting into/out of car, jumping. Also reports some abdominal separation after 3rd child that was never addressed      OB/GYN History:  childbirth vaginal delivery, vaginal dryness, menopause, painful vaginal penetration, and pelvic pain  Sexually active? Yes  Pain with vaginal exams, intercourse or tampon use? Yes - just since menopause, mostly with insertion and sometimes deeper penetration; thinks it might be due to lack of lubrication   Prolapse symptoms? No  Bladder/Bowel History:   Frequency of urination:   Daytime: 6x           Nighttime: 1-2 x (has been going on about a year)  Difficulty initiating urine stream: No  Urine stream: strong  Complete emptying: Yes  Bladder leakage: Yes  Frequency of incidents: a few times per week  Amount leaked (urine): few drops, teaspoon(s), small squirt , large squirt, and moderate  amount  Urinary Urgency: Yes  Able to delay the urge for at least 10 minute(s). Trigger: running water, key in the door sometimes  Frequency of bowel movements: once a day but incomplete, was going about 3 x per day  Difficulty initiating BM: No  Quality/Shape of BM: [place] Stool Chart 2  Does Patient Feel Empty after BM? No  Fiber Supplements or Laxative Use?  Yes; re-starting Magnesium  Colon leakage: No  Form of protection: panty liner just during allergy season  Number of pads required in 24 hours: 3    Prior Therapy/Previous treatment included: None  Social History: lives with their spouse and son  Current exercise: walking, 3 x per week  Occupation: Pt works as a runs a [employer] and job-related duties include a lot of sitting in meetings.  Prior Level of Function: Independent  Current Level of Function: Independent    Types of fluid intake: water, milk, tea, and smoothie, alcohol (infrequently)  Diet: Unremarkable   Habitus:overweight    PAIN:  Location: right flank - is going to her PCP about this  Current 2/10, worst 5/10, best 0/10   Description: Aching and Dull  Aggravating Factors/Activities that cause symptoms: unknown   Easing Factors: rest    Location: superficial pelvic floor muscles and introitus  Current 0/10, worst 4/10, best 0/10   Description: tender and sore  Aggravating Factors/Activities that cause symptoms: Vaginal exam/provocation   Easing Factors: cessation of provoking activity      Medical History: Gisella  has a past medical history of Acute iritis, Allergy, Atopic dermatitis, Environmental allergies (03/29/2016), Hypertension, Lower back pain (03/29/2016), Recurrent upper respiratory infection (URI), Trigeminal neuralgia (04/14/2015), Uveitis of right eye (2003, 2018), and Vitamin D deficiency disease (02/16/2015).     Surgical History:  Gisella Bennett  has a past surgical history that includes Intrauterine device insertion (2013); Spinal fusion (04/2013); Bunionectomy (Left);  Laparoscopic cholecystectomy (N/A, 5/13/2021); and Cholecystectomy.    Medications: Gisella has a current medication list which includes the following prescription(s): amlodipine, azelastine, difluprednate, epipen 2-thomas, estradiol, fexofenadine, fluticasone propionate, hydrochlorothiazide, ipratropium-albuterol, lifitegrast, meloxicam, montelukast, multivitamin, fish oil-omega-3 fatty acids, and pfizer covid-19 viridiana vaccn(pf).    Allergies:   Review of patient's allergies indicates:   Allergen Reactions    Codeine Other (See Comments)     Tremors  Other reaction(s): Not available    Nuts [tree nut] Anaphylaxis    Avocado (laurus persea) Itching, Nausea And Vomiting and Other (See Comments)        Imaging See EMR for full imaging workup    Pts goals: Would like to stop leakage, wants to protect her pelvic floor muscles for the future and not have to wear briefs, would like to strengthen pelvic floor muscles, also with some diastasis that she would like to address;     OBJECTIVE     See EMR under MEDIA for written consent provided 1/9/2024  Chaperone: declined    ORTHO SCREEN  Posture in sitting: sits squarely  and ankles crossed left over right   Posture in standing: forward head and forward and rounded shoulders   ABDOMINAL WALL ASSESSMENT      VAGINAL PELVIC FLOOR EXAM - Deferred    Limitation/Restriction for FOTO Survey    Therapist reviewed FOTO scores for Gisella Bennett on 1/9/2024.   FOTO documents entered into [facility] - see Media section.    Limitation Score:        TREATMENT - No treatment today       Assessment     Gisella is a 53 y.o. female referred to outpatient Physical Therapy with a medical diagnosis of CADEN (stress urinary incontinence, female) [N39.3] . Pt presents with altered posture, poor knowledge of body mechanics and posture, poor trunk stability, poor quality of pelvic muscle contraction, poor coordination of pelvic floor muscles during ADL's leading to urinary or fecal leakage, poor fluid  intake, and dysfunctional voiding.     Pt prognosis is Fair.   Pt will benefit from skilled outpatient Physical Therapy to address the deficits stated above and in the chart below, provide pt/family education, and to maximize pt's level of independence.     Plan of care discussed with patient: Yes  Pt's spiritual, cultural and educational needs considered and patient is agreeable to the plan of care and goals as stated below:       Anticipated Barriers for therapy: Schedule availability        Medical Necessity is demonstrated by the following    History  Co-morbidities and personal factors that may impact the plan of care Co-morbidities:   consumption of bladder irritants   Acute iritis, Allergy, Atopic dermatitis, Environmental allergies (03/29/2016), Hypertension, Lower back pain (03/29/2016), Recurrent upper respiratory infection (URI), Trigeminal neuralgia (04/14/2015), Uveitis of right eye (2003, 2018), and Vitamin D deficiency disease (02/16/2015). Past surgical history that includes Intrauterine device insertion (2013); Spinal fusion (04/2013); Bunionectomy (Left); Laparoscopic cholecystectomy (N/A, 5/13/2021); and Cholecystectomy.  Personal Factors:   no deficits     low   Examination  Body Structures and Functions, activity limitations and participation restrictions that may impact the plan of care Body Regions/Systems/Functions:  altered posture, poor knowledge of body mechanics and posture, poor trunk stability, poor quality of pelvic muscle contraction, poor coordination of pelvic floor muscles during ADL's leading to urinary or fecal leakage, poor fluid intake, and dysfunctional voiding.     Activity Limitations:  intercourse/vaginal exam/tampon use without pain, incontinence with ADLs, Pain with ADLs, and bathroom mapping    Participation Restrictions:  social activities with friends/family, well woman's exam, relationship with spouse/partner, and exercise restrictions due to incontinence     Activity  "limitations:   Learning and applying knowledge  no deficits    General Tasks and Commands  no deficits    Communication  no deficits    Mobility  no deficits    Self care  no deficits    Domestic Life  no deficits    Interactions/Relationships  no deficits    Life Areas  no deficits    Community and Social Life  no deficits       low   Clinical Presentation stable and uncomplicated low   Decision Making/ Complexity Score: low     Short Term Goals: 8 weeks  Goal Status Notes   Pt to be edu pelvic muscle bracing and be able to consistently perform correctly and quickly to help decrease incontinence with cough/laugh/sneeze.     Pt to perform "the knack" prior to coughing, laughing or sneezing to decrease risk of incontinence.     Pt to demonstrate being able to correctly and consistently perform a kegel which is needed  to increase pelvic floor muscle coordination and strength needed for continence.     Pt to report being able to sneeze without incontinence demonstrating improved pelvic floor strength and coordination to improve confidence in social situations     Pt to demonstrate an improved score in the FOTO Bowel Cnst survey  to at least 69 to demonstrate improving symptoms and self management of condition.     Pt to demonstrate an improved score in the FOTO Urinary Problem survey  to at least 58 to demonstrate improving symptoms and self management of condition.         Long Term Goals: 16 weeks  Goal Status Notes   Pt to be discharged with home plan for carry over after discharge.       Pt to report no longer feeling the need to urinate just in case when shopping or participating in social activities to demonstrate improving pelvic floor and bladder control.     Pt to demonstrate an improved score in the FOTO Bowel Cnst survey  to at least 72 to demonstrate improving symptoms and self management of condition.     Pt to demonstrate an improved score in the FOTO Urinary Problem survey  to at least 65 to demonstrate " improving symptoms and self management of condition.            Plan     Plan of care Certification: 1/9/2024 to 04/30/2024.    Outpatient Physical Therapy 0 - 2 times weekly for 16 weeks to include the following interventions: therapeutic exercises, therapeutic activity, neuromuscular re-education, manual therapy, modalities PRN, patient/family education, dry needling, and self care/home management. PT may adjust visit frequency based upon patient's progression through plan of care.    I appreciate your consult and look forward to participating in this patient's care.    Catherine Brunner, PT, DPT

## 2024-01-09 NOTE — PROGRESS NOTES
Your liver (AST, ALT) function is unremarkable.  Pancreatic labs negative. Would stay well hydrated at this time. If continued pain, we can consider getting US of the area.

## 2024-01-22 NOTE — PROGRESS NOTES
Pelvic Health Physical Therapy   Treatment Note     Name: Gisella Burgess [place]  Clinic Number: [id number]    Therapy Diagnosis:   Encounter Diagnoses   Name Primary?    CADEN (stress urinary incontinence, female) Yes    Pelvic floor dysfunction     Lack of coordination     Abdominal weakness      Physician: Selin Mariano MD    Visit Date: 1/23/2024    Physician Orders: Pelvic PT Eval and Treat  Medical Diagnosis from Referral: CADEN (stress urinary incontinence, female) [N39.3]   Evaluation Date: 1/9/2024  Authorization Period Expiration: 12/31/2024  Plan of Care Expiration: 04/30/2024  Visit # / Visits authorized: 1/20   FOTO: 1/3 (1/9/24)  Cancelled Visits: 0  No Show Visits: 0    Time In: 8:23 am (pt with late arrival)  Time Out: 8:57 am  Total Billable Time: 34 minutes    Precautions: Standard    Subjective     Pt reports: no major changes in her symptoms since evaluation.  She was compliant with home exercise program.  Response to previous treatment: Ongoing  Functional change: Ongoing    Pain: 0/10  Location: pelvic region    Constitutional Symptoms Review: The patient denies having any constitutional symptoms.     Gisella's goals for PT:  Would like to stop leakage, wants to protect her pelvic floor muscles for the future and not have to wear briefs, would like to strengthen pelvic floor muscles, also with some diastasis that she would like to address;       Objective   Pt verbally consents to treatment today.  Signed consent form already on file.     Gisella received therapeutic exercises to develop  strength, endurance, ROM, flexibility, posture, and core stabilization for 08 minutes including: See table below    Gisella received the following manual therapy techniques: to develop flexibility, extensibility, and desensitization for 00 minutes including: See table below    Gisella participated in neuromuscular re-education activities to develop Coordination, Control, Down training, Posture, Proprioception,  Kinesthetic, Balance, and Sense for 11 minutes including: See table below    Gisella participated in dynamic functional therapeutic activities to improve functional performance for 15  minutes, including:  See table below     Intervention 1/23/24   TherAc Assign bowel/bladder diary     Review bowel/bladder diary     Education re: The Knack x    Education re: blow as you go x    Education re: intraabdominal pressure management/bearing down     Education re: HEP x    Education re: therapy progression and proposed plan of care x   Neuro Re-Ed Pelvic Exam - See results below x             TherEx Bridges with ball squeeze 2 x 10             Manual Therapy                      1/23/24: 2/5, 3 sec, 4 reps, 5 in 10 no tenderness to palpation poor coordination; needed verbal cues to find the muscles    Home Exercises Provided and Patient Education Provided     Education provided:   - bladder irritants, anatomy/physiology of pelvic floor, kegels, behavior modifications, and Coordination of kegels with functional activities such as cough, laugh, sneeze, lift, etc.   Discussed progression of plan of care with patient; educated pt in activity modification; reviewed HEP with pt. Pt demonstrated and verbalized understanding of all instruction and was provided with a handout of HEP (see Patient Instructions).    Written Home Exercises Provided: yes.  Exercises were reviewed and Gisella was able to demonstrate them prior to the end of the session.  Gisella demonstrated fair  understanding of the education provided.     See EMR under Patient Instructions for exercises provided 1/23/2024.    Assessment   Gisella with good tolerance to treatment today. Patient with weak pelvic floor muscles but able to activate them. Demonstrated need for verbal cues to activate. She is appropriate for continued skilled pelvic floor PT services at this time.    Gisella Is progressing well towards her goals.   Pt prognosis is Fair.     Pt will continue to  "benefit from skilled outpatient physical therapy to address the deficits listed in the problem list box on initial evaluation, provide pt/family education and to maximize pt's level of independence in the home and community environment.     Pt's spiritual, cultural and educational needs considered and pt agreeable to plan of care and goals.     Anticipated barriers to physical therapy: Schedule Availability     Short Term Goals: 8 weeks  Goal Status Notes   Pt to be edu pelvic muscle bracing and be able to consistently perform correctly and quickly to help decrease incontinence with cough/laugh/sneeze.  In Progress      Pt to perform "the knack" prior to coughing, laughing or sneezing to decrease risk of incontinence.  In Progress      Pt to demonstrate being able to correctly and consistently perform a kegel which is needed  to increase pelvic floor muscle coordination and strength needed for continence.  In Progress      Pt to report being able to sneeze without incontinence demonstrating improved pelvic floor strength and coordination to improve confidence in social situations  In Progress      Pt to demonstrate an improved score in the FOTO Bowel Cnst survey  to at least 69 to demonstrate improving symptoms and self management of condition.  In Progress      Pt to demonstrate an improved score in the FOTO Urinary Problem survey  to at least 58 to demonstrate improving symptoms and self management of condition.    In Progress        Long Term Goals: 16 weeks  Goal Status Notes   Pt to be discharged with home plan for carry over after discharge.    In Progress      Pt to report no longer feeling the need to urinate just in case when shopping or participating in social activities to demonstrate improving pelvic floor and bladder control.  In Progress      Pt to demonstrate an improved score in the FOTO Bowel Cnst survey  to at least 72 to demonstrate improving symptoms and self management of condition.  In Progress "      Pt to demonstrate an improved score in the FOTO Urinary Problem survey  to at least 65 to demonstrate improving symptoms and self management of condition.    In Progress          Plan     Continue per established POC. Next visit, review HEP and add exercises as appropriate.     Catherine Brunner, PT , DPT

## 2024-01-23 ENCOUNTER — CLINICAL SUPPORT (OUTPATIENT)
Dept: REHABILITATION | Facility: HOSPITAL | Age: 54
End: 2024-01-23
Payer: COMMERCIAL

## 2024-01-23 DIAGNOSIS — R19.8 ABDOMINAL WEAKNESS: ICD-10-CM

## 2024-01-23 DIAGNOSIS — M62.89 PELVIC FLOOR DYSFUNCTION: ICD-10-CM

## 2024-01-23 DIAGNOSIS — N39.3 SUI (STRESS URINARY INCONTINENCE, FEMALE): Primary | ICD-10-CM

## 2024-01-23 DIAGNOSIS — R27.9 LACK OF COORDINATION: ICD-10-CM

## 2024-01-23 PROCEDURE — 97112 NEUROMUSCULAR REEDUCATION: CPT | Mod: PO

## 2024-01-23 PROCEDURE — 97110 THERAPEUTIC EXERCISES: CPT | Mod: PO

## 2024-01-23 PROCEDURE — 97530 THERAPEUTIC ACTIVITIES: CPT | Mod: PO

## 2024-01-23 NOTE — PATIENT INSTRUCTIONS
"    How to perform "the Knack"    This can be performed in any position. You may choose to perform in the shower the first few times in case of leakage. Firstly, try to relax your abdomen and pelvic floor. Next, without holding your breath, you will perform a Kegel (pelvic floor contraction) at about 50% strength (NOT a maximal contraction), then gently cough or clear your throat. You may do this up to 10 times, ensuring that you are fully relaxing your muscles between each one. You may perform this 1-2 times per day.    Once you feel comfortable with this exercise, begin incorporating this technique into your daily routine. For example, perform the knack when you feel you are about to sneeze to try to control instances of leakage.      "BLOW AS YOU GO"  - Before you perform any movement (like getting up from a chair, getting in/out of bed, picking something up)...  - Take a breath in to prepare, and then blow out through your mouth GENTLY the entire time you are moving. The exhale should not be forced or rough. It should be slow, like blowing out birthday candles.  - If you run out of air during the movement, continue to breath normally and try not to hold your breath   - Do this to protect your pelvic floor muscles from excessive pressures that comes with holding your breath (called "a Valsalva maneuver").    BEARING DOWN TECHNIQUE  1. Sit on the toilet comfortably with legs and buttocks relaxed.  2. Put your feet on a step stool (8 inches tall).  3. Lean forward while keeping your back straight.  4. Relax and DROP the pelvic floor muscles.  5. Push your belly out and HOLD THIS.  6. Continue to breathe normally without holding your breath. You can "check yourself" to make sure you're not breath holding by saying "ssss" or counting out loud. You can also pretend you are blowing out birthday candles.      Do not try to push any other way. Don't hold your breath.           "

## 2024-01-30 ENCOUNTER — CLINICAL SUPPORT (OUTPATIENT)
Dept: REHABILITATION | Facility: HOSPITAL | Age: 54
End: 2024-01-30
Payer: COMMERCIAL

## 2024-01-30 DIAGNOSIS — M62.89 PELVIC FLOOR DYSFUNCTION: ICD-10-CM

## 2024-01-30 DIAGNOSIS — R19.8 ABDOMINAL WEAKNESS: ICD-10-CM

## 2024-01-30 DIAGNOSIS — R27.9 LACK OF COORDINATION: ICD-10-CM

## 2024-01-30 DIAGNOSIS — N39.3 SUI (STRESS URINARY INCONTINENCE, FEMALE): ICD-10-CM

## 2024-01-30 PROCEDURE — 97530 THERAPEUTIC ACTIVITIES: CPT | Mod: PO

## 2024-01-30 PROCEDURE — 97110 THERAPEUTIC EXERCISES: CPT | Mod: PO

## 2024-01-30 PROCEDURE — 97112 NEUROMUSCULAR REEDUCATION: CPT | Mod: PO

## 2024-01-30 NOTE — PATIENT INSTRUCTIONS
"Kegel Weights:  Insert the level 1 (white) vaginal weight then perform about 10-15 minutes of light activity (I.e. household chores). You should do this one time per day. If you feel pain, have any bleeding, or an infection, stop and alert your therapist.             How to perform "the Knack"    This can be performed in any position. You may choose to perform in the shower the first few times in case of leakage. Firstly, try to relax your abdomen and pelvic floor. Next, without holding your breath, you will perform a Kegel (pelvic floor contraction) at about 50% strength (NOT a maximal contraction), then gently cough or clear your throat. You may do this up to 10 times, ensuring that you are fully relaxing your muscles between each one. You may perform this 1-2 times per day.    Once you feel comfortable with this exercise, begin incorporating this technique into your daily routine. For example, perform the knack when you feel you are about to sneeze to try to control instances of leakage.      "BLOW AS YOU GO"  - Before you perform any movement (like getting up from a chair, getting in/out of bed, picking something up)...  - Take a breath in to prepare, and then blow out through your mouth GENTLY the entire time you are moving. The exhale should not be forced or rough. It should be slow, like blowing out birthday candles.  - If you run out of air during the movement, continue to breath normally and try not to hold your breath   - Do this to protect your pelvic floor muscles from excessive pressures that comes with holding your breath (called "a Valsalva maneuver").    BEARING DOWN TECHNIQUE  1. Sit on the toilet comfortably with legs and buttocks relaxed.  2. Put your feet on a step stool (8 inches tall).  3. Lean forward while keeping your back straight.  4. Relax and DROP the pelvic floor muscles.  5. Push your belly out and HOLD THIS.  6. Continue to breathe normally without holding your breath. You can "check " "yourself" to make sure you're not breath holding by saying "ssss" or counting out loud. You can also pretend you are blowing out birthday candles.      Do not try to push any other way. Don't hold your breath.         "

## 2024-01-30 NOTE — PROGRESS NOTES
Pelvic Health Physical Therapy   Treatment Note     Name: Gisella Burgess [place]  Clinic Number: [id number]    Therapy Diagnosis:   Encounter Diagnoses   Name Primary?    CADEN (stress urinary incontinence, female)     Pelvic floor dysfunction     Lack of coordination     Abdominal weakness      Physician: Selin Mariano MD    Visit Date: 1/30/2024    Physician Orders: Pelvic PT Eval and Treat  Medical Diagnosis from Referral: CADEN (stress urinary incontinence, female) [N39.3]   Evaluation Date: 1/9/2024  Authorization Period Expiration: 12/31/2024  Plan of Care Expiration: 04/30/2024  Visit # / Visits authorized: 1/20   FOTO: 1/3 (1/9/24)  Cancelled Visits: 0  No Show Visits: 0    Time In: 10:30 am  Time Out: 11:10 am  Total Billable Time: 40 minutes    Precautions: Standard    Subjective     Pt reports: tried the exercises. She did the bridges, is working on the [employer]. She has been able to sneeze today without leaking. She has had a few leaks this week, especially when transferring her mother sit to stand, large leak.     She was compliant with home exercise program.  Response to previous treatment: Ongoing  Functional change: Ongoing    Pain: 0/10  Location: pelvic region    Constitutional Symptoms Review: The patient denies having any constitutional symptoms.     Gisella's goals for PT:  Would like to stop leakage, wants to protect her pelvic floor muscles for the future and not have to wear briefs, would like to strengthen pelvic floor muscles, also with some diastasis that she would like to address;       Objective   Pt verbally consents to treatment today.  Signed consent form already on file.     Gisella received therapeutic exercises to develop  strength, endurance, ROM, flexibility, posture, and core stabilization for 12 minutes including: See table below    Gislela received the following manual therapy techniques: to develop flexibility, extensibility, and desensitization for 00 minutes including: See table  below    Gisella participated in neuromuscular re-education activities to develop Coordination, Control, Down training, Posture, Proprioception, Kinesthetic, Balance, and Sense for 15 minutes including: See table below    Gisella participated in dynamic functional therapeutic activities to improve functional performance for 13 minutes, including:  See table below     Intervention 1/30/24 1/23/24   TherAc Assign bowel/bladder diary      Review bowel/bladder diary      Education re: The Knack x x    Education re: blow as you go x x    Education re: intraabdominal pressure management/bearing down x     Education re: HEP x x    Education re: therapy progression and proposed plan of care x x   Neuro Re-Ed Pelvic Exam - See results below  x    kegels weights White - level 1     Bridges with kegel weight inserted 2 x 10-white level 1    TherEx Bridges with ball squeeze  2 x 10    Adductor Squeezes 2 x 10 w 4 sec hold     Clams 2 x 10 bilateral      Reverse Clams 2 x 10 bilateral           Manual Therapy                          1/30/24: Inserted kegel weight and had patient attempt standing with weight inserted. Patient able to stand and lightly march in place with weight inserted.    1/23/24: 2/5, 3 sec, 4 reps, 5 in 10 no tenderness to palpation poor coordination; needed verbal cues to find the muscles    Home Exercises Provided and Patient Education Provided     Education provided:   - bladder irritants, anatomy/physiology of pelvic floor, kegels, behavior modifications, and Coordination of kegels with functional activities such as cough, laugh, sneeze, lift, etc.   Discussed progression of plan of care with patient; educated pt in activity modification; reviewed HEP with pt. Pt demonstrated and verbalized understanding of all instruction and was provided with a handout of HEP (see Patient Instructions).    Written Home Exercises Provided: yes.  Exercises were reviewed and Gisella was able to demonstrate them prior to the  "end of the session.  Gisella demonstrated fair  understanding of the education provided.     See EMR under Patient Instructions for exercises provided 1/23/2024.    Assessment   Gisella with good tolerance to treatment today. Inserted kegel weight and had patient attempt standing with weight inserted. Patient able to stand and lightly march in place with weight inserted. Patient tolerate therapeutic exercises with no reports of pain or other adverse effects. She is appropriate for continued skilled pelvic floor PT services at this time.    Gisella Is progressing well towards her goals.   Pt prognosis is Fair.     Pt will continue to benefit from skilled outpatient physical therapy to address the deficits listed in the problem list box on initial evaluation, provide pt/family education and to maximize pt's level of independence in the home and community environment.     Pt's spiritual, cultural and educational needs considered and pt agreeable to plan of care and goals.     Anticipated barriers to physical therapy: Schedule Availability     Short Term Goals: 8 weeks  Goal Status Notes   Pt to be edu pelvic muscle bracing and be able to consistently perform correctly and quickly to help decrease incontinence with cough/laugh/sneeze. In Progress      Pt to perform "the knack" prior to coughing, laughing or sneezing to decrease risk of incontinence. In Progress      Pt to demonstrate being able to correctly and consistently perform a kegel which is needed  to increase pelvic floor muscle coordination and strength needed for continence. In Progress      Pt to report being able to sneeze without incontinence demonstrating improved pelvic floor strength and coordination to improve confidence in social situations In Progress      Pt to demonstrate an improved score in the FOTO Bowel Cnst survey  to at least 69 to demonstrate improving symptoms and self management of condition. In Progress      Pt to demonstrate an improved " score in the FOTO Urinary Problem survey  to at least 58 to demonstrate improving symptoms and self management of condition.  In Progress        Long Term Goals: 16 weeks  Goal Status Notes   Pt to be discharged with home plan for carry over after discharge.    In Progress      Pt to report no longer feeling the need to urinate just in case when shopping or participating in social activities to demonstrate improving pelvic floor and bladder control.  In Progress      Pt to demonstrate an improved score in the FOTO Bowel Cnst survey  to at least 72 to demonstrate improving symptoms and self management of condition.  In Progress      Pt to demonstrate an improved score in the FOTO Urinary Problem survey  to at least 65 to demonstrate improving symptoms and self management of condition.    In Progress          Plan     Continue per established POC. Next visit, review HEP and add exercises as appropriate.     Catherine Brunner, PT , DPT

## 2024-02-06 ENCOUNTER — CLINICAL SUPPORT (OUTPATIENT)
Dept: REHABILITATION | Facility: HOSPITAL | Age: 54
End: 2024-02-06
Payer: COMMERCIAL

## 2024-02-06 DIAGNOSIS — M62.89 PELVIC FLOOR DYSFUNCTION: ICD-10-CM

## 2024-02-06 DIAGNOSIS — N39.3 SUI (STRESS URINARY INCONTINENCE, FEMALE): Primary | ICD-10-CM

## 2024-02-06 DIAGNOSIS — R19.8 ABDOMINAL WEAKNESS: ICD-10-CM

## 2024-02-06 DIAGNOSIS — R27.9 LACK OF COORDINATION: ICD-10-CM

## 2024-02-06 PROCEDURE — 97110 THERAPEUTIC EXERCISES: CPT | Mod: PO

## 2024-02-06 PROCEDURE — 97112 NEUROMUSCULAR REEDUCATION: CPT | Mod: PO

## 2024-02-06 PROCEDURE — 97530 THERAPEUTIC ACTIVITIES: CPT | Mod: PO

## 2024-02-06 NOTE — PROGRESS NOTES
Pelvic Health Physical Therapy   Treatment & Progress Note     Name: Gisella Busholph  Clinic Number: [id number]    Therapy Diagnosis:   Encounter Diagnoses   Name Primary?    CADEN (stress urinary incontinence, female) Yes    Pelvic floor dysfunction     Lack of coordination     Abdominal weakness        Physician: Selin Mariano MD    Visit Date: 2/6/2024    Physician Orders: Pelvic PT Eval and Treat  Medical Diagnosis from Referral: CADEN (stress urinary incontinence, female) [N39.3]   Evaluation Date: 1/9/2024  Authorization Period Expiration: 12/31/2024  Plan of Care Expiration: 04/30/2024  Visit # / Visits authorized: 3/20   FOTO: 2/3 (1/9/24, 2/6/24)  Cancelled Visits: 0  No Show Visits: 0    Time In: 4:53 pm (pt with late arrival)  Time Out: 5:25 pm  Total Billable Time: 32 minutes    Precautions: Standard    Subjective     Pt reports: she is doing the exercises, doesn't feel like she is having a lot of success with the weights. She sometimes forgets to do it, but also feels like when she does, if she tries to walk or move in standing.    She was compliant with home exercise program.  Response to previous treatment: Ongoing  Functional change: Ongoing    Pain: 0/10  Location: pelvic region    Constitutional Symptoms Review: The patient denies having any constitutional symptoms.     Gisella's goals for PT:  Would like to stop leakage, wants to protect her pelvic floor muscles for the future and not have to wear briefs, would like to strengthen pelvic floor muscles, also with some diastasis that she would like to address;       Objective   Pt verbally consents to treatment today.  Signed consent form already on file.     Gisella received therapeutic exercises to develop  strength, endurance, ROM, flexibility, posture, and core stabilization for 12 minutes including: See table below    Gisella received the following manual therapy techniques: to develop flexibility, extensibility, and desensitization for 00  minutes including: See table below    Gisella participated in neuromuscular re-education activities to develop Coordination, Control, Down training, Posture, Proprioception, Kinesthetic, Balance, and Sense for 08 minutes including: See table below    Gisella participated in dynamic functional therapeutic activities to improve functional performance for 12 minutes, including:  See table below     Intervention 2/6/24 Progress 1/30/24 1/23/24   TherAc Assign bowel/bladder diary       Review bowel/bladder diary       Education re: The Knack  x x    Education re: blow as you go  x x    Education re: kegel weights Modified HEP to supine Pelvic floor muscle contraction, occasionally assess ability to keep inserted in standing      Education re: intraabdominal pressure management/bearing down x x     Education re: HEP x x x    Education re: therapy progression and proposed plan of care x x x   Neuro Re-Ed Pelvic Exam - See results below   x    kegels weights  White - level 1     Pelvic floor muscle contraction - Endurance holds White - level 1 kegel weight inserted, in supine; 4 sec hold, 4 x 5      Pelvic floor muscle contraction - quick flicks White - level 1 kegel weight inserted, in supine; , 4 x 5      Bridges with kegel weight inserted  2 x 10-white level 1    TherEx Bridges with ball squeeze 2 x 10  2 x 10    Adductor Squeezes 2 x 10 w 4 sec hold 2 x 10 w 4 sec hold     Clams  2 x 10 bilateral      Reverse Clams  2 x 10 bilateral      Straight leg raise 2 x 10 bilateral      Manual Therapy                              1/30/24: Inserted kegel weight and had patient attempt standing with weight inserted. Patient able to stand and lightly march in place with weight inserted.    1/23/24: 2/5, 3 sec, 4 reps, 5 in 10 no tenderness to palpation poor coordination; needed verbal cues to find the muscles    Home Exercises Provided and Patient Education Provided     Education provided:   - bladder irritants, anatomy/physiology of  "pelvic floor, kegels, behavior modifications, and Coordination of kegels with functional activities such as cough, laugh, sneeze, lift, etc.   Discussed progression of plan of care with patient; educated pt in activity modification; reviewed HEP with pt. Pt demonstrated and verbalized understanding of all instruction and was provided with a handout of HEP (see Patient Instructions).    Written Home Exercises Provided: yes.  Exercises were reviewed and Gisella was able to demonstrate them prior to the end of the session.  Gisella demonstrated fair  understanding of the education provided.     See EMR under Patient Instructions for exercises provided  2/6/24 .    Assessment   Gisella with good tolerance to treatment today. Modified kegel weight HEP as patient with difficulty keeping it inserted in standing. Able to keep weight inserted in supine with Pelvic floor muscle contraction endurance holds and quick flicks. Tolerated other interventions well with nor reports of pain or other adverse effects. She is appropriate for continued skilled pelvic floor PT services at this time.    Gisella Is progressing well towards her goals.   Pt prognosis is Fair.     Pt will continue to benefit from skilled outpatient physical therapy to address the deficits listed in the problem list box on initial evaluation, provide pt/family education and to maximize pt's level of independence in the home and community environment.     Pt's spiritual, cultural and educational needs considered and pt agreeable to plan of care and goals.     Anticipated barriers to physical therapy: Schedule Availability     Short Term Goals: 8 weeks  Goal Status Notes   Pt to be edu pelvic muscle bracing and be able to consistently perform correctly and quickly to help decrease incontinence with cough/laugh/sneeze. In Progress  Progressing 2/6   Pt to perform "the knack" prior to coughing, laughing or sneezing to decrease risk of incontinence. In Progress   " Progressing 2/6   Pt to demonstrate being able to correctly and consistently perform a kegel which is needed  to increase pelvic floor muscle coordination and strength needed for continence. In Progress   Progressing 2/6   Pt to report being able to sneeze without incontinence demonstrating improved pelvic floor strength and coordination to improve confidence in social situations In Progress   Progressing 2/6   Pt to demonstrate an improved score in the FOTO Bowel Cnst survey  to at least 69 to demonstrate improving symptoms and self management of condition. In Progress   Progressing 2/6   Pt to demonstrate an improved score in the FOTO Urinary Problem survey  to at least 58 to demonstrate improving symptoms and self management of condition.  In Progress   Progressing 2/6     Long Term Goals: 16 weeks  Goal Status Notes   Pt to be discharged with home plan for carry over after discharge.    In Progress   Progressing 2/6   Pt to report no longer feeling the need to urinate just in case when shopping or participating in social activities to demonstrate improving pelvic floor and bladder control.  In Progress   Progressing 2/6   Pt to demonstrate an improved score in the FOTO Bowel Cnst survey  to at least 72 to demonstrate improving symptoms and self management of condition.  In Progress   Progressing 2/6   Pt to demonstrate an improved score in the FOTO Urinary Problem survey  to at least 65 to demonstrate improving symptoms and self management of condition.    In Progress   Progressing 2/6       Plan     Continue per established POC. Next visit, review HEP and add exercises as appropriate.    Catherine Brunner, PT , DPT

## 2024-02-06 NOTE — PATIENT INSTRUCTIONS
"Kegel Weights:  Do your Pelvic floor muscle contractions with the white weight inserted            How to perform "the Knack"    This can be performed in any position. You may choose to perform in the shower the first few times in case of leakage. Firstly, try to relax your abdomen and pelvic floor. Next, without holding your breath, you will perform a Kegel (pelvic floor contraction) at about 50% strength (NOT a maximal contraction), then gently cough or clear your throat. You may do this up to 10 times, ensuring that you are fully relaxing your muscles between each one. You may perform this 1-2 times per day.    Once you feel comfortable with this exercise, begin incorporating this technique into your daily routine. For example, perform the knack when you feel you are about to sneeze to try to control instances of leakage.      "BLOW AS YOU GO"  - Before you perform any movement (like getting up from a chair, getting in/out of bed, picking something up)...  - Take a breath in to prepare, and then blow out through your mouth GENTLY the entire time you are moving. The exhale should not be forced or rough. It should be slow, like blowing out birthday candles.  - If you run out of air during the movement, continue to breath normally and try not to hold your breath   - Do this to protect your pelvic floor muscles from excessive pressures that comes with holding your breath (called "a Valsalva maneuver").    BEARING DOWN TECHNIQUE  1. Sit on the toilet comfortably with legs and buttocks relaxed.  2. Put your feet on a step stool (8 inches tall).  3. Lean forward while keeping your back straight.  4. Relax and DROP the pelvic floor muscles.  5. Push your belly out and HOLD THIS.  6. Continue to breathe normally without holding your breath. You can "check yourself" to make sure you're not breath holding by saying "ssss" or counting out loud. You can also pretend you are blowing out birthday candles.      Do not try to " push any other way. Don't hold your breath.

## 2024-02-08 ENCOUNTER — TELEPHONE (OUTPATIENT)
Dept: PODIATRY | Facility: CLINIC | Age: 54
End: 2024-02-08
Payer: COMMERCIAL

## 2024-02-09 ENCOUNTER — OFFICE VISIT (OUTPATIENT)
Dept: PODIATRY | Facility: CLINIC | Age: 54
End: 2024-02-09
Payer: COMMERCIAL

## 2024-02-09 VITALS — WEIGHT: 220.25 LBS | BODY MASS INDEX: 37.6 KG/M2 | HEIGHT: 64 IN

## 2024-02-09 DIAGNOSIS — G60.9 IDIOPATHIC PERIPHERAL NEUROPATHY: Primary | ICD-10-CM

## 2024-02-09 PROCEDURE — 1160F RVW MEDS BY RX/DR IN RCRD: CPT | Mod: CPTII,S$GLB,, | Performed by: PODIATRIST

## 2024-02-09 PROCEDURE — 1159F MED LIST DOCD IN RCRD: CPT | Mod: CPTII,S$GLB,, | Performed by: PODIATRIST

## 2024-02-09 PROCEDURE — 99999 PR PBB SHADOW E&M-EST. PATIENT-LVL III: CPT | Mod: PBBFAC,,, | Performed by: PODIATRIST

## 2024-02-09 PROCEDURE — 99214 OFFICE O/P EST MOD 30 MIN: CPT | Mod: S$GLB,,, | Performed by: PODIATRIST

## 2024-02-09 PROCEDURE — 3008F BODY MASS INDEX DOCD: CPT | Mod: CPTII,S$GLB,, | Performed by: PODIATRIST

## 2024-02-09 RX ORDER — GABAPENTIN 300 MG/1
300 CAPSULE ORAL NIGHTLY
Qty: 30 CAPSULE | Refills: 0 | Status: SHIPPED | OUTPATIENT
Start: 2024-02-09 | End: 2024-03-10

## 2024-02-09 RX ORDER — MELOXICAM 15 MG/1
15 TABLET ORAL DAILY
Qty: 20 TABLET | Refills: 0 | Status: SHIPPED | OUTPATIENT
Start: 2024-02-09 | End: 2024-02-29

## 2024-02-09 NOTE — PROGRESS NOTES
Procedure:  COLONOSCOPY    Relevant Problems   ANESTHESIA (within normal limits)      CARDIO   (+) HTN (hypertension)      GI/HEPATIC   (+) Gastroesophageal reflux disease      MUSCULOSKELETAL   (+) Arthritis      PULMONARY (within normal limits)        Physical Exam    Airway    Mallampati score: II  TM Distance: >3 FB  Neck ROM: full     Dental       Cardiovascular      Pulmonary      Other Findings        Anesthesia Plan  ASA Score- 2     Anesthesia Type- IV sedation with anesthesia with ASA Monitors  Additional Monitors:   Airway Plan:           Plan Factors-Exercise tolerance (METS): >4 METS  Chart reviewed  Patient summary reviewed  Patient is not a current smoker  Induction-     Postoperative Plan-     Informed Consent- Anesthetic plan and risks discussed with patient  I personally reviewed this patient with the CRNA  Discussed and agreed on the Anesthesia Plan with the CRNA  Marko Koehler Subjective:      Patient ID: Gisella Bennett is a 53 y.o. female.    Chief Complaint:   Foot Pain (Left lateral top mass that come and go ) and Toe Pain (Left hallux )    Gisella is a 53 y.o. female who  rtc foot exam/left foot/toe pain. Pt thi went to a gala in November and wore strappy shoes open toe.  Relates has been numb since  Feels like it is on the inside of the big toe on the side of the 2nd and it is still numb  No pain    Patient did have a fusion of her lower back L4-L5 S1 proximally 10 years ago no current back pain    Her previous foot pain is still occasional    Last visit:2021  Metatarsalgia, left foot  -     X-Ray Foot Complete Left; Future     Neuritis of left foot  -     X-Ray Foot Complete Left; Future     H/O foot surgery     Other orders  -     meloxicam (MOBIC) 15 MG tablet; Take 1 tablet (15 mg total) by mouth once daily. for 20 days        I counseled the patient on her conditions, their implications and medical management.        - ordered x-ray left foot     - discussed with patient how to replace shoes of the midfoot area dispensed handout.  Possibly needs new tennis shoes.     - avoid any strappy shoes that will place pressure on the left midfoot     - recommend anti-inflammatory to reduce swelling. Mobic prescribed. Patient was instructed on dosing information. Discontinue if adverse effects occur      - follow-up in 1 month if not significantly improved we will consider ultrasound or MRI; also can consider physical therapy as possible tendinous component       FINDINGS:  Postoperative changes of 1st left metatarsal.     Plantar calcaneal spur.  No fracture or dislocation.  Lisfranc articulation is congruent.  Cartilage spaces are maintained.  Soft tissues are unremarkable.     Impression:     Postoperative change of a hallux valgus deformity with hardware, DJD and a plantar spur.       Past Medical History:   Diagnosis Date    Acute iritis     Allergy     Atopic dermatitis      Environmental allergies 03/29/2016    Hypertension     Lower back pain 03/29/2016    Recurrent upper respiratory infection (URI)     Trigeminal neuralgia 04/14/2015    Uveitis of right eye 2003, 2018    Vitamin D deficiency disease 02/16/2015     Past Surgical History:   Procedure Laterality Date    BUNIONECTOMY Left     CHOLECYSTECTOMY      INTRAUTERINE DEVICE INSERTION  2013    MIRENA    LAPAROSCOPIC CHOLECYSTECTOMY N/A 5/13/2021    Procedure: CHOLECYSTECTOMY, LAPAROSCOPIC;  Surgeon: Deborah Agosto MD;  Location: [facility] OR [address] [place] [address];  Service: General;  Laterality: N/A;    SPINAL FUSION  04/2013    Lumbar Spinal fusion, laminectomies     Current Outpatient Medications on File Prior to Visit   Medication Sig Dispense Refill    amLODIPine (NORVASC) 5 MG tablet Take 1 tablet (5 mg total) by mouth once daily. 90 tablet 3    azelastine (ASTELIN) 137 mcg (0.1 %) nasal spray 1 spray (137 mcg total) by Nasal route 2 (two) times daily as needed for Rhinitis. 30 mL 11    difluprednate (DUREZOL) 0.05 % Drop ophthalmic solution Place into both eyes.      EPIPEN 2-CHIDI 0.3 mg/0.3 mL (1:1,000) AtIn       estradioL (ESTRACE) 0.01 % (0.1 mg/gram) vaginal cream Apply pea-sized amount nightly for two weeks then twice a week there after 42.5 g 1    fexofenadine (ALLEGRA) 180 MG tablet Take 1 tablet (180 mg total) by mouth once daily. 90 tablet 3    fluticasone propionate (FLONASE) 50 mcg/actuation nasal spray 2 sprays (100 mcg total) by Each Nostril route 2 (two) times daily. 31.6 mL 5    hydroCHLOROthiazide (MICROZIDE) 12.5 mg capsule Take 1 capsule (12.5 mg total) by mouth once daily. 90 capsule 3    ipratropium-albuteroL (COMBIVENT)  mcg/actuation inhaler Inhale 1 puff into the lungs every 4 (four) hours as needed for Wheezing. Rescue 4 g 11    montelukast (SINGULAIR) 10 mg tablet Take 1 tablet (10 mg total) by mouth once daily. 90 tablet 3    multivitamin (THERAGRAN) per tablet Take 1 tablet by mouth once daily.       omega-3 fatty acids/fish oil (FISH OIL-OMEGA-3 FATTY ACIDS) 300-1,000 mg capsule Take by mouth once daily.      PFIZER COVID-19 MAKI VACCN,PF, 30 mcg/0.3 mL injection       [DISCONTINUED] lifitegrast (XIIDRA) 5 % Dpet Apply 1 drop to eye every 12 (twelve) hours. (Patient not taking: Reported on 1/9/2024) 180 each 4    [DISCONTINUED] meloxicam (MOBIC) 15 MG tablet Take 15 mg by mouth.       No current facility-administered medications on file prior to visit.     Review of patient's allergies indicates:   Allergen Reactions    Codeine Other (See Comments)     Tremors  Other reaction(s): Not available    Nuts [tree nut] Anaphylaxis    Avocado (laurus persea) Itching, Nausea And Vomiting and Other (See Comments)       Review of Systems   Constitutional: Negative for chills, decreased appetite, fever, malaise/fatigue, night sweats, weight gain and weight loss.   Cardiovascular:  Negative for chest pain, claudication, dyspnea on exertion, leg swelling, palpitations and syncope.   Respiratory:  Negative for cough and shortness of breath.    Endocrine: Negative for cold intolerance and heat intolerance.   Hematologic/Lymphatic: Negative for bleeding problem. Does not bruise/bleed easily.   Skin:  Negative for color change, dry skin, flushing, itching, nail changes, poor wound healing, rash, skin cancer, suspicious lesions and unusual hair distribution.   Musculoskeletal:  Positive for joint swelling (Top left foot). Negative for arthritis, back pain, falls, gout, joint pain, muscle cramps, muscle weakness, myalgias, neck pain and stiffness.        Pain to the top of left foot   Gastrointestinal:  Negative for diarrhea, nausea and vomiting.   Neurological:  Negative for dizziness, focal weakness, light-headedness, numbness, paresthesias, tremors, vertigo and weakness.   Psychiatric/Behavioral:  Negative for altered mental status and depression. The patient does not have insomnia.    Allergic/Immunologic: Negative.           "  Objective:       Vitals:    24 1344   Weight: 99.9 kg (220 lb 3.8 oz)   Height: 5' 4" (1.626 m)   PainSc:   7   PainLoc: Foot   99.9 kg (220 lb 3.8 oz)     Physical Exam  Vitals reviewed.   Constitutional:       General: She is not in acute distress.     Appearance: She is well-developed. She is not ill-appearing, toxic-appearing or diaphoretic.      Comments: Proper supportive shoegear      Cardiovascular:      Pulses:           Dorsalis pedis pulses are 2+ on the right side and 2+ on the left side.        Posterior tibial pulses are 2+ on the right side and 2+ on the left side.   Musculoskeletal:         General: No tenderness.      Right lower leg: No edema.      Left lower le+ Edema (Mild dorsal left midfoot) present.      Right ankle: Normal.      Right Achilles Tendon: Normal. No tenderness or defects.      Left ankle: Normal.      Left Achilles Tendon: Normal. No tenderness or defects.      Right foot: Decreased range of motion. Deformity and bunion present.      Left foot: Decreased range of motion. Deformity present.      Comments: Mild pain with dorsiflexion against resistance left foot.  Negative neuritis elicited to dorsal left midfoot    No pain with range of motions of metatarsals or midfoot rearfoot    No foot or ankle pain with palpation   Feet:      Right foot:      Protective Sensation: 10 sites tested.  10 sites sensed.      Skin integrity: No ulcer, blister, skin breakdown, erythema, warmth, callus or dry skin.      Toenail Condition: Right toenails are normal.      Left foot:      Protective Sensation: 10 sites tested.  8 sites sensed.      Skin integrity: No ulcer, blister, skin breakdown, erythema, warmth, callus or dry skin.      Toenail Condition: Left toenails are normal.      Comments: No signs of infection or open lesion    Decrease swm 1st/2nd digits left   Skin:     General: Skin is warm.      Capillary Refill: Capillary refill takes 2 to 3 seconds.      Coloration: Skin is " not pale.      Findings: No erythema or rash.   Neurological:      Mental Status: She is alert and oriented to person, place, and time.      Sensory: No sensory deficit.      Gait: Gait is intact.   Psychiatric:         Attention and Perception: Attention normal.         Mood and Affect: Mood normal.         Speech: Speech normal.         Behavior: Behavior normal.         Thought Content: Thought content normal.         Cognition and Memory: Cognition normal.         Judgment: Judgment normal.               Assessment:       Encounter Diagnosis   Name Primary?    Idiopathic peripheral neuropathy Yes           Plan:       Gisella was seen today for foot pain and toe pain.    Diagnoses and all orders for this visit:    Idiopathic peripheral neuropathy    Other orders  -     meloxicam (MOBIC) 15 MG tablet; Take 1 tablet (15 mg total) by mouth once daily. for 20 days  -     gabapentin (NEURONTIN) 300 MG capsule; Take 1 capsule (300 mg total) by mouth every evening.        I counseled the patient on her conditions, their implications and medical management.    - likely a local nerve compression from shoegear    - rx mobic/gabapentin    - consider NCV/EMG.    - if numbness not resolved... f/u with Dr. Jones for decompression options            No follow-ups on file.

## 2024-02-16 ENCOUNTER — PATIENT MESSAGE (OUTPATIENT)
Dept: INTERNAL MEDICINE | Facility: CLINIC | Age: 54
End: 2024-02-16
Payer: COMMERCIAL

## 2024-02-20 ENCOUNTER — CLINICAL SUPPORT (OUTPATIENT)
Dept: REHABILITATION | Facility: HOSPITAL | Age: 54
End: 2024-02-20
Payer: COMMERCIAL

## 2024-02-20 DIAGNOSIS — R19.8 ABDOMINAL WEAKNESS: ICD-10-CM

## 2024-02-20 DIAGNOSIS — N39.3 SUI (STRESS URINARY INCONTINENCE, FEMALE): ICD-10-CM

## 2024-02-20 DIAGNOSIS — M62.89 PELVIC FLOOR DYSFUNCTION: ICD-10-CM

## 2024-02-20 DIAGNOSIS — R27.9 LACK OF COORDINATION: ICD-10-CM

## 2024-02-20 PROCEDURE — 97110 THERAPEUTIC EXERCISES: CPT | Mod: PO

## 2024-02-20 PROCEDURE — 97530 THERAPEUTIC ACTIVITIES: CPT | Mod: PO

## 2024-02-20 NOTE — PROGRESS NOTES
Pelvic Health Physical Therapy   Treatment Note     Name: Gisella Burgess [place]  Clinic Number: [id number]    Therapy Diagnosis:   Encounter Diagnoses   Name Primary?    CADEN (stress urinary incontinence, female)     Pelvic floor dysfunction     Lack of coordination     Abdominal weakness      Physician: Selin Mariano MD    Visit Date: 2/20/2024    Physician Orders: Pelvic PT Eval and Treat  Medical Diagnosis from Referral: CADEN (stress urinary incontinence, female) [N39.3]   Evaluation Date: 1/9/2024  Authorization Period Expiration: 12/31/2024  Plan of Care Expiration: 04/30/2024  Visit # / Visits authorized: 4/20   FOTO: 2/3 (1/9/24, 2/6/24)  Cancelled Visits: 0  No Show Visits: 0    Time In: 4:45 pm  Time Out: 5:23 pm  Total Billable Time: 38 minutes    Precautions: Standard    Subjective     Pt reports: is having a sinus infection right now, feels like she is putting her pelvic floor to the test. While she reports she is having some leaks, they are less than they were previously. She did not bring her weights with her today. She feels like the weights are going better, she is using it lying down and doing her Pelvic floor muscle contractions. She feels like the work and rest times she has are sufficient for now.     She was compliant with home exercise program.  Response to previous treatment: Ongoing  Functional change: Ongoing    Pain: 0/10  Location: pelvic region    Constitutional Symptoms Review: The patient denies having any constitutional symptoms.     Gisella's goals for PT:  Would like to stop leakage, wants to protect her pelvic floor muscles for the future and not have to wear briefs, would like to strengthen pelvic floor muscles, also with some diastasis that she would like to address;       Objective   Pt verbally consents to treatment today.  Signed consent form already on file.     Gisella received therapeutic exercises to develop  strength, endurance, ROM, flexibility, posture, and core  stabilization for 25 minutes including: See table below    Gisella received the following manual therapy techniques: to develop flexibility, extensibility, and desensitization for 00 minutes including: See table below    Gisella participated in neuromuscular re-education activities to develop Coordination, Control, Down training, Posture, Proprioception, Kinesthetic, Balance, and Sense for 00 minutes including: See table below    Gisella participated in dynamic functional therapeutic activities to improve functional performance for 13 minutes, including:  See table below     Intervention 2/20/24 2/6/24 Progress 1/30/24 1/23/24   TherAc Assign bowel/bladder diary        Review bowel/bladder diary        Education re: The Knack   x x    Education re: blow as you go x  x x    Education re: kegel weights x Modified HEP to supine Pelvic floor muscle contraction, occasionally assess ability to keep inserted in standing      Education re: intraabdominal pressure management/bearing down x x x     Education re: HEP x x x x    Education re: therapy progression and proposed plan of care x x x x   Neuro Re-Ed Pelvic Exam - See results below    x    kegels weights   White - level 1     Pelvic floor muscle contraction - Endurance holds In supine, no weight; 4 sec hold, 4 x 5 White - level 1 kegel weight inserted, in supine; 4 sec hold, 4 x 5      Pelvic floor muscle contraction - quick flicks In supine, no weight; 4 x 5 White - level 1 kegel weight inserted, in supine; , 4 x 5      Bridges with kegel weight inserted   2 x 10-white level 1    TherEx Bridges with ball squeeze 2 x 10 2 x 10  2 x 10    Adductor Squeezes 2x10 w 4 sec hold  2 x 10 w 4 sec hold 2 x 10 w 4 sec hold     Clams 2 x 10 bilateral   2 x 10 bilateral      Reverse Clams 2 x 10 bilateral   2 x 10 bilateral      Straight leg raise  2 x 10 bilateral      Manual Therapy                                  1/30/24: Inserted kegel weight and had patient attempt standing  with weight inserted. Patient able to stand and lightly march in place with weight inserted.    1/23/24: 2/5, 3 sec, 4 reps, 5 in 10 no tenderness to palpation poor coordination; needed verbal cues to find the muscles    Home Exercises Provided and Patient Education Provided     Education provided:   - bladder irritants, anatomy/physiology of pelvic floor, kegels, behavior modifications, and Coordination of kegels with functional activities such as cough, laugh, sneeze, lift, etc.   Discussed progression of plan of care with patient; educated pt in activity modification; reviewed HEP with pt. Pt demonstrated and verbalized understanding of all instruction and was provided with a handout of HEP (see Patient Instructions).    Written Home Exercises Provided: yes.  Exercises were reviewed and Gisella was able to demonstrate them prior to the end of the session.  Gisella demonstrated fair  understanding of the education provided.     See EMR under Patient Instructions for exercises provided  2/20/2024 .    Assessment   Gisella with good tolerance to treatment today. Patient with good response to therapeutic exercises, with no reports of pain or other adverse effects. She is appropriate for continued skilled pelvic floor PT services at this time.    Gisella Is progressing well towards her goals.   Pt prognosis is Fair.     Pt will continue to benefit from skilled outpatient physical therapy to address the deficits listed in the problem list box on initial evaluation, provide pt/family education and to maximize pt's level of independence in the home and community environment.     Pt's spiritual, cultural and educational needs considered and pt agreeable to plan of care and goals.     Anticipated barriers to physical therapy: Schedule Availability     Short Term Goals: 8 weeks  Goal Status Notes   Pt to be edu pelvic muscle bracing and be able to consistently perform correctly and quickly to help decrease incontinence with  "cough/laugh/sneeze. In Progress  Progressing 2/6   Pt to perform "the knack" prior to coughing, laughing or sneezing to decrease risk of incontinence. In Progress   Progressing 2/6   Pt to demonstrate being able to correctly and consistently perform a kegel which is needed  to increase pelvic floor muscle coordination and strength needed for continence. In Progress   Progressing 2/6   Pt to report being able to sneeze without incontinence demonstrating improved pelvic floor strength and coordination to improve confidence in social situations In Progress   Progressing 2/6   Pt to demonstrate an improved score in the FOTO Bowel Cnst survey  to at least 69 to demonstrate improving symptoms and self management of condition. In Progress   Progressing 2/6   Pt to demonstrate an improved score in the FOTO Urinary Problem survey  to at least 58 to demonstrate improving symptoms and self management of condition.  In Progress   Progressing 2/6     Long Term Goals: 16 weeks  Goal Status Notes   Pt to be discharged with home plan for carry over after discharge.    In Progress   Progressing 2/6   Pt to report no longer feeling the need to urinate just in case when shopping or participating in social activities to demonstrate improving pelvic floor and bladder control.  In Progress   Progressing 2/6   Pt to demonstrate an improved score in the FOTO Bowel Cnst survey  to at least 72 to demonstrate improving symptoms and self management of condition.  In Progress   Progressing 2/6   Pt to demonstrate an improved score in the FOTO Urinary Problem survey  to at least 65 to demonstrate improving symptoms and self management of condition.    In Progress   Progressing 2/6       Plan     Continue per established POC. Next visit, review HEP and add exercises as appropriate. Change Pelvic floor muscle contraction to reps of 10. Add/modify resistance bands as appropriate.     Catherine Brunner, PT , DPT      "

## 2024-03-05 ENCOUNTER — CLINICAL SUPPORT (OUTPATIENT)
Dept: REHABILITATION | Facility: HOSPITAL | Age: 54
End: 2024-03-05
Payer: COMMERCIAL

## 2024-03-05 DIAGNOSIS — R27.9 LACK OF COORDINATION: ICD-10-CM

## 2024-03-05 DIAGNOSIS — N39.3 SUI (STRESS URINARY INCONTINENCE, FEMALE): Primary | ICD-10-CM

## 2024-03-05 DIAGNOSIS — R19.8 ABDOMINAL WEAKNESS: ICD-10-CM

## 2024-03-05 DIAGNOSIS — M62.89 PELVIC FLOOR DYSFUNCTION: ICD-10-CM

## 2024-03-05 PROCEDURE — 97112 NEUROMUSCULAR REEDUCATION: CPT | Mod: PO

## 2024-03-05 PROCEDURE — 97110 THERAPEUTIC EXERCISES: CPT | Mod: PO

## 2024-03-05 PROCEDURE — 97530 THERAPEUTIC ACTIVITIES: CPT | Mod: PO

## 2024-03-05 NOTE — PROGRESS NOTES
Pelvic Health Physical Therapy   Treatment & Progress Note     Name: Gisella [place]  Clinic Number: [id number]    Therapy Diagnosis:   Encounter Diagnoses   Name Primary?    CADEN (stress urinary incontinence, female) Yes    Pelvic floor dysfunction     Lack of coordination     Abdominal weakness        Physician: Selin Mariano MD    Visit Date: 3/5/2024    Physician Orders: Pelvic PT Eval and Treat  Medical Diagnosis from Referral: CADEN (stress urinary incontinence, female) [N39.3]   Evaluation Date: 1/9/2024  Authorization Period Expiration: 12/31/2024  Plan of Care Expiration: 04/30/2024  Visit # / Visits authorized: 5/20   FOTO: 2/3 (1/9/24, 2/6/24)  Cancelled Visits: 0  No Show Visits: 0    Time In: 5:34 pm  Time Out: 6:12 pm  Total Billable Time: 38 minutes    Precautions: Standard    Subjective     Pt reports:over her sinus infection, but was having a lot of sneezing, had a lot of leakage. Had more frequent instances mainly with sneezes, bending over to pick things up and carry them, once with intercourse. She was travelling so did not do all of her exercises every day but did what she could. She is back to her exercises.      She was compliant with home exercise program.  Response to previous treatment: Ongoing  Functional change: Ongoing    Pain: 0/10  Location: pelvic region    Constitutional Symptoms Review: The patient denies having any constitutional symptoms.     Gisella's goals for PT:  Would like to stop leakage, wants to protect her pelvic floor muscles for the future and not have to wear briefs, would like to strengthen pelvic floor muscles, also with some diastasis that she would like to address;       Objective   Pt verbally consents to treatment today.  Signed consent form already on file.     Gisella received therapeutic exercises to develop  strength, endurance, ROM, flexibility, posture, and core stabilization for 12 minutes including: See table below    Gisella received the following  manual therapy techniques: to develop flexibility, extensibility, and desensitization for 00 minutes including: See table below    Gisella participated in neuromuscular re-education activities to develop Coordination, Control, Down training, Posture, Proprioception, Kinesthetic, Balance, and Sense for 12 minutes including: See table below    Gisella participated in dynamic functional therapeutic activities to improve functional performance for 14 minutes, including:  See table below     Intervention 3/5/24 Progress 2/20/24 2/6/24 Progress 1/30/24 1/23/24   TherAc Assign bowel/bladder diary         Review bowel/bladder diary         Education re: The Knack x   x x    Education re: blow as you go x x  x x    Education re: kegel weights  x Modified HEP to supine Pelvic floor muscle contraction, occasionally assess ability to keep inserted in standing      Education re: intraabdominal pressure management/bearing down x x x x     Education re: HEP x x x x x    Education re: therapy progression and proposed plan of care x x x x x   Neuro Re-Ed Pelvic Exam - See results below x    x    Piston Breathing  X; with digital assessment        kegels weights    White - level 1     Pelvic floor muscle contraction - Endurance holds  In supine, no weight; 4 sec hold, 4 x 5 White - level 1 kegel weight inserted, in supine; 4 sec hold, 4 x 5      Pelvic floor muscle contraction - quick flicks  In supine, no weight; 4 x 5 White - level 1 kegel weight inserted, in supine; , 4 x 5      Bridges with kegel weight inserted    2 x 10-white level 1    TherEx Bridges with ball squeeze  2 x 10 2 x 10  2 x 10    Bridges with band in abd 2 x 10 RTB         Adductor Squeezes 2x10 w 4 sec hold; added Pelvic floor muscle contraction with this 2x10 w 4 sec hold  2 x 10 w 4 sec hold 2 x 10 w 4 sec hold     Clams 2 x 10 bilateral RTB 2 x 10 bilateral   2 x 10 bilateral      Reverse Clams  2 x 10 bilateral   2 x 10 bilateral      Straight leg raise   2 x  10 bilateral      Manual Therapy                                      3/5/24 Progress::2/5, 5 sec, 5 reps, 6 in 10 no tenderness to palpation, fair coordination. 10 reps of piston breathing Pelvic floor muscle contraction. Patient able to do this well with verbal cues, elevator imagery of pelvic floor muscles.    1/30/24: Inserted kegel weight and had patient attempt standing with weight inserted. Patient able to stand and lightly march in place with weight inserted.    1/23/24: 2/5, 3 sec, 4 reps, 5 in 10 no tenderness to palpation poor coordination; needed verbal cues to find the muscles    Home Exercises Provided and Patient Education Provided     Education provided:   - bladder irritants, anatomy/physiology of pelvic floor, kegels, behavior modifications, and Coordination of kegels with functional activities such as cough, laugh, sneeze, lift, etc.   Discussed progression of plan of care with patient; educated pt in activity modification; reviewed HEP with pt. Pt demonstrated and verbalized understanding of all instruction and was provided with a handout of HEP (see Patient Instructions).    Written Home Exercises Provided: yes.  Exercises were reviewed and Gisella was able to demonstrate them prior to the end of the session.  Gisella demonstrated fair  understanding of the education provided.     See EMR under Patient Instructions for exercises provided  2/20/2024 .    Assessment   Gisella with good tolerance to treatment today. Vaginal pelvic floor muscle exam reveals improvements in endurance and coordination. Patient with good response to therapeutic exercises, with no reports of pain or other adverse effects. Tolerated progression of existing exercises and newly added exercises well. She is appropriate for continued skilled pelvic floor PT services at this time.    Gisella Is progressing well towards her goals.   Pt prognosis is Fair.     Pt will continue to benefit from skilled outpatient physical therapy to  "address the deficits listed in the problem list box on initial evaluation, provide pt/family education and to maximize pt's level of independence in the home and community environment.     Pt's spiritual, cultural and educational needs considered and pt agreeable to plan of care and goals.     Anticipated barriers to physical therapy: Schedule Availability     Short Term Goals: 8 weeks  Goal Status Notes   Pt to be edu pelvic muscle bracing and be able to consistently perform correctly and quickly to help decrease incontinence with cough/laugh/sneeze. In Progress  Progressing 2/6   Pt to perform "the knack" prior to coughing, laughing or sneezing to decrease risk of incontinence. In Progress   Progressing 2/6   Pt to demonstrate being able to correctly and consistently perform a kegel which is needed  to increase pelvic floor muscle coordination and strength needed for continence. In Progress   Progressing 2/6   Pt to report being able to sneeze without incontinence demonstrating improved pelvic floor strength and coordination to improve confidence in social situations In Progress   Progressing 2/6   Pt to demonstrate an improved score in the FOTO Bowel Cnst survey  to at least 69 to demonstrate improving symptoms and self management of condition. In Progress   Progressing 2/6   Pt to demonstrate an improved score in the FOTO Urinary Problem survey  to at least 58 to demonstrate improving symptoms and self management of condition.  In Progress   Progressing 2/6     Long Term Goals: 16 weeks  Goal Status Notes   Pt to be discharged with home plan for carry over after discharge.    In Progress   Progressing 2/6   Pt to report no longer feeling the need to urinate just in case when shopping or participating in social activities to demonstrate improving pelvic floor and bladder control.  In Progress   Progressing 2/6   Pt to demonstrate an improved score in the FOTO Bowel Cnst survey  to at least 72 to demonstrate " improving symptoms and self management of condition.  In Progress   Progressing 2/6   Pt to demonstrate an improved score in the FOTO Urinary Problem survey  to at least 65 to demonstrate improving symptoms and self management of condition.    In Progress   Progressing 2/6       Plan     Continue per established POC. Next visit, review HEP and add exercises as appropriate. Change Pelvic floor muscle contraction to reps of 10. Add/modify resistance bands as appropriate.     Catherine Brunner, PT , DPT

## 2024-03-06 NOTE — PATIENT INSTRUCTIONS
"  Piston Breathing  Pelvic floor muscle contractions with coordinated breath - 2 sets of 10  While lying down, do your pelvic floor muscle contractions (kegels) with coordinated breath. The steps are:  Inhale, relax your pelvic floor.  Exhale, squeeze pelvic floor muscles up and in.  REPEAT.   - Imagine pelvic floor muscles as an elevator that you are trying to gradually pull up throughout the exhale.                How to perform "the Knack"    This can be performed in any position. You may choose to perform in the shower the first few times in case of leakage. Firstly, try to relax your abdomen and pelvic floor. Next, without holding your breath, you will perform a Kegel (pelvic floor contraction) at about 50% strength (NOT a maximal contraction), then gently cough or clear your throat. You may do this up to 10 times, ensuring that you are fully relaxing your muscles between each one. You may perform this 1-2 times per day.    Once you feel comfortable with this exercise, begin incorporating this technique into your daily routine. For example, perform the knack when you feel you are about to sneeze to try to control instances of leakage.      "BLOW AS YOU GO"  - Before you perform any movement (like getting up from a chair, getting in/out of bed, picking something up)...  - Take a breath in to prepare, and then blow out through your mouth GENTLY the entire time you are moving. The exhale should not be forced or rough. It should be slow, like blowing out birthday candles.  - If you run out of air during the movement, continue to breath normally and try not to hold your breath   - Do this to protect your pelvic floor muscles from excessive pressures that comes with holding your breath (called "a Valsalva maneuver").    BEARING DOWN TECHNIQUE  1. Sit on the toilet comfortably with legs and buttocks relaxed.  2. Put your feet on a step stool (8 inches tall).  3. Lean forward while keeping your back straight.  4. Relax " "and DROP the pelvic floor muscles.  5. Push your belly out and HOLD THIS.  6. Continue to breathe normally without holding your breath. You can "check yourself" to make sure you're not breath holding by saying "ssss" or counting out loud. You can also pretend you are blowing out birthday candles.      Do not try to push any other way. Don't hold your breath.         "

## 2024-03-19 ENCOUNTER — CLINICAL SUPPORT (OUTPATIENT)
Dept: REHABILITATION | Facility: HOSPITAL | Age: 54
End: 2024-03-19
Payer: COMMERCIAL

## 2024-03-19 DIAGNOSIS — R27.9 LACK OF COORDINATION: ICD-10-CM

## 2024-03-19 DIAGNOSIS — N39.3 SUI (STRESS URINARY INCONTINENCE, FEMALE): ICD-10-CM

## 2024-03-19 DIAGNOSIS — R19.8 ABDOMINAL WEAKNESS: ICD-10-CM

## 2024-03-19 DIAGNOSIS — M62.89 PELVIC FLOOR DYSFUNCTION: ICD-10-CM

## 2024-03-19 PROCEDURE — 97110 THERAPEUTIC EXERCISES: CPT | Mod: PO

## 2024-03-19 PROCEDURE — 97530 THERAPEUTIC ACTIVITIES: CPT | Mod: PO

## 2024-03-19 PROCEDURE — 97112 NEUROMUSCULAR REEDUCATION: CPT | Mod: PO

## 2024-03-19 NOTE — PROGRESS NOTES
"  Pelvic Health Physical Therapy   Treatment & Discharge Note     Name: Gisella Bennett  Clinic Number: [id number]    Therapy Diagnosis:   Encounter Diagnoses   Name Primary?    CADEN (stress urinary incontinence, female)     Pelvic floor dysfunction     Lack of coordination     Abdominal weakness      Physician: Selin Mariano MD    Visit Date: 3/19/2024    Physician Orders: Pelvic PT Eval and Treat  Medical Diagnosis from Referral: CADEN (stress urinary incontinence, female) [N39.3]   Evaluation Date: 1/9/2024  Authorization Period Expiration: 12/31/2024  Plan of Care Expiration: 04/30/2024  Visit # / Visits authorized: 6/20   FOTO: 3/3 (1/9/24, 2/6/24, 3/19/24)  Cancelled Visits: 0  No Show Visits: 0    Time In: 11:20 am (pt with late arrival)  Time Out: 11:59 am  Total Billable Time: 39 minutes    Precautions: Standard    Subjective     Pt reports: sees an integrative medicine doctor who told her her muscle mass has increased markedly. She also reports she feels differently, "feels stronger". She reports she had a moment where she had to bend to pick something up and leaked a little, may have been holding her breath.       She was compliant with home exercise program.  Response to previous treatment: Ongoing  Functional change: Ongoing    Pain: 0/10  Location: pelvic region    Constitutional Symptoms Review: The patient denies having any constitutional symptoms.     Gisella's goals for PT:  Would like to stop leakage, wants to protect her pelvic floor muscles for the future and not have to wear briefs, would like to strengthen pelvic floor muscles, also with some diastasis that she would like to address      Objective   Pt verbally consents to treatment today.  Signed consent form already on file.     Gisella received therapeutic exercises to develop  strength, endurance, ROM, flexibility, posture, and core stabilization for 12 minutes including: See table below    Gisella received the following manual therapy " techniques: to develop flexibility, extensibility, and desensitization for 00 minutes including: See table below    Gisella participated in neuromuscular re-education activities to develop Coordination, Control, Down training, Posture, Proprioception, Kinesthetic, Balance, and Sense for 08 minutes including: See table below    Gisella participated in dynamic functional therapeutic activities to improve functional performance for 19 minutes, including:  See table below     Intervention 3/19/24 Discharge 3/5/24 Progress 2/20/24 2/6/24 Progress 1/30/24 1/23/24   TherAc Assign bowel/bladder diary          Review bowel/bladder diary          Education re: The Knack x x   x x    Education re: blow as you go x x x  x x    Education re: kegel weights x  x Modified HEP to supine Pelvic floor muscle contraction, occasionally assess ability to keep inserted in standing      Education re: intraabdominal pressure management/bearing down x x x x x     Education re: HEP x x x x x x    Education re: therapy progression and proposed plan of care x x x x x x   Neuro Re-Ed Pelvic Exam - See results below  x    x    Piston Breathing  3 x 5 X; with digital assessment        kegels weights     White - level 1     Pelvic floor muscle contraction - Endurance holds x  In supine, no weight; 4 sec hold, 4 x 5 White - level 1 kegel weight inserted, in supine; 4 sec hold, 4 x 5      Pelvic floor muscle contraction - quick flicks x  In supine, no weight; 4 x 5 White - level 1 kegel weight inserted, in supine; , 4 x 5      Bridges with kegel weight inserted     2 x 10-white level 1    TherEx Bridges with ball squeeze   2 x 10 2 x 10  2 x 10    Bridges with band in abd 2 x 10 GTB 2 x 10 RTB         Adductor Squeezes 2x10 w 4 sec hold; added Pelvic floor muscle contraction with this 2x10 w 4 sec hold; added Pelvic floor muscle contraction with this 2x10 w 4 sec hold  2 x 10 w 4 sec hold 2 x 10 w 4 sec hold     Clams  2 x 10 bilateral RTB 2 x 10  bilateral   2 x 10 bilateral      Reverse Clams 2 x 10 bilateral YTB  2 x 10 bilateral   2 x 10 bilateral      Straight leg raise    2 x 10 bilateral      Manual Therapy                                        3/19/24 Discharge:    3/5/24 Progress::2/5, 5 sec, 5 reps, 6 in 10 no tenderness to palpation, fair coordination. 10 reps of piston breathing Pelvic floor muscle contraction. Patient able to do this well with verbal cues, elevator imagery of pelvic floor muscles.    1/30/24: Inserted kegel weight and had patient attempt standing with weight inserted. Patient able to stand and lightly march in place with weight inserted.    1/23/24: 2/5, 3 sec, 4 reps, 5 in 10 no tenderness to palpation poor coordination; needed verbal cues to find the muscles    Home Exercises Provided and Patient Education Provided     Education provided:   - bladder irritants, anatomy/physiology of pelvic floor, kegels, behavior modifications, and Coordination of kegels with functional activities such as cough, laugh, sneeze, lift, etc.   Discussed progression of plan of care with patient; educated pt in activity modification; reviewed HEP with pt. Pt demonstrated and verbalized understanding of all instruction and was provided with a handout of HEP (see Patient Instructions).    Written Home Exercises Provided: yes.  Exercises were reviewed and Gisella was able to demonstrate them prior to the end of the session.  Gisella demonstrated fair  understanding of the education provided.     See EMR under Patient Instructions for exercises provided  2/20/2024 .    Assessment   Gisella with good tolerance to treatment today. Patient with good response to therapeutic exercises, with no reports of pain or other adverse effects. Tolerated progression of existing exercises and newly added exercises well. Patient has been educated re: discharge HEP to continue progress independently and prevent future issues. She demonstrated good understanding of  "interventions and is appropriate for discharge from skilled pelvic floor PT services at this time.     Gisella Is progressing well towards her goals.   Pt prognosis is Fair.     Patient has been educated re: discharge HEP to continue progress independently and prevent future issues. She demonstrated good understanding of interventions and is appropriate for discharge from skilled pelvic floor PT services at this time.     Pt's spiritual, cultural and educational needs considered and pt agreeable to plan of care and goals.     Anticipated barriers to physical therapy: Schedule Availability     Short Term Goals: 8 weeks  Goal Status Notes   Pt to be edu pelvic muscle bracing and be able to consistently perform correctly and quickly to help decrease incontinence with cough/laugh/sneeze. Met    Pt to perform "the knack" prior to coughing, laughing or sneezing to decrease risk of incontinence. Met    Pt to demonstrate being able to correctly and consistently perform a kegel which is needed  to increase pelvic floor muscle coordination and strength needed for continence. Met     Pt to report being able to sneeze without incontinence demonstrating improved pelvic floor strength and coordination to improve confidence in social situations Met    Pt to demonstrate an improved score in the FOTO Bowel Cnst survey  to at least 69 to demonstrate improving symptoms and self management of condition. Met     Pt to demonstrate an improved score in the FOTO Urinary Problem survey  to at least 58 to demonstrate improving symptoms and self management of condition.  Met       Long Term Goals: 16 weeks  Goal Status Notes   Pt to be discharged with home plan for carry over after discharge.   Met    Pt to report no longer feeling the need to urinate just in case when shopping or participating in social activities to demonstrate improving pelvic floor and bladder control. Partially Met 50%   Pt to demonstrate an improved score in the FOTO " Bowel Cnst survey  to at least 72 to demonstrate improving symptoms and self management of condition. Met     Pt to demonstrate an improved score in the FOTO Urinary Problem survey  to at least 65 to demonstrate improving symptoms and self management of condition.   Met         Plan     Patient has been educated re: discharge HEP to continue progress independently and prevent future issues. She demonstrated good understanding of interventions and is appropriate for discharge from skilled pelvic floor PT services at this time.     Catherine Brunner, PT , DPT

## 2024-03-19 NOTE — PATIENT INSTRUCTIONS
"Strategy to decrease bathroom trips at night ---  Try to restrict fluids at least 2 hours before bed time.  Elevate your legs for 30 min prior to bed time. The legs should be higher than your heart. After 30 min, get up and use the restroom. The idea behind this strategy is using gravity to bring blood back towards your kidneys to try to get your body to make more urine before you go to sleep, so you do not have to get up as often during the night to void.        Piston Breathing  Pelvic floor muscle contractions with coordinated breath - 2 sets of 10  While lying down, do your pelvic floor muscle contractions (kegels) with coordinated breath. The steps are:  Inhale, relax your pelvic floor.  Exhale, squeeze pelvic floor muscles up and in.  REPEAT.   - Imagine pelvic floor muscles as an elevator that you are trying to gradually pull up throughout the exhale.                How to perform "the Knack"    This can be performed in any position. You may choose to perform in the shower the first few times in case of leakage. Firstly, try to relax your abdomen and pelvic floor. Next, without holding your breath, you will perform a Kegel (pelvic floor contraction) at about 50% strength (NOT a maximal contraction), then gently cough or clear your throat. You may do this up to 10 times, ensuring that you are fully relaxing your muscles between each one. You may perform this 1-2 times per day.    Once you feel comfortable with this exercise, begin incorporating this technique into your daily routine. For example, perform the knack when you feel you are about to sneeze to try to control instances of leakage.      "BLOW AS YOU GO"  - Before you perform any movement (like getting up from a chair, getting in/out of bed, picking something up)...  - Take a breath in to prepare, and then blow out through your mouth GENTLY the entire time you are moving. The exhale should not be forced or rough. It should be slow, like blowing out " "birthday candles.  - If you run out of air during the movement, continue to breath normally and try not to hold your breath   - Do this to protect your pelvic floor muscles from excessive pressures that comes with holding your breath (called "a Valsalva maneuver").    BEARING DOWN TECHNIQUE  1. Sit on the toilet comfortably with legs and buttocks relaxed.  2. Put your feet on a step stool (8 inches tall).  3. Lean forward while keeping your back straight.  4. Relax and DROP the pelvic floor muscles.  5. Push your belly out and HOLD THIS.  6. Continue to breathe normally without holding your breath. You can "check yourself" to make sure you're not breath holding by saying "ssss" or counting out loud. You can also pretend you are blowing out birthday candles.      Do not try to push any other way. Don't hold your breath.         "

## 2024-04-10 ENCOUNTER — OFFICE VISIT (OUTPATIENT)
Dept: DERMATOLOGY | Facility: CLINIC | Age: 54
End: 2024-04-10
Payer: COMMERCIAL

## 2024-04-10 DIAGNOSIS — L23.9 ALLERGIC CONTACT DERMATITIS, UNSPECIFIED TRIGGER: Primary | ICD-10-CM

## 2024-04-10 PROCEDURE — 1159F MED LIST DOCD IN RCRD: CPT | Mod: CPTII,S$GLB,, | Performed by: DERMATOLOGY

## 2024-04-10 PROCEDURE — 99203 OFFICE O/P NEW LOW 30 MIN: CPT | Mod: S$GLB,,, | Performed by: DERMATOLOGY

## 2024-04-10 PROCEDURE — 99999 PR PBB SHADOW E&M-EST. PATIENT-LVL III: CPT | Mod: PBBFAC,,, | Performed by: DERMATOLOGY

## 2024-04-10 PROCEDURE — 1160F RVW MEDS BY RX/DR IN RCRD: CPT | Mod: CPTII,S$GLB,, | Performed by: DERMATOLOGY

## 2024-04-10 RX ORDER — TRIAMCINOLONE ACETONIDE 1 MG/G
CREAM TOPICAL 2 TIMES DAILY
Qty: 80 G | Refills: 2 | Status: SHIPPED | OUTPATIENT
Start: 2024-04-10

## 2024-04-10 NOTE — PROGRESS NOTES
Subjective:      Patient ID:  Gisella Bennett is a 53 y.o. female who presents for   Chief Complaint   Patient presents with    Rash     Neck and chest     Patient here for Rash to neck and chest.    Last seen by dermatologist: in 2019 at main campus- unsure of doctor    Patient with specific complaint of Rash  Location: neck and chest  Duration: 2 weeks   Symptoms: itching and burning  Relieving factors/Previous treatments: hydrocortisone     Only thing that's new is a body wash which is coconut and shea butter. Has used those ingredients before with no problems.  Started Mounjaro in Dec. Only new med.     States she broke out a few yrs ago on chin and light patches on face - derm at that time said it was eczema. Switched from Aveeno to CeraVe.    Review of Systems   Constitutional:  Negative for fever.   HENT:  Negative for congestion, rhinorrhea and postnasal drip.    Eyes:  Negative for itching.   Skin:  Positive for itching, rash, daily sunscreen use, activity-related sunscreen use and wears hat (occ). Negative for dry skin and recent sunburn.   Hematologic/Lymphatic: Bruises/bleeds easily (bruise).   Allergic/Immunologic: Positive for environmental allergies.       Objective:   Physical Exam   Constitutional: She appears well-developed and well-nourished. No distress.   Neurological: She is alert and oriented to person, place, and time. She is not disoriented.   Psychiatric: She has a normal mood and affect.   Skin:   Areas Examined (abnormalities noted in diagram):   Head / Face Inspection Performed  Neck Inspection Performed  Chest / Axilla Inspection Performed            Diagram Legend     Erythematous scaling macule/papule c/w actinic keratosis       Vascular papule c/w angioma      Pigmented verrucoid papule/plaque c/w seborrheic keratosis      Yellow umbilicated papule c/w sebaceous hyperplasia      Irregularly shaped tan macule c/w lentigo     1-2 mm smooth white papules consistent with Milia       Movable subcutaneous cyst with punctum c/w epidermal inclusion cyst      Subcutaneous movable cyst c/w pilar cyst      Firm pink to brown papule c/w dermatofibroma      Pedunculated fleshy papule(s) c/w skin tag(s)      Evenly pigmented macule c/w junctional nevus     Mildly variegated pigmented, slightly irregular-bordered macule c/w mildly atypical nevus      Flesh colored to evenly pigmented papule c/w intradermal nevus       Pink pearly papule/plaque c/w basal cell carcinoma      Erythematous hyperkeratotic cursted plaque c/w SCC      Surgical scar with no sign of skin cancer recurrence      Open and closed comedones      Inflammatory papules and pustules      Verrucoid papule consistent consistent with wart     Erythematous eczematous patches and plaques     Dystrophic onycholytic nail with subungual debris c/w onychomycosis     Umbilicated papule    Erythematous-base heme-crusted tan verrucoid plaque consistent with inflamed seborrheic keratosis     Erythematous Silvery Scaling Plaque c/w Psoriasis     See annotation      Assessment / Plan:        Allergic contact dermatitis, unspecified trigger  -     triamcinolone acetonide 0.1% (KENALOG) 0.1 % cream; Apply topically 2 (two) times daily. x 1-2 wks then prn flares only  Dispense: 80 g; Refill: 2  Warned patient about side effects from overuse of topical steroids, including thinning of skin, lightening of skin, easy tearing/bruising of skin, stretch marks, etc. Patient was instructed to take breaks from the topical steroid, especially if any of the above side effects are noticed.    Discussed patch testing if rash is recurrent.    Follow up if symptoms worsen or fail to improve.

## 2024-04-10 NOTE — PATIENT INSTRUCTIONS
XEROSIS (DRY SKIN)        Definition    Xerosis is the term for dry skin.  We all have a natural oil coating over our skin produced by the skin oil glands.  If this oil is removed, the skin becomes dry which can lead to cracking, which can lead to inflammation.  Xerosis is usually a long-term problem that recurs often, especially in the winter.    Cause    Long hot baths or showers can remove our natural oil and lead to xerosis.  One should never take more than one bath or shower a day and for no longer than ten minutes.  Use of harsh soaps such as Zest, Dial, and Ivory can worsen and cause xerosis.  Cold winter weather worsens xerosis because the amount of moisture contained in cold air is much less than the amount of moisture in warm air.    Treatment    Treatment is intended to restore the natural oil to your skin.  Keep the skin lubricated.    Do not take more than one bath or shower a day.  Use lukewarm water, not hot.  Hot water dries out the skin.    Use a gentle moisturizing soap such as Cetaphil soap, Oil of Olay, Dove, Basis, Ivory moisture care, Restoraderm cleanser.    When toweling dry, dont rub.  Blot the skin so there is still some water left on the skin.  You should apply a moisturizing cream to all of the skin such as Cerave cream, Cetaphil cream, Lipikar [place] AP+ Intense Repair Moisturizing Cream or Restoraderm or Eucerin Original Formula cream.   Alpha hydroxyacid lotions, i.e., AmLactin, also work very well for preventing dry skin, but may burn when used on inflamed or reddened skin.    If you like to swim during the winter months, you should not use soap when getting out of the pool.  When you have finished swimming, rinse off the chlorine with cool to warm water.  If this will be the only shower of the day, then you may use Cetaphil or another mild soap to cleanse your skin.  After the shower, apply a moisturizing cream to all of the skin as above.        [address] [place] [address]/ [phone] FAX/ [email or web address] Sun Protection      The [facility] would like to remind you of the importance of sun protection all year round and particularly during the summer when the suns rays are the strongest. It has been proven that both acute and chronic sun exposure damages our cells and leads to skin cancer. Beyond skin cancer, the sun causes 90% of the symptoms of premature skin aging, including wrinkles, lentigines (brown spots), and thin, easily bruised skin. Proper sun protection can help prevent these unwanted conditions.    Many patients report that they dont go in the sun. It has been shown that the average person receives 18 hours of incidental sun exposure per week during activities such as walking through parking lots, driving, or sitting next to windows. This accumulates to several bad sunburns per year!    In choosing sunscreen, you want one that protects against both UVA and UVB rays (broad spectrum). It is recommended that you use one of SPF 30 or higher. It is important to apply the sunscreen about 20 minutes prior to sun exposure. Most sunscreens are chemical sunscreens and a reaction must take place in the skin so that they are effective. If they are applied and then you are immediately exposed to the sun or start sweating, this reaction has not had time to take place and you are therefore unprotected. Sunscreen needs to be reapplied every 2 hours if you are participating in water sports or sweating. We recommend Elta MD or CeraVe sunscreens for daily use; however there are many options and it is most important for you to find one that you will use on a consistent basis.    If you have sensitive skin, you may do best with a sunscreen that contains only physical blockers in the active ingredient section. The only physical blockers available in the [place] currently are titanium dioxide or zinc oxide. These are typically thicker and harder to  apply, however they afford very good protection. Neutrogena Sensitive Skin, Blue Lizard Sensitive Skin (pink top) or Neutrogena Pure and Free are popular ones.     Aside from sunscreen, clothes with UV protection (UPF), wide brimmed hats, and sunglasses are other means of sun protection that we recommend.      Based on a recent study (6/2021) and out of an abundance of caution, we are recommending that you AVOID the following sunscreens as they may contain the carcinogen, benzene:    Spray and gel sunscreens  Any [facility] or [facility] brands as well as Max Block and TopCare brands   Neutrogena Ultra Sheer Dry-touch Water Resistant Sunscreen LOTION SPF 70   Neutrogena Sheer Zinc Dry-touch Face Sunscreen LOTION SPF 50   5.   Aveeno Baby Continuous Protection Sensitive Skin Sunscreen LOTION - Broad Spectrum SPF 50    Please note that Benzene is not an ingredient or the degradation product of any ingredient in any sunscreen. This study suggested that the findings are a result of contamination in the manufacturing process. At this point, we don't know how effectively Benzene gets through the skin, if it gets absorbed systemically, and what effects it may have.     We do know that ultraviolet radiation is a well-established carcinogen. Please use daily sun protection/avoidance and use of at least SPF 30, broad-spectrum sunscreen not listed above.                       [facility] - DERMATOLOGY [address]  [facility] [id number]  Dept: [phone]  Dept Fax: [phone]

## 2024-04-16 ENCOUNTER — OFFICE VISIT (OUTPATIENT)
Dept: PODIATRY | Facility: CLINIC | Age: 54
End: 2024-04-16
Payer: COMMERCIAL

## 2024-04-16 DIAGNOSIS — G60.9 IDIOPATHIC PERIPHERAL NEUROPATHY: Primary | ICD-10-CM

## 2024-04-16 DIAGNOSIS — S94.20XA: ICD-10-CM

## 2024-04-16 DIAGNOSIS — M77.9 BONE SPUR: ICD-10-CM

## 2024-04-16 PROCEDURE — 3079F DIAST BP 80-89 MM HG: CPT | Mod: CPTII,S$GLB,, | Performed by: PODIATRIST

## 2024-04-16 PROCEDURE — 1159F MED LIST DOCD IN RCRD: CPT | Mod: CPTII,S$GLB,, | Performed by: PODIATRIST

## 2024-04-16 PROCEDURE — 99999 PR PBB SHADOW E&M-EST. PATIENT-LVL III: CPT | Mod: PBBFAC,,, | Performed by: PODIATRIST

## 2024-04-16 PROCEDURE — 3075F SYST BP GE 130 - 139MM HG: CPT | Mod: CPTII,S$GLB,, | Performed by: PODIATRIST

## 2024-04-16 PROCEDURE — 99214 OFFICE O/P EST MOD 30 MIN: CPT | Mod: S$GLB,,, | Performed by: PODIATRIST

## 2024-04-16 PROCEDURE — 3008F BODY MASS INDEX DOCD: CPT | Mod: CPTII,S$GLB,, | Performed by: PODIATRIST

## 2024-04-16 RX ORDER — DICLOFENAC SODIUM 10 MG/G
2 GEL TOPICAL 4 TIMES DAILY
Qty: 100 G | Refills: 2 | Status: SHIPPED | OUTPATIENT
Start: 2024-04-16

## 2024-04-17 ENCOUNTER — TELEPHONE (OUTPATIENT)
Dept: OPTOMETRY | Facility: CLINIC | Age: 54
End: 2024-04-17
Payer: COMMERCIAL

## 2024-04-17 VITALS
SYSTOLIC BLOOD PRESSURE: 130 MMHG | WEIGHT: 218.25 LBS | HEIGHT: 64 IN | DIASTOLIC BLOOD PRESSURE: 84 MMHG | HEART RATE: 88 BPM | BODY MASS INDEX: 37.26 KG/M2

## 2024-04-24 NOTE — PROGRESS NOTES
Subjective:      Patient ID: Gisella Bennett is a 53 y.o. female.    Chief Complaint:   Follow-up (Numbness of left hallux, 2nd digit )    Gisella is a 53 y.o. female who  rtc foot exam/left foot/toe pain. Pt thi went to a gala in November and wore strappy shoes open toe.  Relates has been numb since  Feels like it is on the inside of the big toe on the side of the 2nd and it is still numb  No pain    Patient did have a fusion of her lower back L4-L5 S1 proximally 10 years ago no current back pain    Her previous foot pain is still occasional    Numbness of the 1st 2 digits left foot after wearing tight shoes.  Improving overall.    Last visit:2021  Metatarsalgia, left foot  -     X-Ray Foot Complete Left; Future     Neuritis of left foot  -     X-Ray Foot Complete Left; Future     H/O foot surgery     Other orders  -     meloxicam (MOBIC) 15 MG tablet; Take 1 tablet (15 mg total) by mouth once daily. for 20 days        I counseled the patient on her conditions, their implications and medical management.        - ordered x-ray left foot     - discussed with patient how to replace shoes of the midfoot area dispensed handout.  Possibly needs new tennis shoes.     - avoid any strappy shoes that will place pressure on the left midfoot     - recommend anti-inflammatory to reduce swelling. Mobic prescribed. Patient was instructed on dosing information. Discontinue if adverse effects occur      - follow-up in 1 month if not significantly improved we will consider ultrasound or MRI; also can consider physical therapy as possible tendinous component       FINDINGS:  Postoperative changes of 1st left metatarsal.     Plantar calcaneal spur.  No fracture or dislocation.  Lisfranc articulation is congruent.  Cartilage spaces are maintained.  Soft tissues are unremarkable.     Impression:     Postoperative change of a hallux valgus deformity with hardware, DJD and a plantar spur.       Past Medical History:   Diagnosis  Date    Acute iritis     Allergy     Atopic dermatitis     Environmental allergies 03/29/2016    Hypertension     Lower back pain 03/29/2016    Recurrent upper respiratory infection (URI)     Trigeminal neuralgia 04/14/2015    Uveitis of right eye 2003, 2018    Vitamin D deficiency disease 02/16/2015     Past Surgical History:   Procedure Laterality Date    BUNIONECTOMY Left     CHOLECYSTECTOMY      INTRAUTERINE DEVICE INSERTION  2013    MIRENA    LAPAROSCOPIC CHOLECYSTECTOMY N/A 5/13/2021    Procedure: CHOLECYSTECTOMY, LAPAROSCOPIC;  Surgeon: Deborah Agosto MD;  Location: [facility] OR [address] [place] [address];  Service: General;  Laterality: N/A;    SPINAL FUSION  04/2013    Lumbar Spinal fusion, laminectomies     Current Outpatient Medications on File Prior to Visit   Medication Sig Dispense Refill    amLODIPine (NORVASC) 5 MG tablet Take 1 tablet (5 mg total) by mouth once daily. 90 tablet 3    azelastine (ASTELIN) 137 mcg (0.1 %) nasal spray 1 spray (137 mcg total) by Nasal route 2 (two) times daily as needed for Rhinitis. 30 mL 11    difluprednate (DUREZOL) 0.05 % Drop ophthalmic solution Place into both eyes.      EPIPEN 2-CHIDI 0.3 mg/0.3 mL (1:1,000) AtIn       estradioL (ESTRACE) 0.01 % (0.1 mg/gram) vaginal cream Apply pea-sized amount nightly for two weeks then twice a week there after 42.5 g 1    fexofenadine (ALLEGRA) 180 MG tablet Take 1 tablet (180 mg total) by mouth once daily. 90 tablet 3    fluticasone propionate (FLONASE) 50 mcg/actuation nasal spray 2 sprays (100 mcg total) by Each Nostril route 2 (two) times daily. 31.6 mL 5    hydroCHLOROthiazide (MICROZIDE) 12.5 mg capsule Take 1 capsule (12.5 mg total) by mouth once daily. 90 capsule 3    ipratropium-albuteroL (COMBIVENT)  mcg/actuation inhaler Inhale 1 puff into the lungs every 4 (four) hours as needed for Wheezing. Rescue 4 g 11    montelukast (SINGULAIR) 10 mg tablet Take 1 tablet (10 mg total) by mouth once daily. 90 tablet 3    multivitamin  (THERAGRAN) per tablet Take 1 tablet by mouth once daily.      omega-3 fatty acids/fish oil (FISH OIL-OMEGA-3 FATTY ACIDS) 300-1,000 mg capsule Take by mouth once daily.      PFIZER COVID-19 MAKI VACCN,PF, 30 mcg/0.3 mL injection       triamcinolone acetonide 0.1% (KENALOG) 0.1 % cream Apply topically 2 (two) times daily. x 1-2 wks then prn flares only 80 g 2    gabapentin (NEURONTIN) 300 MG capsule Take 1 capsule (300 mg total) by mouth every evening. 30 capsule 0     No current facility-administered medications on file prior to visit.     Review of patient's allergies indicates:   Allergen Reactions    Codeine Other (See Comments)     Tremors  Other reaction(s): Not available    Nuts [tree nut] Anaphylaxis    Avocado (laurus persea) Itching, Nausea And Vomiting and Other (See Comments)       Review of Systems   Constitutional: Negative for chills, decreased appetite, fever, malaise/fatigue, night sweats, weight gain and weight loss.   Cardiovascular:  Negative for chest pain, claudication, dyspnea on exertion, leg swelling, palpitations and syncope.   Respiratory:  Negative for cough and shortness of breath.    Endocrine: Negative for cold intolerance and heat intolerance.   Hematologic/Lymphatic: Negative for bleeding problem. Does not bruise/bleed easily.   Skin:  Negative for color change, dry skin, flushing, itching, nail changes, poor wound healing, rash, skin cancer, suspicious lesions and unusual hair distribution.   Musculoskeletal:  Positive for joint swelling (Top left foot). Negative for arthritis, back pain, falls, gout, joint pain, muscle cramps, muscle weakness, myalgias, neck pain and stiffness.        Pain to the top of left foot   Gastrointestinal:  Negative for diarrhea, nausea and vomiting.   Neurological:  Negative for dizziness, focal weakness, light-headedness, numbness, paresthesias, tremors, vertigo and weakness.   Psychiatric/Behavioral:  Negative for altered mental status and depression.  "The patient does not have insomnia.    Allergic/Immunologic: Negative.            Objective:       Vitals:    24 1138   BP: 130/84   Pulse: 88   Weight: 99 kg (218 lb 4.1 oz)   Height: 5' 4" (1.626 m)   PainSc:   3   PainLoc: Toe   99 kg (218 lb 4.1 oz)     Physical Exam  Vitals reviewed.   Constitutional:       General: She is not in acute distress.     Appearance: She is well-developed. She is not ill-appearing, toxic-appearing or diaphoretic.      Comments: Proper supportive shoegear      Cardiovascular:      Pulses:           Dorsalis pedis pulses are 2+ on the right side and 2+ on the left side.        Posterior tibial pulses are 2+ on the right side and 2+ on the left side.   Musculoskeletal:         General: No tenderness.      Right lower leg: No edema.      Left lower le+ Edema (Mild dorsal left midfoot) present.      Right ankle: Normal.      Right Achilles Tendon: Normal. No tenderness or defects.      Left ankle: Normal.      Left Achilles Tendon: Normal. No tenderness or defects.      Right foot: Decreased range of motion. Deformity and bunion present.      Left foot: Decreased range of motion. Deformity present.      Comments: Mild pain with dorsiflexion against resistance left foot.  Negative neuritis elicited to dorsal left midfoot    No pain with range of motions of metatarsals or midfoot rearfoot    No foot or ankle pain with palpation   Feet:      Right foot:      Protective Sensation: 10 sites tested.  10 sites sensed.      Skin integrity: No ulcer, blister, skin breakdown, erythema, warmth, callus or dry skin.      Toenail Condition: Right toenails are normal.      Left foot:      Protective Sensation: 10 sites tested.  8 sites sensed.      Skin integrity: No ulcer, blister, skin breakdown, erythema, warmth, callus or dry skin.      Toenail Condition: Left toenails are normal.      Comments: No signs of infection or open lesion    Decrease swm 1st/2nd digits left   Skin:     General: " Skin is warm.      Capillary Refill: Capillary refill takes 2 to 3 seconds.      Coloration: Skin is not pale.      Findings: No erythema or rash.   Neurological:      Mental Status: She is alert and oriented to person, place, and time.      Sensory: No sensory deficit.      Gait: Gait is intact.   Psychiatric:         Attention and Perception: Attention normal.         Mood and Affect: Mood normal.         Speech: Speech normal.         Behavior: Behavior normal.         Thought Content: Thought content normal.         Cognition and Memory: Cognition normal.         Judgment: Judgment normal.               Assessment:       Encounter Diagnoses   Name Primary?    Idiopathic peripheral neuropathy Yes    Closed injury of deep peroneal nerve     Bone spur            Plan:       Gisella was seen today for follow-up.    Diagnoses and all orders for this visit:    Idiopathic peripheral neuropathy    Closed injury of deep peroneal nerve    Bone spur    Other orders  -     diclofenac sodium (VOLTAREN) 1 % Gel; Apply 2 g topically 4 (four) times daily.        I counseled the patient on her conditions, their implications and medical management.    Appropriate shoe gear discussed in detail.  Avoid tight-fitting shoes.  Nerve symptoms should improve over the next several months.    Discussed options for peripheral neuropathy/nerve entrapment syndrome including nerve block therapy, surgical nerve entrapment decompression procedures, and various vitamins and supplementation available shown to improve nerve function.            Follow up in about 3 months (around 7/16/2024).

## 2024-05-23 DIAGNOSIS — I10 ESSENTIAL HYPERTENSION: ICD-10-CM

## 2024-05-23 RX ORDER — AMLODIPINE BESYLATE 5 MG/1
5 TABLET ORAL DAILY
Qty: 90 TABLET | Refills: 2 | Status: SHIPPED | OUTPATIENT
Start: 2024-05-23

## 2024-05-23 RX ORDER — HYDROCHLOROTHIAZIDE 12.5 MG/1
12.5 CAPSULE ORAL DAILY
Qty: 90 CAPSULE | Refills: 0 | Status: SHIPPED | OUTPATIENT
Start: 2024-05-23

## 2024-05-23 NOTE — TELEPHONE ENCOUNTER
Refill Routing Note   Medication(s) are not appropriate for processing by [facility] for the following reason(s):        Required labs outdated    [facility] action(s):  Defer  Approve     Requires labs : Yes             Appointments  past 12m or future 3m with PCP    Date Provider   Last Visit   1/9/2024 Yudi Sales MD   Next Visit   Visit date not found Yudi Sales MD   ED visits in past 90 days: 0        Note composed:11:15 AM 05/23/2024

## 2024-05-24 ENCOUNTER — TELEPHONE (OUTPATIENT)
Dept: ALLERGY | Facility: CLINIC | Age: 54
End: 2024-05-24
Payer: COMMERCIAL

## 2024-05-24 DIAGNOSIS — H69.90 DYSFUNCTION OF EUSTACHIAN TUBE, UNSPECIFIED LATERALITY: ICD-10-CM

## 2024-05-24 RX ORDER — FLUTICASONE PROPIONATE 50 MCG
2 SPRAY, SUSPENSION (ML) NASAL 2 TIMES DAILY
Qty: 31.6 ML | Refills: 5 | Status: SHIPPED | OUTPATIENT
Start: 2024-05-24

## 2024-05-24 NOTE — TELEPHONE ENCOUNTER
LOV: 1/4/2023    Let patient know that she is due for an appointment but would send the request to you.

## 2024-05-24 NOTE — TELEPHONE ENCOUNTER
----- Message from Aviva Ibarra MA sent at 5/24/2024  9:33 AM CDT -----  Regarding: FW: refill  Contact: [phone]    ----- Message -----  From: Diana Calero  Sent: 5/24/2024   9:01 AM CDT  To: Natalia Negrete Staff  Subject: refill                                           Mrs Bennett received a message from Marga Tineo's nurse stating that she needs to make a new appt in order to get her (fluticasone propionate (FLONASE) 50 mcg/actuation nasal spray)   refilled, Patient is questioning why she needs a appt since she has been on this medication for over the past 2 years with her PCP with out having to see the . Please contact patient at   [phone]

## 2024-05-24 NOTE — TELEPHONE ENCOUNTER
Called patient to inform her that patients need to be seen yearly in order to have medications refilled. Patient did not want an appointment since on-call provider filled medication for her.

## 2024-06-04 DIAGNOSIS — J30.89 PERENNIAL ALLERGIC RHINITIS: ICD-10-CM

## 2024-06-04 RX ORDER — MONTELUKAST SODIUM 10 MG/1
10 TABLET ORAL DAILY
Qty: 90 TABLET | Refills: 3 | Status: SHIPPED | OUTPATIENT
Start: 2024-06-04

## 2024-06-04 NOTE — TELEPHONE ENCOUNTER
No care due was identified.  [facility] Embedded Care Due Messages. Reference number: [id number].   6/04/2024 7:56:04 AM CDT

## 2024-06-13 DIAGNOSIS — Z12.31 OTHER SCREENING MAMMOGRAM: ICD-10-CM

## 2024-06-29 ENCOUNTER — HOSPITAL ENCOUNTER (OUTPATIENT)
Dept: RADIOLOGY | Facility: OTHER | Age: 54
Discharge: HOME | End: 2024-06-29
Attending: INTERNAL MEDICINE
Payer: COMMERCIAL

## 2024-06-29 DIAGNOSIS — Z12.31 OTHER SCREENING MAMMOGRAM: ICD-10-CM

## 2024-06-29 PROCEDURE — 77067 SCR MAMMO BI INCL CAD: CPT | Mod: TC

## 2024-07-22 ENCOUNTER — PATIENT MESSAGE (OUTPATIENT)
Dept: PODIATRY | Facility: CLINIC | Age: 54
End: 2024-07-22
Payer: COMMERCIAL

## 2024-08-12 ENCOUNTER — PATIENT OUTREACH (OUTPATIENT)
Dept: ADMINISTRATIVE | Facility: HOSPITAL | Age: 54
End: 2024-08-12
Payer: COMMERCIAL

## 2024-08-12 DIAGNOSIS — Z00.00 ROUTINE GENERAL MEDICAL EXAMINATION AT A HEALTH CARE FACILITY: Primary | ICD-10-CM

## 2024-08-21 DIAGNOSIS — I10 ESSENTIAL HYPERTENSION: ICD-10-CM

## 2024-08-21 NOTE — TELEPHONE ENCOUNTER
Refill Routing Note   Medication(s) are not appropriate for processing by [facility] for the following reason(s):        Required labs outdated    [facility] action(s):  Defer      Medication Therapy Plan: FLOS      Appointments  past 12m or future 3m with PCP    Date Provider   Last Visit   1/9/2024 Yudi Sales MD   Next Visit   10/8/2024 Yudi Sales MD   ED visits in past 90 days: 0        Note composed:5:15 PM 08/21/2024

## 2024-08-21 NOTE — TELEPHONE ENCOUNTER
Care Due:                  Date            Visit Type   Department     Provider  --------------------------------------------------------------------------------                                MYCHART                              FOLLOWUP/OF  [facility] INTERNAL  Last Visit: 01-      FICE VISIT   MEDICINE       Yudi Sales                              EP -                              PRIMARY      [facility] INTERNAL  Next Visit: 10-      CARE ([facility])   MEDICINE       Yudi Sales                                                            Last  Test          Frequency    Reason                     Performed    Due Date  --------------------------------------------------------------------------------    CMP.........  12 months..  hydroCHLOROthiazide......  04- 04-    [facility] Embedded Care Due Messages. Reference number: [id number].   8/21/2024 5:43:55 AM CDT

## 2024-08-22 RX ORDER — HYDROCHLOROTHIAZIDE 12.5 MG/1
CAPSULE ORAL
Qty: 90 CAPSULE | Refills: 1 | Status: SHIPPED | OUTPATIENT
Start: 2024-08-22

## 2024-09-03 ENCOUNTER — OFFICE VISIT (OUTPATIENT)
Dept: INFECTIOUS DISEASES | Facility: CLINIC | Age: 54
End: 2024-09-03
Payer: COMMERCIAL

## 2024-09-03 ENCOUNTER — CLINICAL SUPPORT (OUTPATIENT)
Dept: INFECTIOUS DISEASES | Facility: CLINIC | Age: 54
End: 2024-09-03
Payer: COMMERCIAL

## 2024-09-03 VITALS
HEIGHT: 64 IN | DIASTOLIC BLOOD PRESSURE: 83 MMHG | BODY MASS INDEX: 34.03 KG/M2 | TEMPERATURE: 98 F | SYSTOLIC BLOOD PRESSURE: 126 MMHG | HEART RATE: 101 BPM | WEIGHT: 199.31 LBS

## 2024-09-03 DIAGNOSIS — Z71.84 TRAVEL ADVICE ENCOUNTER: Primary | ICD-10-CM

## 2024-09-03 PROCEDURE — 1159F MED LIST DOCD IN RCRD: CPT | Mod: CPTII,S$GLB,, | Performed by: PHYSICIAN ASSISTANT

## 2024-09-03 PROCEDURE — 90471 IMMUNIZATION ADMIN: CPT | Mod: S$GLB,,, | Performed by: INTERNAL MEDICINE

## 2024-09-03 PROCEDURE — 3074F SYST BP LT 130 MM HG: CPT | Mod: CPTII,S$GLB,, | Performed by: PHYSICIAN ASSISTANT

## 2024-09-03 PROCEDURE — 3008F BODY MASS INDEX DOCD: CPT | Mod: CPTII,S$GLB,, | Performed by: PHYSICIAN ASSISTANT

## 2024-09-03 PROCEDURE — 3079F DIAST BP 80-89 MM HG: CPT | Mod: CPTII,S$GLB,, | Performed by: PHYSICIAN ASSISTANT

## 2024-09-03 PROCEDURE — 99402 PREV MED CNSL INDIV APPRX 30: CPT | Mod: S$GLB,,, | Performed by: PHYSICIAN ASSISTANT

## 2024-09-03 PROCEDURE — 99999 PR PBB SHADOW E&M-EST. PATIENT-LVL IV: CPT | Mod: PBBFAC,,, | Performed by: PHYSICIAN ASSISTANT

## 2024-09-03 PROCEDURE — 90691 TYPHOID VACCINE IM: CPT | Mod: S$GLB,,, | Performed by: INTERNAL MEDICINE

## 2024-09-03 RX ORDER — ATOVAQUONE AND PROGUANIL HYDROCHLORIDE 250; 100 MG/1; MG/1
TABLET, FILM COATED ORAL
Qty: 14 TABLET | Refills: 0 | Status: CANCELLED | OUTPATIENT
Start: 2024-09-03

## 2024-09-03 RX ORDER — AZITHROMYCIN 500 MG/1
1000 TABLET, FILM COATED ORAL ONCE
Qty: 3 TABLET | Refills: 0 | Status: SHIPPED | OUTPATIENT
Start: 2024-09-03 | End: 2024-09-03

## 2024-09-03 RX ORDER — AZITHROMYCIN 500 MG/1
1000 TABLET, FILM COATED ORAL ONCE
Qty: 2 TABLET | Refills: 0 | Status: CANCELLED | OUTPATIENT
Start: 2024-09-03 | End: 2024-09-03

## 2024-09-03 NOTE — PROGRESS NOTES
Travel Consult  No chief complaint on file.    Gisella Bennett is here for travel consultation. Traveling to [facility] on Sept 27 x 11 days. She will not be in Malaria endemic areas.  Areas in country: urban    Accommodations: hotel  Purpose of travel: vacation  Currently ill / Fever: no  History of Splenectomy: no  The patient states that she does not live with a household member that has cancer, HIV infection, or take drugs to suppress the immune system.  Past Medical History:   Diagnosis Date    Acute iritis     Allergy     Atopic dermatitis     Environmental allergies 03/29/2016    Hypertension     Lower back pain 03/29/2016    Recurrent upper respiratory infection (URI)     Trigeminal neuralgia 04/14/2015    Uveitis of right eye 2003, 2018    Vitamin D deficiency disease 02/16/2015     Codeine, Nuts [tree nut], and Avocado (laurus persea)  Immunization History   Administered Date(s) Administered    COVID-19, MRNA, LN-S, PF (Pfizer) (Gray Cap) 08/04/2022    COVID-19, MRNA, LN-S, PF (Pfizer) (Purple Cap) 03/13/2021, 04/03/2021, 11/13/2021    COVID-19, mRNA, LNP-S, PF (Moderna 2023)Ages 12+ 11/06/2023    Influenza - Quadrivalent - PF *Preferred* (6 months and older) 11/06/2023    Tdap 12/10/2020    Zoster Recombinant 02/25/2022, 05/05/2022     ASSESSMENT: Travel  PLAN:  The Patient was provided with an extensive travel guidance packet which provides travel information specific to the patients itinerary.   The patient's medical history was reviewed and the patient was counseled on:  Dietary precautions.  Personal protective measures to prevent insect-borne diseases (e.g., malaria, dengue).  Precautions to prevent exposure to rabies and seek treatment for possible exposures.  Precautions against sun exposure.  Precautions against development of DVT during flight.  Personal and travel safety.  The patient's immunization history was reviewed and, based on the patient's itinerary, immunizations were ordered.     The patient was encouraged to contact us about any problems that may develop after immunization and possible side effects were reviewed.    The patient was instructed to purchase Imodium over the counter to take in case diarrhea (without blood or fever) develops.  An antibiotic was ordered for treatment if severe or bloody diarrhea develops and the patient was instructed on use and possible side effects.    The patient was also instructed to purchase insect repellent containing DEET or [place] and apply according to repellent label instructions.  If indicated by the patients itinerary an anti-malarial agent was prescribed for malaria prophylaxis and possible side effects were reviewed.    The patient was instructed to contact us if problems develop after travel.  Hep A (Rx given), Typhoid vaccines updated.  Rx for Azithromycin sent in.   The patient was encouraged to call the office for further concerns or complaints. Business card provided.    30 min spent on visit - 20 minutes face to face counseling and 10 min on chart review/completion.

## 2024-09-03 NOTE — ADDENDUM NOTE
Addended by: YAMINI ROBERTSON JR. on: 9/3/2024 03:48 PM     Modules accepted: Orders    
Oriented - self; Oriented - place; Oriented - time

## 2024-09-24 ENCOUNTER — PATIENT MESSAGE (OUTPATIENT)
Dept: OPTOMETRY | Facility: CLINIC | Age: 54
End: 2024-09-24

## 2024-09-24 ENCOUNTER — OFFICE VISIT (OUTPATIENT)
Dept: OPTOMETRY | Facility: CLINIC | Age: 54
End: 2024-09-24
Payer: COMMERCIAL

## 2024-09-24 DIAGNOSIS — H20.9 UVEITIS: Primary | ICD-10-CM

## 2024-09-24 DIAGNOSIS — R76.8 ANA POSITIVE: ICD-10-CM

## 2024-09-24 DIAGNOSIS — I10 ESSENTIAL HYPERTENSION: ICD-10-CM

## 2024-09-24 PROCEDURE — 99999 PR PBB SHADOW E&M-EST. PATIENT-LVL III: CPT | Mod: PBBFAC,,, | Performed by: OPTOMETRIST

## 2024-09-24 PROCEDURE — 92014 COMPRE OPH EXAM EST PT 1/>: CPT | Mod: S$GLB,,, | Performed by: OPTOMETRIST

## 2024-09-24 PROCEDURE — 1159F MED LIST DOCD IN RCRD: CPT | Mod: CPTII,S$GLB,, | Performed by: OPTOMETRIST

## 2024-09-24 NOTE — PROGRESS NOTES
HPI    DLS: 11/28/2022 - Dr. Patel  Urgent/ Uveitis     Pt states that yesterday her right eye started to have pain and was   dilated. Pt states that she has experienced any blurry vision or   photophobia. Pt states that that pain level improved today but is still at   a 5.     GTTS: Systane PRN   Last edited by Sharla Orr MA on 9/24/2024 11:36 AM.            Assessment /Plan     For exam results, see Encounter Report.    Uveitis    AMALIA positive    Essential hypertension      Start durezol QID taper    Good overall ocular health, monitor yearly    RTC 1 year, sooner PRN

## 2024-09-25 ENCOUNTER — LAB VISIT (OUTPATIENT)
Dept: LAB | Facility: HOSPITAL | Age: 54
End: 2024-09-25
Attending: INTERNAL MEDICINE
Payer: COMMERCIAL

## 2024-09-25 DIAGNOSIS — Z00.00 ROUTINE GENERAL MEDICAL EXAMINATION AT A HEALTH CARE FACILITY: ICD-10-CM

## 2024-09-25 LAB
ALBUMIN SERPL BCP-MCNC: 3.7 G/DL (ref 3.5–5.2)
ALP SERPL-CCNC: 115 U/L (ref 55–135)
ALT SERPL W/O P-5'-P-CCNC: 32 U/L (ref 10–44)
ANION GAP SERPL CALC-SCNC: 7 MMOL/L (ref 8–16)
AST SERPL-CCNC: 32 U/L (ref 10–40)
BASOPHILS # BLD AUTO: 0.06 K/UL (ref 0–0.2)
BASOPHILS NFR BLD: 1.1 % (ref 0–1.9)
BILIRUB SERPL-MCNC: 0.6 MG/DL (ref 0.1–1)
BUN SERPL-MCNC: 12 MG/DL (ref 6–20)
CALCIUM SERPL-MCNC: 9.7 MG/DL (ref 8.7–10.5)
CHLORIDE SERPL-SCNC: 107 MMOL/L (ref 95–110)
CHOLEST SERPL-MCNC: 204 MG/DL (ref 120–199)
CHOLEST/HDLC SERPL: 2.5 {RATIO} (ref 2–5)
CO2 SERPL-SCNC: 25 MMOL/L (ref 23–29)
CREAT SERPL-MCNC: 0.9 MG/DL (ref 0.5–1.4)
DIFFERENTIAL METHOD BLD: ABNORMAL
EOSINOPHIL # BLD AUTO: 0.2 K/UL (ref 0–0.5)
EOSINOPHIL NFR BLD: 3 % (ref 0–8)
ERYTHROCYTE [DISTWIDTH] IN BLOOD BY AUTOMATED COUNT: 15.3 % (ref 11.5–14.5)
EST. GFR  (NO RACE VARIABLE): >60 ML/MIN/1.73 M^2
ESTIMATED AVG GLUCOSE: 100 MG/DL (ref 68–131)
GLUCOSE SERPL-MCNC: 81 MG/DL (ref 70–110)
HBA1C MFR BLD: 5.1 % (ref 4–5.6)
HCT VFR BLD AUTO: 38.5 % (ref 37–48.5)
HDLC SERPL-MCNC: 83 MG/DL (ref 40–75)
HDLC SERPL: 40.7 % (ref 20–50)
HGB BLD-MCNC: 12 G/DL (ref 12–16)
IMM GRANULOCYTES # BLD AUTO: 0.02 K/UL (ref 0–0.04)
IMM GRANULOCYTES NFR BLD AUTO: 0.4 % (ref 0–0.5)
LDLC SERPL CALC-MCNC: 108.4 MG/DL (ref 63–159)
LYMPHOCYTES # BLD AUTO: 2.6 K/UL (ref 1–4.8)
LYMPHOCYTES NFR BLD: 46.8 % (ref 18–48)
MCH RBC QN AUTO: 26.8 PG (ref 27–31)
MCHC RBC AUTO-ENTMCNC: 31.2 G/DL (ref 32–36)
MCV RBC AUTO: 86 FL (ref 82–98)
MONOCYTES # BLD AUTO: 0.4 K/UL (ref 0.3–1)
MONOCYTES NFR BLD: 6.8 % (ref 4–15)
NEUTROPHILS # BLD AUTO: 2.3 K/UL (ref 1.8–7.7)
NEUTROPHILS NFR BLD: 41.9 % (ref 38–73)
NONHDLC SERPL-MCNC: 121 MG/DL
NRBC BLD-RTO: 0 /100 WBC
PLATELET # BLD AUTO: 364 K/UL (ref 150–450)
PMV BLD AUTO: 10.7 FL (ref 9.2–12.9)
POTASSIUM SERPL-SCNC: 4.2 MMOL/L (ref 3.5–5.1)
PROT SERPL-MCNC: 7.8 G/DL (ref 6–8.4)
RBC # BLD AUTO: 4.47 M/UL (ref 4–5.4)
SODIUM SERPL-SCNC: 139 MMOL/L (ref 136–145)
TRIGL SERPL-MCNC: 63 MG/DL (ref 30–150)
WBC # BLD AUTO: 5.58 K/UL (ref 3.9–12.7)

## 2024-09-25 PROCEDURE — 80061 LIPID PANEL: CPT | Performed by: INTERNAL MEDICINE

## 2024-09-25 PROCEDURE — 80053 COMPREHEN METABOLIC PANEL: CPT | Performed by: INTERNAL MEDICINE

## 2024-09-25 PROCEDURE — 36415 COLL VENOUS BLD VENIPUNCTURE: CPT | Mod: PN | Performed by: INTERNAL MEDICINE

## 2024-09-25 PROCEDURE — 83036 HEMOGLOBIN GLYCOSYLATED A1C: CPT | Performed by: INTERNAL MEDICINE

## 2024-09-25 PROCEDURE — 85025 COMPLETE CBC W/AUTO DIFF WBC: CPT | Performed by: INTERNAL MEDICINE

## 2024-09-25 RX ORDER — TIMOLOL MALEATE 5 MG/ML
1 SOLUTION/ DROPS OPHTHALMIC 2 TIMES DAILY
Qty: 15 ML | Refills: 1 | Status: SHIPPED | OUTPATIENT
Start: 2024-09-25 | End: 2025-09-25

## 2024-09-26 ENCOUNTER — OFFICE VISIT (OUTPATIENT)
Dept: OBSTETRICS AND GYNECOLOGY | Facility: CLINIC | Age: 54
End: 2024-09-26
Payer: COMMERCIAL

## 2024-09-26 ENCOUNTER — TELEPHONE (OUTPATIENT)
Dept: OBSTETRICS AND GYNECOLOGY | Facility: CLINIC | Age: 54
End: 2024-09-26
Payer: COMMERCIAL

## 2024-09-26 VITALS
HEIGHT: 64 IN | SYSTOLIC BLOOD PRESSURE: 120 MMHG | BODY MASS INDEX: 33.46 KG/M2 | WEIGHT: 196 LBS | DIASTOLIC BLOOD PRESSURE: 74 MMHG

## 2024-09-26 DIAGNOSIS — N89.8 VAGINAL DISCHARGE: ICD-10-CM

## 2024-09-26 DIAGNOSIS — R35.0 URINARY FREQUENCY: Primary | ICD-10-CM

## 2024-09-26 PROCEDURE — 1159F MED LIST DOCD IN RCRD: CPT | Mod: CPTII,S$GLB,,

## 2024-09-26 PROCEDURE — 99999 PR PBB SHADOW E&M-EST. PATIENT-LVL III: CPT | Mod: PBBFAC,,,

## 2024-09-26 PROCEDURE — 3044F HG A1C LEVEL LT 7.0%: CPT | Mod: CPTII,S$GLB,,

## 2024-09-26 PROCEDURE — 3074F SYST BP LT 130 MM HG: CPT | Mod: CPTII,S$GLB,,

## 2024-09-26 PROCEDURE — 99213 OFFICE O/P EST LOW 20 MIN: CPT | Mod: S$GLB,,,

## 2024-09-26 PROCEDURE — 3008F BODY MASS INDEX DOCD: CPT | Mod: CPTII,S$GLB,,

## 2024-09-26 PROCEDURE — 3078F DIAST BP <80 MM HG: CPT | Mod: CPTII,S$GLB,,

## 2024-09-26 PROCEDURE — 87086 URINE CULTURE/COLONY COUNT: CPT

## 2024-09-26 RX ORDER — AZITHROMYCIN 500 MG/1
500 TABLET, FILM COATED ORAL
COMMUNITY
Start: 2024-09-03

## 2024-09-26 RX ORDER — CLOTRIMAZOLE AND BETAMETHASONE DIPROPIONATE 10; .64 MG/G; MG/G
CREAM TOPICAL 2 TIMES DAILY
Qty: 45 G | Refills: 0 | Status: SHIPPED | OUTPATIENT
Start: 2024-09-26 | End: 2024-10-03

## 2024-09-26 RX ORDER — FLUCONAZOLE 150 MG/1
150 TABLET ORAL ONCE
Qty: 2 TABLET | Refills: 0 | Status: SHIPPED | OUTPATIENT
Start: 2024-09-26 | End: 2024-09-26

## 2024-09-26 RX ORDER — NITROFURANTOIN 25; 75 MG/1; MG/1
100 CAPSULE ORAL 2 TIMES DAILY
Qty: 14 CAPSULE | Refills: 0 | Status: SHIPPED | OUTPATIENT
Start: 2024-09-26 | End: 2024-10-03

## 2024-09-26 NOTE — PROGRESS NOTES
"  Chief Complaint:  Urinary Frequency     HPI:      Gisella Bennett is a 54 y.o.  who presents complaining of urinary frequency and urgency. Denies dysuria, hematuria.  Endorses pelvic pressure and soreness type feeling.  Had some external vaginal itching and used OTC Monistat and symptoms improved.  Denies abnormal vaginal discharge or odor. Symptoms started about a week ago.  Symptoms are stable. She denies fever, chills, flank pain, nausea, and incontinence.  Ms. Bennett is currently sexually active with a single male partner.    Pt leaving to go out of town to [place] tomorrow.     Physical Exam:      PHYSICAL EXAM:  /74   Ht 5' 4" (1.626 m)   Wt 88.9 kg (195 lb 15.8 oz)   LMP 2022   BMI 33.64 kg/m²   Body mass index is 33.64 kg/m².     APPEARANCE: Well nourished, well developed, in no acute distress.  ABDOMEN: Soft. (+) slight suprapubic tenderness.      PELVIC: Normal external genitalia without lesions.  Normal hair distribution.  Adequate perineal body, normal urethral meatus.  Vagina moist and well rugated without lesions.  Thin white discharge.  Cervix pink, without lesions, discharge or tenderness.  No significant cystocele or rectocele.  Bimanual exam shows uterus to be normal size, regular, mobile and nontender.  Adnexa without masses or tenderness.      Results:      Urine Dip: negative for all components    Assessment/Plan:     Urinary frequency  -     CULTURE, URINE    Vaginal discharge  -     Vaginosis Screen by DNA Probe; Future; Expected date: 2024    Other orders  -     clotrimazole-betamethasone 1-0.05% (LOTRISONE) cream; Apply topically 2 (two) times daily. for 7 days  Dispense: 45 g; Refill: 0  -     fluconazole (DIFLUCAN) 150 MG Tab; Take 1 tablet (150 mg total) by mouth once. Take 1 tablet (150 mg total) by mouth once daily.  If symptoms persist, take second tablet 3 days after first tablet. for 1 dose  Dispense: 2 tablet; Refill: 0  -     nitrofurantoin, " macrocrystal-monohydrate, (MACROBID) 100 MG capsule; Take 1 capsule (100 mg total) by mouth 2 (two) times daily. for 7 days  Dispense: 14 capsule; Refill: 0    Urine sent for culture. Affirm collected.  Will go ahead and treat as UTI since patient is going out of town. Rx Macrobid.    Discussed possible IC and to avoid bladder irritants.  Diflucan and Lotrisone for yeast and itching in case she develops symptoms again.  Will follow up results via portal.    Follow up PRN if no improvement or worsening in symptoms.    Use of the [facility] Patient Portal discussed and encouraged during today's visit.     Gabriella Hoffmann (Maggie), JENA  Obstetrics and Gynecology  [facility]

## 2024-09-26 NOTE — TELEPHONE ENCOUNTER
Spoke to patient regarding possible bladder infection. Pt was scheduled for appt with NP today, states she will call back another time to follow up with Dr. Mariano.

## 2024-09-26 NOTE — TELEPHONE ENCOUNTER
----- Message from Carol Iraheta sent at 9/26/2024  9:21 AM CDT -----  Type:  Needs Medical Advice     Who Called: JUANY HEATH [[id number]]    Symptoms (please be specific): Bladder Infection     How long has patient had these symptoms: 1 week     Pharmacy name and phone #:     Would the patient rather a call back or a response via [facility]? Call Back     Best Call Back Number:  [phone]    Additional Information: would like to be seen  today if possible. Pt stated that the pain is getting worse

## 2024-09-27 LAB
BACTERIA UR CULT: NORMAL
BACTERIA UR CULT: NORMAL

## 2024-10-08 ENCOUNTER — OFFICE VISIT (OUTPATIENT)
Dept: INTERNAL MEDICINE | Facility: CLINIC | Age: 54
End: 2024-10-08
Payer: COMMERCIAL

## 2024-10-08 VITALS
HEIGHT: 64 IN | HEART RATE: 91 BPM | BODY MASS INDEX: 33.38 KG/M2 | DIASTOLIC BLOOD PRESSURE: 78 MMHG | SYSTOLIC BLOOD PRESSURE: 108 MMHG | OXYGEN SATURATION: 98 % | WEIGHT: 195.56 LBS

## 2024-10-08 DIAGNOSIS — Z91.09 ENVIRONMENTAL ALLERGIES: ICD-10-CM

## 2024-10-08 DIAGNOSIS — Z00.00 ROUTINE GENERAL MEDICAL EXAMINATION AT A HEALTH CARE FACILITY: Primary | ICD-10-CM

## 2024-10-08 DIAGNOSIS — J30.2 SEASONAL ALLERGIC RHINITIS, UNSPECIFIED TRIGGER: ICD-10-CM

## 2024-10-08 DIAGNOSIS — K21.9 GASTROESOPHAGEAL REFLUX DISEASE, UNSPECIFIED WHETHER ESOPHAGITIS PRESENT: ICD-10-CM

## 2024-10-08 DIAGNOSIS — I10 ESSENTIAL HYPERTENSION: ICD-10-CM

## 2024-10-08 DIAGNOSIS — K29.70 GASTRITIS, PRESENCE OF BLEEDING UNSPECIFIED, UNSPECIFIED CHRONICITY, UNSPECIFIED GASTRITIS TYPE: ICD-10-CM

## 2024-10-08 DIAGNOSIS — E55.9 VITAMIN D DEFICIENCY DISEASE: ICD-10-CM

## 2024-10-08 PROBLEM — E66.01 SEVERE OBESITY (BMI 35.0-39.9) WITH COMORBIDITY: Status: RESOLVED | Noted: 2018-12-29 | Resolved: 2024-10-08

## 2024-10-08 PROCEDURE — 3074F SYST BP LT 130 MM HG: CPT | Mod: CPTII,S$GLB,, | Performed by: INTERNAL MEDICINE

## 2024-10-08 PROCEDURE — 99396 PREV VISIT EST AGE 40-64: CPT | Mod: S$GLB,,, | Performed by: INTERNAL MEDICINE

## 2024-10-08 PROCEDURE — 3078F DIAST BP <80 MM HG: CPT | Mod: CPTII,S$GLB,, | Performed by: INTERNAL MEDICINE

## 2024-10-08 PROCEDURE — 99213 OFFICE O/P EST LOW 20 MIN: CPT | Mod: 25,S$GLB,, | Performed by: INTERNAL MEDICINE

## 2024-10-08 PROCEDURE — 1159F MED LIST DOCD IN RCRD: CPT | Mod: CPTII,S$GLB,, | Performed by: INTERNAL MEDICINE

## 2024-10-08 PROCEDURE — 1160F RVW MEDS BY RX/DR IN RCRD: CPT | Mod: CPTII,S$GLB,, | Performed by: INTERNAL MEDICINE

## 2024-10-08 PROCEDURE — 99999 PR PBB SHADOW E&M-EST. PATIENT-LVL V: CPT | Mod: PBBFAC,,, | Performed by: INTERNAL MEDICINE

## 2024-10-08 PROCEDURE — 3008F BODY MASS INDEX DOCD: CPT | Mod: CPTII,S$GLB,, | Performed by: INTERNAL MEDICINE

## 2024-10-08 PROCEDURE — 3044F HG A1C LEVEL LT 7.0%: CPT | Mod: CPTII,S$GLB,, | Performed by: INTERNAL MEDICINE

## 2024-10-08 RX ORDER — PANTOPRAZOLE SODIUM 40 MG/1
40 TABLET, DELAYED RELEASE ORAL DAILY
Qty: 90 TABLET | Refills: 0 | Status: SHIPPED | OUTPATIENT
Start: 2024-10-08 | End: 2025-10-08

## 2024-10-08 RX ORDER — TIRZEPATIDE 5 MG/.5ML
5 INJECTION, SOLUTION SUBCUTANEOUS
COMMUNITY

## 2024-10-08 NOTE — PATIENT INSTRUCTIONS
Based on your lab results, you are most likely iron deficient. Focus on eating more IRON RICH FOODS every day -  Red meat, soybeans (edamame), lentils, raw or cooked spinach, beef, beans (lima, navy, kidney, fitch), turkey & chicken dark meat, Oysters, chickpeas (hummus), fortified cereal, pumpkin seeds, sesame seeds .     You can also consider taking iron tablets (ferrous sulfate 325mg) to take once a day (over the counter ferrous sulfate 325mg is fine too). You may need to increase your liquids or take a stool softener to avoid constipation. I would also recommend you to take vitamin C 500mg once a day as this will help with iron absorption. Don't take heartburn medication (pepcid, protonix, omeprazole, zantac etc.) within 12 hours of taking your iron pills.

## 2024-10-08 NOTE — PROGRESS NOTES
Subjective:      Patient ID: Gisella Bennett is a 54 y.o. female.    Chief Complaint: Annual Exam      Gisella Bennett is a 54 y.o. female with chronic conditions significant for HTN, seasonal allergies, Uveitis, positive AMALIA, GERD, obesity, chronic shoulder pain.   Presenting today for follow up / annual. Date of last annual is 4/6/2023    History of Present Illness      WEIGHT MANAGEMENT:  She has been using Mounjaro since January for weight loss, currently at 5 mg dosage. She has lost 30 lbs from an initial weight of 220 lbs over the past 10 months. She reports feeling good overall with occasional nausea as a side effect.    HYPERTENSION:  She is currently taking amlodipine and hydrochlorothiazide for blood pressure management. Her most recent in-office blood pressure reading was 108/78. She developed hypertension after gaining about 50 lbs when moving to her current location approximately 10 years ago. She admits to not checking her blood pressure at home recently.    ANEMIA HISTORY:  She reports a history of anemia, which has since improved. Her blood count is now within normal range, though she continues to produce slightly smaller blood cells than average. She has previously taken iron supplements for this condition.     Obesity: Discussed weight management and weight loss goals. Not interested in medical agents for weight loss management.     Mild intermittent asthma with seasonal allergies: Albuterol prn, allegra qam. Does take montelukast qhs but sometimes do not take montelukast if it is not allergy season.     VACCINATION STATUS:  She is due for flu vaccine and COVID booster, which she plans to receive in the near future.    FAMILY HISTORY:  She reports having good cholesterol genes inherited from her mother, consistently maintaining high levels of good cholesterol.    SOCIAL HISTORY:  She rarely drinks wine and does not typically consume alcohol regularly.    LAB RESULTS:  Her hemoglobin A1C  has improved from last year. Complete blood count shows slightly small blood cells with hemoglobin at 11.7, in the low normal range. Electrolytes, kidney function, and liver function tests are normal. Cholesterol panel reveals total cholesterol of 204 (slight increase from previous 198), triglycerides have slightly increased, HDL is in the 80s, and LDL is near 100.    ---------------------------  This part of the note is separate from the annual visit as this a new and separate problem(s) that was discussed during this visit. This section of the note is pertains to and is billable as part of the modifier 25.    RECENT TRAVEL AND GASTRITIS SYMPTOMS:  She recently returned from an 11-day trip to [place], visiting [place] and [facility], with an 18-hour flight back to the [place]. She reports loose stools x1, vomiting, and abdominal pain with chills after vomiting. She denies full-blown diarrhea or blood in stools. She attributes these symptoms to possible gastritis, potentially triggered by wine consumption and eating medium-rare lamb during the vacation. She has taken Imodium to manage symptoms.      ROS:  General: -fever, +chills, -fatigue, -weight gain, -weight loss  Eyes: -vision changes, -redness, -discharge  ENT: -ear pain, -nasal congestion, -sore throat  Cardiovascular: -chest pain, -palpitations, -lower extremity edema  Respiratory: -cough, -shortness of breath  Gastrointestinal: +abdominal pain, +nausea, +vomiting, -diarrhea, -constipation, -blood in stool  Genitourinary: -dysuria, -hematuria, -frequency  Musculoskeletal: -joint pain, -muscle pain  Skin: -rash, -lesion  Neurological: -headache, -dizziness, -numbness, -tingling  Psychiatric: -anxiety, -depression, -sleep difficulty              Current Outpatient Medications:     amLODIPine (NORVASC) 5 MG tablet, Take 1 tablet (5 mg total) by mouth once daily., Disp: 90 tablet, Rfl: 2    azelastine (ASTELIN) 137 mcg (0.1 %) nasal spray, 1 spray  (137 mcg total) by Nasal route 2 (two) times daily as needed for Rhinitis., Disp: 30 mL, Rfl: 11    diclofenac sodium (VOLTAREN) 1 % Gel, Apply 2 g topically 4 (four) times daily., Disp: 100 g, Rfl: 2    difluprednate (DUREZOL) 0.05 % Drop ophthalmic solution, Place into both eyes., Disp: , Rfl:     EPIPEN 2-CHIDI 0.3 mg/0.3 mL (1:1,000) AtIn, , Disp: , Rfl:     estradioL (ESTRACE) 0.01 % (0.1 mg/gram) vaginal cream, Apply pea-sized amount nightly for two weeks then twice a week there after, Disp: 42.5 g, Rfl: 1    fexofenadine (ALLEGRA) 180 MG tablet, Take 1 tablet (180 mg total) by mouth once daily., Disp: 90 tablet, Rfl: 3    fluticasone propionate (FLONASE) 50 mcg/actuation nasal spray, 2 sprays (100 mcg total) by Each Nostril route 2 (two) times daily., Disp: 31.6 mL, Rfl: 5    hydroCHLOROthiazide (MICROZIDE) 12.5 mg capsule, TAKE 1 CAPSULE(12.5 MG) BY MOUTH DAILY, Disp: 90 capsule, Rfl: 1    ipratropium-albuteroL (COMBIVENT)  mcg/actuation inhaler, Inhale 1 puff into the lungs every 4 (four) hours as needed for Wheezing. Rescue, Disp: 4 g, Rfl: 11    montelukast (SINGULAIR) 10 mg tablet, Take 1 tablet (10 mg total) by mouth once daily., Disp: 90 tablet, Rfl: 3    multivitamin (THERAGRAN) per tablet, Take 1 tablet by mouth once daily., Disp: , Rfl:     omega-3 fatty acids/fish oil (FISH OIL-OMEGA-3 FATTY ACIDS) 300-1,000 mg capsule, Take by mouth once daily., Disp: , Rfl:     timolol maleate 0.5% (TIMOPTIC) 0.5 % Drop, Place 1 drop into both eyes 2 (two) times daily., Disp: 15 mL, Rfl: 1    triamcinolone acetonide 0.1% (KENALOG) 0.1 % cream, Apply topically 2 (two) times daily. x 1-2 wks then prn flares only, Disp: 80 g, Rfl: 2    hepatitis A virus vaccine, PF, (HAVRIX, PF,) 1,440 PARMJIT unit/mL Syrg, Inject into the muscle., Disp: 1 mL, Rfl: 0    pantoprazole (PROTONIX) 40 MG tablet, Take 1 tablet (40 mg total) by mouth once daily., Disp: 90 tablet, Rfl: 0    PFIZER COVID-19 MAKI VACCN,PF, 30 mcg/0.3 mL  "injection, , Disp: , Rfl:     tirzepatide (MOUNJARO) 5 mg/0.5 mL PnIj, Inject 5 mg into the skin every 7 days. Started 1/2024, Disp: , Rfl:     Lab Results   Component Value Date    HGBA1C 5.1 09/25/2024    HGBA1C 5.4 04/06/2023    HGBA1C 5.4 12/10/2020     No results found for: "MICALBCREAT"  Lab Results   Component Value Date    LDLCALC 108.4 09/25/2024    LDLCALC 101.6 04/06/2023    CHOL 204 (H) 09/25/2024    HDL 83 (H) 09/25/2024    TRIG 63 09/25/2024       Lab Results   Component Value Date     09/25/2024    K 4.2 09/25/2024     09/25/2024    CO2 25 09/25/2024    GLU 81 09/25/2024    BUN 12 09/25/2024    CREATININE 0.9 09/25/2024    CALCIUM 9.7 09/25/2024    PROT 7.8 09/25/2024    ALBUMIN 3.7 09/25/2024    BILITOT 0.6 09/25/2024    ALKPHOS 115 09/25/2024    AST 32 09/25/2024    ALT 32 09/25/2024    ANIONGAP 7 (L) 09/25/2024    ESTGFRAFRICA >60.0 02/25/2022    EGFRNONAA >60.0 02/25/2022    WBC 5.58 09/25/2024    HGB 12.0 09/25/2024    HGB 11.7 (L) 04/06/2023    HCT 38.5 09/25/2024    MCV 86 09/25/2024     09/25/2024    TSH 0.775 04/06/2023    HEPCAB Negative 12/10/2020       Lab Results   Component Value Date    FSH 12.00 12/30/2019    UMXHIYBQ88DP >155 (H) 12/10/2020    LUKTQSZR13WB 26 (L) 06/05/2018    YLYQJQWJ21 >2000 (H) 02/25/2022    FERRITIN 66 02/25/2022    IRON 58 02/25/2022    TRANSFERRIN 297 02/25/2022    TIBC 440 02/25/2022    FESATURATED 13 (L) 02/25/2022         Past Medical History:   Diagnosis Date    Acute iritis     Allergy     Atopic dermatitis     Environmental allergies 03/29/2016    Hypertension     Lower back pain 03/29/2016    Recurrent upper respiratory infection (URI)     Trigeminal neuralgia 04/14/2015    Uveitis of right eye 2003, 2018    Vitamin D deficiency disease 02/16/2015     Past Surgical History:   Procedure Laterality Date    BUNIONECTOMY Left     CHOLECYSTECTOMY      INTRAUTERINE DEVICE INSERTION  2013    MIRENA    LAPAROSCOPIC CHOLECYSTECTOMY N/A 5/13/2021 " "   Procedure: CHOLECYSTECTOMY, LAPAROSCOPIC;  Surgeon: Deborah Agosto MD;  Location: [facility] OR [address] [place] [address];  Service: General;  Laterality: N/A;    SPINAL FUSION  04/2013    Lumbar Spinal fusion, laminectomies     Social History     Social History Narrative    , works in [employer].      Family History   Problem Relation Name Age of Onset    Hypertension Mother      Thyroid disease Mother          Goiter removed    Heart disease Father  56        congenital defect, aortic replacement    Kidney disease Father          after Cardiac study     Diabetes Mellitus Father      Diabetes Father      Eczema Brother      Breast cancer Maternal Aunt      No Known Problems Maternal Grandmother      No Known Problems Maternal Grandfather      No Known Problems Paternal Grandmother      No Known Problems Paternal Grandfather      Sarcoidosis Cousin  55        lung involvement    Eczema Other      Eczema Daughter      Asthma Neg Hx      Emphysema Neg Hx      Blindness Neg Hx      Glaucoma Neg Hx      Macular degeneration Neg Hx      Retinal detachment Neg Hx      Inflammatory bowel disease Neg Hx      Lupus Neg Hx      Psoriasis Neg Hx      Rheum arthritis Neg Hx      Colon cancer Neg Hx      Ovarian cancer Neg Hx      Heart attacks under age 50 Neg Hx       Vitals:    10/08/24 1041   BP: 108/78   Pulse: 91   SpO2: 98%   Weight: 88.7 kg (195 lb 8.8 oz)   Height: 5' 4" (1.626 m)   PainSc:   4   PainLoc: Abdomen     Objective:   Physical Exam    General: No acute distress. Well-developed. Well-nourished.  Eyes: EOMI. Sclerae anicteric.  HENT: Normocephalic. Atraumatic. Nares patent. Moist oral mucosa.  Ears: Bilateral TMs clear. Bilateral EACs clear.  Cardiovascular: Regular rate. Regular rhythm. No murmurs. No rubs. No gallops. Normal S1, S2.  Respiratory: Normal respiratory effort. Clear to auscultation bilaterally. No rales. No rhonchi. No wheezing.  Abdomen: Soft. Mild epigastric tenderness. Non-distended. " Normoactive bowel sounds.  Musculoskeletal: No  obvious deformity.  Extremities: No lower extremity edema.  Neurological: Alert & oriented x3. No slurred speech. Normal gait.  Psychiatric: Normal mood. Normal affect. Good insight. Good judgment.  Skin: Warm. Dry. No rash.        Assessment/Plan     Assessment & Plan    - Assessed recent GI symptoms as likely gastritis rather than traveler's diarrhea, based on symptom presentation and recent alcohol consumption  - Determined slightly low hemoglobin (11.7) and small RBC size may indicate mild iron deficiency  - Evaluated cholesterol levels, noting slight increase but still within optimal range  - Reviewed effectiveness of Mounjaro for weight loss, with patient losing 30 lbs since January  - Considered potential for reducing blood pressure medication due to weight loss and slightly lower blood pressure readings    GASTRITIS:  - Explained difference between gastritis and traveler's diarrhea.  - Started Protonix 40mg daily for 2 weeks for gastritis treatment; may extend to 1 month if symptoms persist.  - Contact the office if gastritis symptoms persist beyond 2 weeks of Protonix treatment.    WEIGHT MANAGEMENT:  - Ms. Bennett to continue current weight loss efforts with Mounjaro.  - Recommend increasing exercise if possible to maximize benefits of Mounjaro.  - Continued Mounjaro 5mg for weight loss.  - Provided information on the relationship between weight loss and blood pressure medication needs.    HYPERTENSION:  - Educated on the importance of monitoring blood pressure at home to track trends.  - Ms. Bennett to monitor blood pressure at home periodically to track trends.  - Continued amlodipine 5mg and hydrochlorothiazide for blood pressure management; doses may be adjusted based on future blood pressure readings.    IRON DEFICIENCY:  - Discussed iron-rich foods as an alternative to iron supplementation.  - Ms. Bennett to consider incorporating iron-rich foods  into diet.    FOLLOW UP:  - Follow up in 6 months.  - Follow up with a virtual visit if consistently experiencing low blood pressure readings (systolic around 100) or dizziness.          Routine general medical examination at a health care facility  - Bloodwork that was obtained prior to this appointment was reviewed in detail with the patient during the office visit.          Chronic conditions status updated as per HPI.  Other than changes above, cont current medications and maintain follow up with specialists.  Return to clinic in Follow up in about 6 months (around 4/8/2025).      Yudi Sales MD  [facility]    This note was generated with the assistance of ambient listening technology. Verbal consent was obtained by the patient and accompanying visitor(s) for the recording of patient appointment to facilitate this note. I attest to having reviewed and edited the generated note for accuracy, though some syntax or spelling errors may persist. Please contact the author of this note for any clarification.       Patient Instructions     Based on your lab results, you are most likely iron deficient. Focus on eating more IRON RICH FOODS every day -  Red meat, soybeans (edamame), lentils, raw or cooked spinach, beef, beans (lima, navy, kidney, fitch), turkey & chicken dark meat, Oysters, chickpeas (hummus), fortified cereal, pumpkin seeds, sesame seeds .     You can also consider taking iron tablets (ferrous sulfate 325mg) to take once a day (over the counter ferrous sulfate 325mg is fine too). You may need to increase your liquids or take a stool softener to avoid constipation. I would also recommend you to take vitamin C 500mg once a day as this will help with iron absorption. Don't take heartburn medication (pepcid, protonix, omeprazole, zantac etc.) within 12 hours of taking your iron pills.          All of your core healthy metrics are met.

## 2024-10-31 DIAGNOSIS — E66.811 OBESITY, CLASS I, BMI 30-34.9: Primary | ICD-10-CM

## 2024-10-31 DIAGNOSIS — K76.0 FATTY LIVER: ICD-10-CM

## 2024-10-31 DIAGNOSIS — I10 HYPERTENSION, UNSPECIFIED TYPE: ICD-10-CM

## 2024-10-31 RX ORDER — TIRZEPATIDE 5 MG/.5ML
5 INJECTION, SOLUTION SUBCUTANEOUS
Qty: 0.5 ML | Refills: 11 | Status: SHIPPED | OUTPATIENT
Start: 2024-10-31

## 2024-11-19 ENCOUNTER — OFFICE VISIT (OUTPATIENT)
Dept: OBSTETRICS AND GYNECOLOGY | Facility: CLINIC | Age: 54
End: 2024-11-19
Payer: COMMERCIAL

## 2024-11-19 VITALS
DIASTOLIC BLOOD PRESSURE: 82 MMHG | HEIGHT: 64 IN | WEIGHT: 195.75 LBS | BODY MASS INDEX: 33.42 KG/M2 | SYSTOLIC BLOOD PRESSURE: 110 MMHG

## 2024-11-19 DIAGNOSIS — R10.2 FEMALE PELVIC PAIN: Primary | ICD-10-CM

## 2024-11-19 PROCEDURE — 3074F SYST BP LT 130 MM HG: CPT | Mod: CPTII,S$GLB,, | Performed by: STUDENT IN AN ORGANIZED HEALTH CARE EDUCATION/TRAINING PROGRAM

## 2024-11-19 PROCEDURE — 3008F BODY MASS INDEX DOCD: CPT | Mod: CPTII,S$GLB,, | Performed by: STUDENT IN AN ORGANIZED HEALTH CARE EDUCATION/TRAINING PROGRAM

## 2024-11-19 PROCEDURE — 1159F MED LIST DOCD IN RCRD: CPT | Mod: CPTII,S$GLB,, | Performed by: STUDENT IN AN ORGANIZED HEALTH CARE EDUCATION/TRAINING PROGRAM

## 2024-11-19 PROCEDURE — 1160F RVW MEDS BY RX/DR IN RCRD: CPT | Mod: CPTII,S$GLB,, | Performed by: STUDENT IN AN ORGANIZED HEALTH CARE EDUCATION/TRAINING PROGRAM

## 2024-11-19 PROCEDURE — 99213 OFFICE O/P EST LOW 20 MIN: CPT | Mod: S$GLB,,, | Performed by: STUDENT IN AN ORGANIZED HEALTH CARE EDUCATION/TRAINING PROGRAM

## 2024-11-19 PROCEDURE — 3044F HG A1C LEVEL LT 7.0%: CPT | Mod: CPTII,S$GLB,, | Performed by: STUDENT IN AN ORGANIZED HEALTH CARE EDUCATION/TRAINING PROGRAM

## 2024-11-19 PROCEDURE — 3079F DIAST BP 80-89 MM HG: CPT | Mod: CPTII,S$GLB,, | Performed by: STUDENT IN AN ORGANIZED HEALTH CARE EDUCATION/TRAINING PROGRAM

## 2024-11-19 PROCEDURE — 99999 PR PBB SHADOW E&M-EST. PATIENT-LVL IV: CPT | Mod: PBBFAC,,, | Performed by: STUDENT IN AN ORGANIZED HEALTH CARE EDUCATION/TRAINING PROGRAM

## 2024-11-19 RX ORDER — ESTRADIOL 0.1 MG/G
CREAM VAGINAL
Qty: 42.5 G | Refills: 8 | Status: SHIPPED | OUTPATIENT
Start: 2024-11-19

## 2024-11-19 NOTE — PROGRESS NOTES
[facility]  Obstetrics & Gynecology      History of Present Illness:   Was having pelvic pain and discomfort, did urine screen and vaginal swab, present for yeast but was not having discharge or odor. Took antibiotics, 2 doses, and symptoms resolved. No more itching or burning but feels like is more vaginal dryness. No pain with intercourse, does have to use lubrication. Has estradoil cream but lost it while on vacation so hasn't used in 2 months. Needs a new prescription, that worked well.   Last period September 2023. Over the course of 6 months, she has to use a source of lubrication for intercourse versus before, she did not have to do that. She does have decrease in desire but climax is still present.   Mild honey pot vaginal soap, started using after the symptoms had already started. Otherwise, uses all natural soap.   Curious about estrogen levels as they were low at the beginning of menopause. Not on HRT now, wants to discuss today. Does not have hot flashes. Has trouble staying asleep but falls asleep easily, No sweating at night. No mood swings. No appetite changes but gained a lot weight during menopause. Lost 30 lbs the last year, monjauro, diet and exercise.    Current Outpatient Medications on File Prior to Visit   Medication Sig    amLODIPine (NORVASC) 5 MG tablet Take 1 tablet (5 mg total) by mouth once daily.    azelastine (ASTELIN) 137 mcg (0.1 %) nasal spray 1 spray (137 mcg total) by Nasal route 2 (two) times daily as needed for Rhinitis.    diclofenac sodium (VOLTAREN) 1 % Gel Apply 2 g topically 4 (four) times daily.    difluprednate (DUREZOL) 0.05 % Drop ophthalmic solution Place into both eyes.    EPIPEN 2-CHIDI 0.3 mg/0.3 mL (1:1,000) AtIn     estradioL (ESTRACE) 0.01 % (0.1 mg/gram) vaginal cream Apply pea-sized amount nightly for two weeks then twice a week there after    fexofenadine (ALLEGRA) 180 MG tablet Take 1 tablet (180 mg total) by mouth once daily.    fluticasone propionate  (FLONASE) 50 mcg/actuation nasal spray 2 sprays (100 mcg total) by Each Nostril route 2 (two) times daily.    hepatitis A virus vaccine, PF, (HAVRIX, PF,) 1,440 PARMJIT unit/mL Syrg Inject into the muscle.    hydroCHLOROthiazide (MICROZIDE) 12.5 mg capsule TAKE 1 CAPSULE(12.5 MG) BY MOUTH DAILY    ipratropium-albuteroL (COMBIVENT)  mcg/actuation inhaler Inhale 1 puff into the lungs every 4 (four) hours as needed for Wheezing. Rescue    montelukast (SINGULAIR) 10 mg tablet Take 1 tablet (10 mg total) by mouth once daily.    multivitamin (THERAGRAN) per tablet Take 1 tablet by mouth once daily.    omega-3 fatty acids/fish oil (FISH OIL-OMEGA-3 FATTY ACIDS) 300-1,000 mg capsule Take by mouth once daily.    pantoprazole (PROTONIX) 40 MG tablet Take 1 tablet (40 mg total) by mouth once daily.    PFIZER COVID-19 MAKI VACCN,PF, 30 mcg/0.3 mL injection     timolol maleate 0.5% (TIMOPTIC) 0.5 % Drop Place 1 drop into both eyes 2 (two) times daily.    tirzepatide (MOUNJARO) 5 mg/0.5 mL PnIj Inject 5 mg into the skin every 7 days. Started 2024    triamcinolone acetonide 0.1% (KENALOG) 0.1 % cream Apply topically 2 (two) times daily. x 1-2 wks then prn flares only     No current facility-administered medications on file prior to visit.       Review of patient's allergies indicates:   Allergen Reactions    Codeine Other (See Comments)     Tremors  Other reaction(s): Not available    Nuts [tree nut] Anaphylaxis    Avocado (laurus persea) Itching, Nausea And Vomiting and Other (See Comments)       Past Medical History:   Diagnosis Date    Acute iritis     Allergy     Atopic dermatitis     Environmental allergies 2016    Hypertension     Lower back pain 2016    Recurrent upper respiratory infection (URI)     Trigeminal neuralgia 2015    Uveitis of right eye , 2018    Vitamin D deficiency disease 2015     OB History    Para Term  AB Living   4 3 3 0 1 3   SAB IAB Ectopic Multiple Live  Births   0 1 0 0 0      # Outcome Date GA Lbr Stanislav/2nd Weight Sex Type Anes PTL Lv   4 Term      Vag-Spont      3 Term      Vag-Spont      2 Term      Vag-Spont      1 IAB              Past Surgical History:   Procedure Laterality Date    BUNIONECTOMY Left     CHOLECYSTECTOMY      INTRAUTERINE DEVICE INSERTION  2013    MIRENA    LAPAROSCOPIC CHOLECYSTECTOMY N/A 5/13/2021    Procedure: CHOLECYSTECTOMY, LAPAROSCOPIC;  Surgeon: Deborah Agosto MD;  Location: [facility] OR [address] [place] [address];  Service: General;  Laterality: N/A;    SPINAL FUSION  04/2013    Lumbar Spinal fusion, laminectomies     Family History       Problem Relation (Age of Onset)    Breast cancer Maternal Aunt    Diabetes Father    Diabetes Mellitus Father    Eczema Brother, Other, Daughter    Heart disease Father (56)    Hypertension Mother    Kidney disease Father    No Known Problems Maternal Grandmother, Maternal Grandfather, Paternal Grandmother, Paternal Grandfather    Sarcoidosis Cousin (55)    Thyroid disease Mother          Tobacco Use    Smoking status: Never     Passive exposure: Never    Smokeless tobacco: Never    Tobacco comments:     Philanthropy;     Substance and Sexual Activity    Alcohol use: Yes     Comment: monthly    Drug use: No     Comment: CBD cream    Sexual activity: Yes     Partners: Male     Review of Systems   Constitutional:  Negative for activity change, appetite change, fatigue and fever.   Gastrointestinal:  Negative for abdominal pain, bloating, constipation, diarrhea, nausea and vomiting.   Endocrine: Negative for hair loss and hot flashes.   Genitourinary:  Positive for vaginal dryness. Negative for bladder incontinence, dyspareunia, dysuria, flank pain, frequency, hematuria, hot flashes, pelvic pain, urgency, vaginal bleeding, vaginal discharge, vaginal pain and vaginal odor.   Integumentary:  Negative for hair changes.     Objective:     Vital Signs (Most Recent):    Vital Signs (24h Range):  [unfilled]         There is no height or weight on file to calculate BMI.  Patient's last menstrual period was 04/26/2022.    Physical Exam:   Constitutional: She is oriented to person, place, and time. She appears well-developed and well-nourished. No distress.    HENT:   Head: Normocephalic and atraumatic.    Eyes: EOM are normal.      Pulmonary/Chest: Effort normal.                  Musculoskeletal: Normal range of motion.       Neurological: She is alert and oriented to person, place, and time.    Skin: Skin is warm and dry. She is not diaphoretic.    Psychiatric: She has a normal mood and affect.         Assessment/Plan:       A full discussion of the benefit-risk ratio of hormonal replacement therapy was carried out. Improvement in vasomotor and other climacteric symptoms is discussed, including possible improvements in sleep and mood. Reduction of risk for osteoporosis was explained. We discussed the study data showing increased risk of thrombo-embolic events such as myocardial infarction, stroke and also possibly breast cancer with estrogen replacement, and how this might affect her. The range of side effects such as breast tenderness, weight gain and including possible increases in lifetime risk of breast cancer and possible thrombotic complications was discussed.   Recommend patient to be more regular with her use of estrogen topical for now and see how she does. If not improving or other postmenopausal symptoms start to rise up, we can meet again to discuss more options  Probiotics and hygiene habits/routines recommended for vaginitis  Ecream refilled    Face to Face time with patient: 20    30 minutes of total time spent on the encounter, which includes face to face time and non-face to face time preparing to see the patient (eg, review of tests), Obtaining and/or reviewing separately obtained history, Documenting clinical information in the electronic or other health record, Independently interpreting results (not  separately reported) and communicating results to the patient/family/caregiver, or Care coordination (not separately reported).      Selin Mariano MD  Obstetrics & Gynecology  [facility]

## 2024-11-19 NOTE — PATIENT INSTRUCTIONS
Make sure the probiotic contains L. Acidophilus, L. Rhamnosus, and L. Fermentum. Suggested brands include:  - Natural Factors Ultimate Probiotic, Women's Formula  - Provella  -Portillo Ultra FemFlora

## 2024-12-02 ENCOUNTER — PATIENT MESSAGE (OUTPATIENT)
Dept: OPTOMETRY | Facility: CLINIC | Age: 54
End: 2024-12-02
Payer: COMMERCIAL

## 2024-12-04 ENCOUNTER — OFFICE VISIT (OUTPATIENT)
Dept: OPTOMETRY | Facility: CLINIC | Age: 54
End: 2024-12-04
Payer: COMMERCIAL

## 2024-12-04 DIAGNOSIS — R76.8 ANA POSITIVE: Primary | ICD-10-CM

## 2024-12-04 DIAGNOSIS — H20.9 UVEITIS: ICD-10-CM

## 2024-12-04 PROCEDURE — 99999 PR PBB SHADOW E&M-EST. PATIENT-LVL III: CPT | Mod: PBBFAC,,, | Performed by: OPTOMETRIST

## 2024-12-04 PROCEDURE — 99212 OFFICE O/P EST SF 10 MIN: CPT | Mod: S$GLB,,, | Performed by: OPTOMETRIST

## 2024-12-04 NOTE — PROGRESS NOTES
LAWANDA    DAREN: 9/24/2024  Chief complaint (CC): patient here for uveitis flare up   Glasses? +  Contacts? -  H/o eye surgery, injections or laser: -  H/o eye injury: -  Known eye conditions? -  Family h/o eye conditions? -  Eye gtts?   Durazol prn OU      (-) Flashes (-)  Floaters (-) Mucous   (-)  Tearing (-) Itching (+) Burning   (-) Headaches (+) Eye Pain/discomfort (+) Irritation   (+)  Redness (-) Double vision (-) Blurry vision    Diabetic? -  A1c? Hemoglobin A1C       Date                     Value               Ref Range             Status                09/25/2024               5.1                 4.0 - 5.6 %           Final                      Last edited by Carmen Shin on 12/4/2024  9:36 AM.            Assessment /Plan     For exam results, see Encounter Report.    AMALIA positive    Uveitis      Continue with Durezol QID x 4 days then taper tid x 1 week, bid x 1 week, qd x 1 wee  Cyclogel OD 1 drop today and  3 days     RTC if condition worsens or does not resolve

## 2024-12-10 NOTE — TELEPHONE ENCOUNTER
Care Due:                  Date            Visit Type   Department     Provider  --------------------------------------------------------------------------------                                EP -                              PRIMARY      [facility] FAMILY  Last Visit: 10-      CARE ([facility])   MEDICINE       Alon Santiago                              EP -                              PRIMARY      [facility] FAMILY  Next Visit: 02-      CARE ([facility])   MEDICINE       Alon Santiago                                                            Last  Test          Frequency    Reason                     Performed    Due Date  --------------------------------------------------------------------------------    HBA1C.......  6 months...  glipiZIDE................  04-   10-    [facility] Embedded Care Due Messages. Reference number: [id number].   12/10/2024 1:21:46 PM CST   Care Due:                  Date            Visit Type   Department     Provider  --------------------------------------------------------------------------------                                MYCHART                              FOLLOWUP/OF  [facility] INTERNAL  Last Visit: 01-      FICE VISIT   MEDICINE       Yudi Sales  Next Visit: None Scheduled  None         None Found                                                            Last  Test          Frequency    Reason                     Performed    Due Date  --------------------------------------------------------------------------------    CMP.........  12 months..  hydroCHLOROthiazide......  04- 04-    [facility] Embedded Care Due Messages. Reference number: [id number].   5/23/2024 5:45:59 AM CDT

## 2025-02-03 ENCOUNTER — OFFICE VISIT (OUTPATIENT)
Dept: INTERNAL MEDICINE | Facility: CLINIC | Age: 55
End: 2025-02-03
Payer: COMMERCIAL

## 2025-02-03 DIAGNOSIS — K76.0 FATTY LIVER: ICD-10-CM

## 2025-02-03 DIAGNOSIS — E66.09 CLASS 1 OBESITY DUE TO EXCESS CALORIES WITH SERIOUS COMORBIDITY IN ADULT, UNSPECIFIED BMI: Primary | ICD-10-CM

## 2025-02-03 DIAGNOSIS — I10 ESSENTIAL HYPERTENSION: ICD-10-CM

## 2025-02-03 DIAGNOSIS — M13.0 POLYARTHRITIS: ICD-10-CM

## 2025-02-03 DIAGNOSIS — E66.811 CLASS 1 OBESITY DUE TO EXCESS CALORIES WITH SERIOUS COMORBIDITY IN ADULT, UNSPECIFIED BMI: Primary | ICD-10-CM

## 2025-02-03 DIAGNOSIS — E66.9 OBESITY (BMI 30-39.9): ICD-10-CM

## 2025-02-03 PROCEDURE — 1159F MED LIST DOCD IN RCRD: CPT | Mod: CPTII,95,, | Performed by: INTERNAL MEDICINE

## 2025-02-03 PROCEDURE — 1160F RVW MEDS BY RX/DR IN RCRD: CPT | Mod: CPTII,95,, | Performed by: INTERNAL MEDICINE

## 2025-02-03 PROCEDURE — 98006 SYNCH AUDIO-VIDEO EST MOD 30: CPT | Mod: 95,,, | Performed by: INTERNAL MEDICINE

## 2025-02-03 RX ORDER — TIRZEPATIDE 7.5 MG/.5ML
7.5 INJECTION, SOLUTION SUBCUTANEOUS
Qty: 0.5 ML | Refills: 11 | Status: SHIPPED | OUTPATIENT
Start: 2025-02-03

## 2025-02-03 NOTE — PROGRESS NOTES
The patient location is: [place]  The chief complaint leading to consultation is: Weight management    Visit type: audiovisual    Face to Face time with patient: 15  32 minutes of total time spent on the encounter, which includes face to face time and non-face to face time preparing to see the patient (eg, review of tests), Obtaining and/or reviewing separately obtained history, Documenting clinical information in the electronic or other health record, Independently interpreting results (not separately reported) and communicating results to the patient/family/caregiver, or Care coordination (not separately reported).         Each patient to whom he or she provides medical services by telemedicine is:  (1) informed of the relationship between the physician and patient and the respective role of any other health care provider with respect to management of the patient; and (2) notified that he or she may decline to receive medical services by telemedicine and may withdraw from such care at any time.    Notes:        Subjective:      Patient ID: Gisella Bennett is a 54 y.o. female.    Chief Complaint: No chief complaint on file.    Severe obesity: Continues on medication for weight loss. The patient is currently on mounjaro to help with weight management. We discussed side effects and adverse reactions. We discussed that there is an increased incidence of thyroid cancer and pancreatitis with continued use of this medication.  Discussed that if coverage is denied, we will not be pursing this route further as there is a nation-wide shortage of these medications and these medications are typically not covered for non-diabetics. The patient has lost 1 lb since the last visit. Discussed importance of staying well hydrated and also constipation management. Would like to change medication to zepbound for insurance coverage issues. RTC in 3 months.      Joint pain: Complains of polyarticular joint pain. Has been having all  over body flare up, intermittently. Improves with NSAID.     Denies any chest pain, shortness of breath, nausea vomiting constipation diarrhea, blood in stool, heartburn    Review of Systems   HENT:  Negative for hearing loss.    Eyes:  Negative for discharge.   Respiratory:  Negative for wheezing.    Cardiovascular:  Negative for chest pain and palpitations.   Gastrointestinal:  Positive for constipation. Negative for blood in stool, diarrhea and vomiting.   Genitourinary:  Negative for dysuria and hematuria.   Musculoskeletal:  Negative for neck pain.   Neurological:  Negative for weakness and headaches.   Endo/Heme/Allergies:  Negative for polydipsia.         Current Outpatient Medications:     amLODIPine (NORVASC) 5 MG tablet, Take 1 tablet (5 mg total) by mouth once daily., Disp: 90 tablet, Rfl: 2    azelastine (ASTELIN) 137 mcg (0.1 %) nasal spray, 1 spray (137 mcg total) by Nasal route 2 (two) times daily as needed for Rhinitis., Disp: 30 mL, Rfl: 11    diclofenac sodium (VOLTAREN) 1 % Gel, Apply 2 g topically 4 (four) times daily., Disp: 100 g, Rfl: 2    difluprednate (DUREZOL) 0.05 % Drop ophthalmic solution, Place into both eyes., Disp: , Rfl:     EPIPEN 2-CHIDI 0.3 mg/0.3 mL (1:1,000) AtIn, , Disp: , Rfl:     estradioL (ESTRACE) 0.01 % (0.1 mg/gram) vaginal cream, Apply pea-sized amount nightly for two weeks then twice a week there after, Disp: 42.5 g, Rfl: 8    fexofenadine (ALLEGRA) 180 MG tablet, Take 1 tablet (180 mg total) by mouth once daily., Disp: 90 tablet, Rfl: 3    fluticasone propionate (FLONASE) 50 mcg/actuation nasal spray, 2 sprays (100 mcg total) by Each Nostril route 2 (two) times daily., Disp: 31.6 mL, Rfl: 5    hepatitis A virus vaccine, PF, (HAVRIX, PF,) 1,440 PARMJIT unit/mL Syrg, Inject into the muscle., Disp: 1 mL, Rfl: 0    hydroCHLOROthiazide (MICROZIDE) 12.5 mg capsule, TAKE 1 CAPSULE(12.5 MG) BY MOUTH DAILY, Disp: 90 capsule, Rfl: 1    ipratropium-albuteroL (COMBIVENT)   "mcg/actuation inhaler, Inhale 1 puff into the lungs every 4 (four) hours as needed for Wheezing. Rescue, Disp: 4 g, Rfl: 11    montelukast (SINGULAIR) 10 mg tablet, Take 1 tablet (10 mg total) by mouth once daily., Disp: 90 tablet, Rfl: 3    multivitamin (THERAGRAN) per tablet, Take 1 tablet by mouth once daily., Disp: , Rfl:     omega-3 fatty acids/fish oil (FISH OIL-OMEGA-3 FATTY ACIDS) 300-1,000 mg capsule, Take by mouth once daily., Disp: , Rfl:     pantoprazole (PROTONIX) 40 MG tablet, Take 1 tablet (40 mg total) by mouth once daily., Disp: 90 tablet, Rfl: 0    PFIZER COVID-19 MAKI VACCN,PF, 30 mcg/0.3 mL injection, , Disp: , Rfl:     timolol maleate 0.5% (TIMOPTIC) 0.5 % Drop, Place 1 drop into both eyes 2 (two) times daily., Disp: 15 mL, Rfl: 1    tirzepatide, weight loss, (ZEPBOUND) 7.5 mg/0.5 mL PnIj, Inject 7.5 mg into the skin every 7 days., Disp: 0.5 mL, Rfl: 11    triamcinolone acetonide 0.1% (KENALOG) 0.1 % cream, Apply topically 2 (two) times daily. x 1-2 wks then prn flares only, Disp: 80 g, Rfl: 2    Lab Results   Component Value Date    HGBA1C 5.1 09/25/2024    HGBA1C 5.4 04/06/2023    HGBA1C 5.4 12/10/2020     No results found for: "MICALBCREAT"  Lab Results   Component Value Date    LDLCALC 108.4 09/25/2024    LDLCALC 101.6 04/06/2023    CHOL 204 (H) 09/25/2024    HDL 83 (H) 09/25/2024    TRIG 63 09/25/2024       Lab Results   Component Value Date     09/25/2024    K 4.2 09/25/2024     09/25/2024    CO2 25 09/25/2024    GLU 81 09/25/2024    BUN 12 09/25/2024    CREATININE 0.9 09/25/2024    CALCIUM 9.7 09/25/2024    PROT 7.8 09/25/2024    ALBUMIN 3.7 09/25/2024    BILITOT 0.6 09/25/2024    ALKPHOS 115 09/25/2024    AST 32 09/25/2024    ALT 32 09/25/2024    ANIONGAP 7 (L) 09/25/2024    ESTGFRAFRICA >60.0 02/25/2022    EGFRNONAA >60.0 02/25/2022    WBC 5.58 09/25/2024    HGB 12.0 09/25/2024    HGB 11.7 (L) 04/06/2023    HCT 38.5 09/25/2024    MCV 86 09/25/2024     09/25/2024    " TSH 0.775 04/06/2023    HEPCAB Negative 12/10/2020       Lab Results   Component Value Date    FSH 12.00 12/30/2019    EEJRKLXN16PA >155 (H) 12/10/2020    KULFNRWE50IB 26 (L) 06/05/2018    AVDVDVBN87 >2000 (H) 02/25/2022    FERRITIN 66 02/25/2022    IRON 58 02/25/2022    TRANSFERRIN 297 02/25/2022    TIBC 440 02/25/2022    FESATURATED 13 (L) 02/25/2022         Past Medical History:   Diagnosis Date    Acute iritis     Allergy     Atopic dermatitis     Environmental allergies 03/29/2016    Hypertension     Lower back pain 03/29/2016    Recurrent upper respiratory infection (URI)     Trigeminal neuralgia 04/14/2015    Uveitis of right eye 2003, 2018    Vitamin D deficiency disease 02/16/2015     Past Surgical History:   Procedure Laterality Date    BUNIONECTOMY Left     CHOLECYSTECTOMY      INTRAUTERINE DEVICE INSERTION  2013    MIRENA    LAPAROSCOPIC CHOLECYSTECTOMY N/A 5/13/2021    Procedure: CHOLECYSTECTOMY, LAPAROSCOPIC;  Surgeon: Deborah Agosto MD;  Location: [facility] OR [address] [place] [address];  Service: General;  Laterality: N/A;    SPINAL FUSION  04/2013    Lumbar Spinal fusion, laminectomies     Social History     Social History Narrative    , works in [employer].      Family History   Problem Relation Name Age of Onset    Hypertension Mother      Thyroid disease Mother          Goiter removed    Heart disease Father  56        congenital defect, aortic replacement    Kidney disease Father          after Cardiac study     Diabetes Mellitus Father      Diabetes Father      Eczema Brother      Breast cancer Maternal Aunt      No Known Problems Maternal Grandmother      No Known Problems Maternal Grandfather      No Known Problems Paternal Grandmother      No Known Problems Paternal Grandfather      Sarcoidosis Cousin  55        lung involvement    Eczema Other      Eczema Daughter      Asthma Neg Hx      Emphysema Neg Hx      Blindness Neg Hx      Glaucoma Neg Hx      Macular degeneration Neg Hx      Retinal  detachment Neg Hx      Inflammatory bowel disease Neg Hx      Lupus Neg Hx      Psoriasis Neg Hx      Rheum arthritis Neg Hx      Colon cancer Neg Hx      Ovarian cancer Neg Hx      Heart attacks under age 50 Neg Hx       There were no vitals filed for this visit.  Objective:   Physical Exam  Constitutional:       Appearance: Normal appearance.   HENT:      Head: Normocephalic.      Nose: Nose normal.   Neurological:      Mental Status: She is alert and oriented to person, place, and time. Mental status is at baseline.   Psychiatric:         Mood and Affect: Mood normal.         Behavior: Behavior normal.       Assessment:     1. Class 1 obesity due to excess calories with serious comorbidity in adult, unspecified BMI    2. Essential hypertension    3. Obesity (BMI 30-39.9)    4. Fatty liver    5. Polyarthritis      Plan:     Orders Placed This Encounter    AMALIA    tirzepatide, weight loss, (ZEPBOUND) 7.5 mg/0.5 mL PnIj   Continue weight loss medication at the prescribed dose    There are no Patient Instructions on file for this visit.  All of your core healthy metrics are met.  Answers submitted by the patient for this visit:  Review of Systems Questionnaire (Submitted on 2/2/2025)  activity change: Yes  unexpected weight change: No  rhinorrhea: No  trouble swallowing: No  visual disturbance: No  chest tightness: No  polyuria: No  difficulty urinating: No  menstrual problem: No  joint swelling: No  arthralgias: No  confusion: No  dysphoric mood: No

## 2025-02-06 ENCOUNTER — PATIENT MESSAGE (OUTPATIENT)
Dept: OPTOMETRY | Facility: CLINIC | Age: 55
End: 2025-02-06
Payer: COMMERCIAL

## 2025-02-07 RX ORDER — DIFLUPREDNATE OPHTHALMIC 0.5 MG/ML
1 EMULSION OPHTHALMIC 4 TIMES DAILY
Qty: 5 ML | Refills: 1 | Status: SHIPPED | OUTPATIENT
Start: 2025-02-07 | End: 2025-02-14

## 2025-02-28 ENCOUNTER — PATIENT MESSAGE (OUTPATIENT)
Dept: OPTOMETRY | Facility: CLINIC | Age: 55
End: 2025-02-28
Payer: COMMERCIAL

## 2025-03-06 ENCOUNTER — OFFICE VISIT (OUTPATIENT)
Dept: OPTOMETRY | Facility: CLINIC | Age: 55
End: 2025-03-06
Payer: COMMERCIAL

## 2025-03-06 DIAGNOSIS — H20.9 UVEITIS: Primary | ICD-10-CM

## 2025-03-06 DIAGNOSIS — H04.123 DRY EYE SYNDROME, BILATERAL: ICD-10-CM

## 2025-03-06 DIAGNOSIS — H16.103 SUPERFICIAL KERATITIS OF BOTH EYES: ICD-10-CM

## 2025-03-06 DIAGNOSIS — R76.8 ANA POSITIVE: ICD-10-CM

## 2025-03-06 PROCEDURE — 99999 PR PBB SHADOW E&M-EST. PATIENT-LVL I: CPT | Mod: PBBFAC,,, | Performed by: OPTOMETRIST

## 2025-03-06 PROCEDURE — 99212 OFFICE O/P EST SF 10 MIN: CPT | Mod: S$GLB,,, | Performed by: OPTOMETRIST

## 2025-03-06 RX ORDER — CYCLOSPORINE OPHTHALMIC SOLUTION 1 MG/ML
1 SOLUTION/ DROPS OPHTHALMIC 2 TIMES DAILY
Qty: 2 ML | Refills: 5 | Status: SHIPPED | OUTPATIENT
Start: 2025-03-06 | End: 2026-03-06

## 2025-03-06 NOTE — PROGRESS NOTES
HPI    DLS: 12/4/2024 - Dr. Patel    Pt states that her OD vision has been blurry. Pt states that symptoms   started about 3 weeks ago. Pt started steroid taper and is done to using   the drops QD. Pt denies pain, redness, and irritation.    GTTS:  Durezol QD - last dose was yesterday.   Last edited by Sharla Orr MA on 3/6/2025 10:16 AM.            Assessment /Plan     For exam results, see Encounter Report.    Uveitis  AMALIA positive   Continue with Durezol taper    Dry eye syndrome, bilateral   Decreased TBUT/ NaFL stain (+) PEEs  Superficial keratitis of both eyes  Start     cycloSPORINE (VEVYE) 0.1 % Drop; Place 1 drop into both eyes 2 (two) times a day.  Dispense: 2 mL; Refill: 5  Start IVIZIA BID OU  Start systane PM ointment QHS      RTC 3 weeks for dry eye f/u

## 2025-03-10 DIAGNOSIS — Z23 NEED FOR HEPATITIS A IMMUNIZATION: Primary | ICD-10-CM

## 2025-03-11 ENCOUNTER — CLINICAL SUPPORT (OUTPATIENT)
Dept: INFECTIOUS DISEASES | Facility: CLINIC | Age: 55
End: 2025-03-11
Payer: COMMERCIAL

## 2025-03-11 DIAGNOSIS — Z23 NEED FOR HEPATITIS A IMMUNIZATION: ICD-10-CM

## 2025-03-11 PROCEDURE — 90632 HEPA VACCINE ADULT IM: CPT | Mod: S$GLB,,, | Performed by: INTERNAL MEDICINE

## 2025-03-11 PROCEDURE — 99999 PR PBB SHADOW E&M-EST. PATIENT-LVL I: CPT | Mod: PBBFAC,,,

## 2025-03-11 PROCEDURE — 90471 IMMUNIZATION ADMIN: CPT | Mod: S$GLB,,, | Performed by: INTERNAL MEDICINE

## 2025-03-11 NOTE — PROGRESS NOTES
Pt received Havrix IM to left deltoid, pt tolerated injection well and departed from clinic in [facility].

## 2025-03-18 ENCOUNTER — PATIENT MESSAGE (OUTPATIENT)
Dept: OPTOMETRY | Facility: CLINIC | Age: 55
End: 2025-03-18
Payer: COMMERCIAL

## 2025-03-28 ENCOUNTER — OFFICE VISIT (OUTPATIENT)
Dept: OPTOMETRY | Facility: CLINIC | Age: 55
End: 2025-03-28
Payer: COMMERCIAL

## 2025-03-28 DIAGNOSIS — H16.103 KERATITIS, SUPERFICIAL, BILATERAL: ICD-10-CM

## 2025-03-28 DIAGNOSIS — H04.123 DRY EYE SYNDROME, BILATERAL: Primary | ICD-10-CM

## 2025-03-28 PROCEDURE — 99999 PR PBB SHADOW E&M-EST. PATIENT-LVL III: CPT | Mod: PBBFAC,,, | Performed by: OPTOMETRIST

## 2025-03-28 NOTE — PROGRESS NOTES
HPI    DLS: 3/6/2025 - Dr. Patel  Uveitis/ Dry eye fu     Pt states the her eyes are slowly improving but her eyes have been burning   and itching recently. Pt denies eye pain.    GTTS:  Vevye BID  Ivizia QD  Systane PM reba PRN  Last edited by Sharla Orr MA on 3/28/2025  8:27 AM.            Assessment /Plan     For exam results, see Encounter Report.    Dry eye syndrome, bilateral               Decreased TBUT/ NaFL stain (+) PEEs  Superficial keratitis of both eyes  Start     cycloSPORINE (VEVYE) 0.1 % Drop; Place 1 drop into both eyes 2 (two) times a day.  Dispense: 2 mL; Refill: 5  Start IVIZIA BID OU  Start systane PM ointment QHS        Improving, since last visit  Continue with drops     RTC Sept 2025 for annual/ DFE

## 2025-04-24 ENCOUNTER — OFFICE VISIT (OUTPATIENT)
Dept: INTERNAL MEDICINE | Facility: CLINIC | Age: 55
End: 2025-04-24
Payer: COMMERCIAL

## 2025-04-24 VITALS
HEART RATE: 83 BPM | DIASTOLIC BLOOD PRESSURE: 74 MMHG | WEIGHT: 197.31 LBS | HEIGHT: 64 IN | BODY MASS INDEX: 33.69 KG/M2 | OXYGEN SATURATION: 99 % | SYSTOLIC BLOOD PRESSURE: 114 MMHG

## 2025-04-24 DIAGNOSIS — Z12.31 OTHER SCREENING MAMMOGRAM: ICD-10-CM

## 2025-04-24 DIAGNOSIS — K76.0 FATTY LIVER: ICD-10-CM

## 2025-04-24 DIAGNOSIS — I10 ESSENTIAL HYPERTENSION: ICD-10-CM

## 2025-04-24 DIAGNOSIS — H69.90 DYSFUNCTION OF EUSTACHIAN TUBE, UNSPECIFIED LATERALITY: ICD-10-CM

## 2025-04-24 DIAGNOSIS — Z12.11 COLON CANCER SCREENING: Primary | ICD-10-CM

## 2025-04-24 PROCEDURE — 99214 OFFICE O/P EST MOD 30 MIN: CPT | Mod: S$GLB,,, | Performed by: INTERNAL MEDICINE

## 2025-04-24 PROCEDURE — 3074F SYST BP LT 130 MM HG: CPT | Mod: CPTII,S$GLB,, | Performed by: INTERNAL MEDICINE

## 2025-04-24 PROCEDURE — G2211 COMPLEX E/M VISIT ADD ON: HCPCS | Mod: S$GLB,,, | Performed by: INTERNAL MEDICINE

## 2025-04-24 PROCEDURE — 1160F RVW MEDS BY RX/DR IN RCRD: CPT | Mod: CPTII,S$GLB,, | Performed by: INTERNAL MEDICINE

## 2025-04-24 PROCEDURE — 3008F BODY MASS INDEX DOCD: CPT | Mod: CPTII,S$GLB,, | Performed by: INTERNAL MEDICINE

## 2025-04-24 PROCEDURE — 1159F MED LIST DOCD IN RCRD: CPT | Mod: CPTII,S$GLB,, | Performed by: INTERNAL MEDICINE

## 2025-04-24 PROCEDURE — 99999 PR PBB SHADOW E&M-EST. PATIENT-LVL V: CPT | Mod: PBBFAC,,, | Performed by: INTERNAL MEDICINE

## 2025-04-24 PROCEDURE — 3078F DIAST BP <80 MM HG: CPT | Mod: CPTII,S$GLB,, | Performed by: INTERNAL MEDICINE

## 2025-04-24 RX ORDER — FLUTICASONE PROPIONATE 50 MCG
2 SPRAY, SUSPENSION (ML) NASAL 2 TIMES DAILY
Qty: 31.6 ML | Refills: 11 | Status: SHIPPED | OUTPATIENT
Start: 2025-04-24

## 2025-04-24 NOTE — PROGRESS NOTES
"Subjective:      Patient ID: Gisella Bennett is a 54 y.o. female.    Chief Complaint: Follow-up (6 mo /)      Gisella Bennett is a 54 y.o. female with chronic conditions significant for HTN, seasonal allergies, Uveitis, positive AMALIA, GERD, obesity, chronic shoulder pain.   Presenting today for follow up / annual. Date of last annual is 10/8/2024     The patient consents verbally to being recorded for [facility] service today.     History of Present Illness    CHIEF COMPLAINT:  Ms. Bennett presents today for follow up    MEDICATIONS:  She continues terzepatide 7.5mg through compounding pharmacy after insurance denial of Zepbound. She reports maintaining stable weight despite increased stress and frequent travel.    ALLERGIES:  She reports ongoing management challenges with Flonase prescription. She expresses concerns about current allergist's requirement for monthly prescription refills despite the medication being available OTC.    FAMILY HISTORY:  Mother has dementia and is 80 years old.    Visit today is associated with current or anticipated ongoing medical care related to this patient's single serious condition/complex condition of HTN, seasonal allergies, Uveitis, positive AMALIA, GERD, obesity, chronic shoulder pain. . The patient will return to see me as these issues will be followed longitudinally.    ROS:  Allergic: +seasonal allergies          Current Medications[1]    Lab Results   Component Value Date    HGBA1C 5.1 09/25/2024    HGBA1C 5.4 04/06/2023    HGBA1C 5.4 12/10/2020     No results found for: "MICALBCREAT"  Lab Results   Component Value Date    LDLCALC 108.4 09/25/2024    LDLCALC 101.6 04/06/2023    CHOL 204 (H) 09/25/2024    HDL 83 (H) 09/25/2024    TRIG 63 09/25/2024       Lab Results   Component Value Date     09/25/2024    K 4.2 09/25/2024     09/25/2024    CO2 25 09/25/2024    GLU 81 09/25/2024    BUN 12 09/25/2024    CREATININE 0.9 09/25/2024    CALCIUM 9.7 " 09/25/2024    PROT 7.8 09/25/2024    ALBUMIN 3.7 09/25/2024    BILITOT 0.6 09/25/2024    ALKPHOS 115 09/25/2024    AST 32 09/25/2024    ALT 32 09/25/2024    ANIONGAP 7 (L) 09/25/2024    ESTGFRAFRICA >60.0 02/25/2022    EGFRNONAA >60.0 02/25/2022    WBC 5.58 09/25/2024    HGB 12.0 09/25/2024    HGB 11.7 (L) 04/06/2023    HCT 38.5 09/25/2024    MCV 86 09/25/2024     09/25/2024    TSH 0.775 04/06/2023    HEPCAB Negative 12/10/2020       Lab Results   Component Value Date    FSH 12.00 12/30/2019    JHICGKYF68ZW >155 (H) 12/10/2020    [id number] 26 (L) 06/05/2018    XQAPLGJI55 >2000 (H) 02/25/2022    FERRITIN 66 02/25/2022    IRON 58 02/25/2022    TRANSFERRIN 297 02/25/2022    TIBC 440 02/25/2022    FESATURATED 13 (L) 02/25/2022         Past Medical History:   Diagnosis Date    Acute iritis     Allergy     Atopic dermatitis     Environmental allergies 03/29/2016    Hypertension     Lower back pain 03/29/2016    Recurrent upper respiratory infection (URI)     Trigeminal neuralgia 04/14/2015    Uveitis of right eye 2003, 2018    Vitamin D deficiency disease 02/16/2015     Past Surgical History:   Procedure Laterality Date    BUNIONECTOMY Left     CHOLECYSTECTOMY      INTRAUTERINE DEVICE INSERTION  2013    MIRENA    LAPAROSCOPIC CHOLECYSTECTOMY N/A 5/13/2021    Procedure: CHOLECYSTECTOMY, LAPAROSCOPIC;  Surgeon: Deborah Agosto MD;  Location: [facility] OR [address] [place] [address];  Service: General;  Laterality: N/A;    SPINAL FUSION  04/2013    Lumbar Spinal fusion, laminectomies     Social History     Social History Narrative    , works in [employer].      Family History   Problem Relation Name Age of Onset    Hypertension Mother      Thyroid disease Mother          Goiter removed    Heart disease Father  56        congenital defect, aortic replacement    Kidney disease Father          after Cardiac study     Diabetes Mellitus Father      Diabetes Father      Eczema Brother      Breast cancer Maternal Aunt      No  "Known Problems Maternal Grandmother      No Known Problems Maternal Grandfather      No Known Problems Paternal Grandmother      No Known Problems Paternal Grandfather      Sarcoidosis Cousin  55        lung involvement    Eczema Other      Eczema Daughter      Asthma Neg Hx      Emphysema Neg Hx      Blindness Neg Hx      Glaucoma Neg Hx      Macular degeneration Neg Hx      Retinal detachment Neg Hx      Inflammatory bowel disease Neg Hx      Lupus Neg Hx      Psoriasis Neg Hx      Rheum arthritis Neg Hx      Colon cancer Neg Hx      Ovarian cancer Neg Hx      Heart attacks under age 50 Neg Hx           Vitals:    04/24/25 0916   BP: 114/74   Pulse: 83   SpO2: 99%   Weight: 89.5 kg (197 lb 5 oz)   Height: 5' 4" (1.626 m)   PainSc: 0-No pain     Objective:      Physical Exam  Vitals reviewed.   Constitutional:       Appearance: Normal appearance.   HENT:      Head: Normocephalic.      Right Ear: Tympanic membrane, ear canal and external ear normal.      Left Ear: Tympanic membrane, ear canal and external ear normal.      Nose: Nose normal.      Mouth/Throat:      Mouth: Mucous membranes are moist.      Pharynx: Oropharynx is clear.   Eyes:      Conjunctiva/sclera: Conjunctivae normal.      Pupils: Pupils are equal, round, and reactive to light.   Cardiovascular:      Rate and Rhythm: Normal rate and regular rhythm.      Pulses: Normal pulses.   Pulmonary:      Effort: Pulmonary effort is normal.      Breath sounds: Normal breath sounds.   Abdominal:      General: Abdomen is flat. Bowel sounds are normal.      Palpations: Abdomen is soft.   Musculoskeletal:      Cervical back: Neck supple.   Skin:     General: Skin is warm.   Neurological:      General: No focal deficit present.      Mental Status: She is alert.   Psychiatric:         Mood and Affect: Mood normal.         Assessment/Plan     Assessment & Plan    - Considered increasing terzepatide dosage from current 7.5 mg to a higher dose (10 mg, 12 mg, or " potentially 15 mg) to promote further weight loss.  - BP well-controlled at 115/74, within desired range of 110-120 systolic.    PLAN SUMMARY:  - Prescribed Flonase with 10 refills for continued allergy management  - Ms. Bennett to drink a glass of water before meals to promote satiety  - Advised on proper usage and potential side effects of Flonase  - Educated on stress impact on BP readings and proper measurement techniques  - Follow-up if allergy symptoms worsen or adverse reactions to medication occur    FAMILY HISTORY OF DEMENTIA:  - Noted that mother has dementia and recently turned 80 years old.  - Acknowledged the challenges of caring for a family member with dementia and discussed the adjustment period.  - Advised the patient to monitor their own stress levels and seek support if needed.    ALLERGY MANAGEMENT:  - Reviewed the patient's allergy medication history and ongoing need for Flonase.  - Confirmed that the patient has been using Flonase effectively for allergy management.  - Prescribed Flonase with 10 refills to ensure continued treatment.  - Instructed the patient on proper usage and potential side effects of Flonase.    BLOOD PRESSURE MANAGEMENT:  - Discussed impact of stress, anger, and body position on BP readings.  - Ms. Bennett to avoid measuring BP immediately after stressful situations or while talking.            Colon cancer screening  -     Cologuard Screening (Multitarget Stool DNA); Future; Expected date: 04/24/2025    Other screening mammogram  -     Mammo Digital Screening Bilat w/ Brennan (XPD); Future; Expected date: 06/30/2025    Dysfunction of Eustachian tube, unspecified laterality  -     fluticasone propionate (FLONASE) 50 mcg/actuation nasal spray; 2 sprays (100 mcg total) by Each Nostril route 2 (two) times daily.  Dispense: 31.6 mL; Refill: 11    Essential hypertension    Fatty liver        Chronic conditions status updated as per HPI.  Other than changes above, cont current  medications and maintain follow up with specialists.  Return to clinic in Follow up in about 6 months (around 10/24/2025).      Yudi Sales MD  [facility]    Total time spent on this encounter: 32 minutes. This includes face to face time and non-face to face time preparing to see the patient (eg, review of tests), obtaining and/or reviewing separately obtained history, documenting clinical information in the electronic or other health record, independently interpreting results and communicating results to the patient/family/caregiver, or care coordinator.    This note was generated with the assistance of ambient listening technology. Verbal consent was obtained by the patient and accompanying visitor(s) for the recording of patient appointment to facilitate this note. I attest to having reviewed and edited the generated note for accuracy, though some syntax or spelling errors may persist. Please contact the author of this note for any clarification.       There are no Patient Instructions on file for this visit.  Tests to Keep You Healthy    Mammogram: Met on 6/29/2024  Colon Cancer Screening: ORDERED  Cervical Cancer Screening: Met on 12/19/2023  Last Blood Pressure <= 139/89 (4/24/2025): Yes                               [1]   Current Outpatient Medications:     amLODIPine (NORVASC) 5 MG tablet, TAKE 1 TABLET(5 MG) BY MOUTH DAILY, Disp: 90 tablet, Rfl: 3    azelastine (ASTELIN) 137 mcg (0.1 %) nasal spray, 1 spray (137 mcg total) by Nasal route 2 (two) times daily as needed for Rhinitis., Disp: 30 mL, Rfl: 11    cycloSPORINE (VEVYE) 0.1 % Drop, Place 1 drop into both eyes 2 (two) times a day., Disp: 2 mL, Rfl: 5    diclofenac sodium (VOLTAREN) 1 % Gel, Apply 2 g topically 4 (four) times daily., Disp: 100 g, Rfl: 2    EPIPEN 2-CHIDI 0.3 mg/0.3 mL (1:1,000) AtIn, , Disp: , Rfl:     estradioL (ESTRACE) 0.01 % (0.1 mg/gram) vaginal cream, Apply pea-sized amount nightly for two weeks then twice a week there  after, Disp: 42.5 g, Rfl: 8    fexofenadine (ALLEGRA) 180 MG tablet, Take 1 tablet (180 mg total) by mouth once daily., Disp: 90 tablet, Rfl: 3    hydroCHLOROthiazide (MICROZIDE) 12.5 mg capsule, TAKE 1 CAPSULE(12.5 MG) BY MOUTH DAILY, Disp: 90 capsule, Rfl: 2    ipratropium-albuteroL (COMBIVENT)  mcg/actuation inhaler, Inhale 1 puff into the lungs every 4 (four) hours as needed for Wheezing. Rescue, Disp: 4 g, Rfl: 11    montelukast (SINGULAIR) 10 mg tablet, Take 1 tablet (10 mg total) by mouth once daily., Disp: 90 tablet, Rfl: 3    multivitamin (THERAGRAN) per tablet, Take 1 tablet by mouth once daily., Disp: , Rfl:     omega-3 fatty acids/fish oil (FISH OIL-OMEGA-3 FATTY ACIDS) 300-1,000 mg capsule, Take by mouth once daily., Disp: , Rfl:     pantoprazole (PROTONIX) 40 MG tablet, Take 1 tablet (40 mg total) by mouth once daily., Disp: 90 tablet, Rfl: 0    timolol maleate 0.5% (TIMOPTIC) 0.5 % Drop, Place 1 drop into both eyes 2 (two) times daily., Disp: 15 mL, Rfl: 1    triamcinolone acetonide 0.1% (KENALOG) 0.1 % cream, Apply topically 2 (two) times daily. x 1-2 wks then prn flares only, Disp: 80 g, Rfl: 2    fluticasone propionate (FLONASE) 50 mcg/actuation nasal spray, 2 sprays (100 mcg total) by Each Nostril route 2 (two) times daily., Disp: 31.6 mL, Rfl: 11    tirzepatide, weight loss, (ZEPBOUND) 7.5 mg/0.5 mL PnIj, Inject 7.5 mg into the skin every 7 days. (Patient not taking: Reported on 4/24/2025), Disp: 0.5 mL, Rfl: 11

## 2025-05-19 ENCOUNTER — RESULTS FOLLOW-UP (OUTPATIENT)
Dept: INTERNAL MEDICINE | Facility: CLINIC | Age: 55
End: 2025-05-19

## 2025-05-19 NOTE — PROGRESS NOTES
Good news!    Your stool COLON CANCER SCREENING stool test shows NO BLOOD.    Recommendations are to repeat this 3 YEARS as screening for colon cancer.

## 2025-07-01 ENCOUNTER — OFFICE VISIT (OUTPATIENT)
Dept: INTERNAL MEDICINE | Facility: CLINIC | Age: 55
End: 2025-07-01
Payer: COMMERCIAL

## 2025-07-01 DIAGNOSIS — R42 VERTIGO: Primary | ICD-10-CM

## 2025-07-01 PROCEDURE — 1159F MED LIST DOCD IN RCRD: CPT | Mod: CPTII,95,, | Performed by: INTERNAL MEDICINE

## 2025-07-01 PROCEDURE — G2211 COMPLEX E/M VISIT ADD ON: HCPCS | Mod: 95,,, | Performed by: INTERNAL MEDICINE

## 2025-07-01 PROCEDURE — 98006 SYNCH AUDIO-VIDEO EST MOD 30: CPT | Mod: 95,,, | Performed by: INTERNAL MEDICINE

## 2025-07-01 PROCEDURE — 1160F RVW MEDS BY RX/DR IN RCRD: CPT | Mod: CPTII,95,, | Performed by: INTERNAL MEDICINE

## 2025-07-01 RX ORDER — MECLIZINE HYDROCHLORIDE 25 MG/1
25 TABLET ORAL EVERY 6 HOURS PRN
Qty: 90 TABLET | Refills: 1 | Status: SHIPPED | OUTPATIENT
Start: 2025-07-01

## 2025-07-01 NOTE — PROGRESS NOTES
"The patient location is: [place]  The chief complaint leading to consultation is: Dizziness    Visit type: audiovisual    Face to Face time with patient: 15  32 minutes of total time spent on the encounter, which includes face to face time and non-face to face time preparing to see the patient (eg, review of tests), Obtaining and/or reviewing separately obtained history, Documenting clinical information in the electronic or other health record, Independently interpreting results (not separately reported) and communicating results to the patient/family/caregiver, or Care coordination (not separately reported).         Each patient to whom he or she provides medical services by telemedicine is:  (1) informed of the relationship between the physician and patient and the respective role of any other health care provider with respect to management of the patient; and (2) notified that he or she may decline to receive medical services by telemedicine and may withdraw from such care at any time.    Notes:        Subjective:      Patient ID: Gisella Bennett is a 54 y.o. female.    Chief Complaint: No chief complaint on file.    Gisella Bennett is a 54 y.o. female with chronic conditions significant for HTN, seasonal allergies, Uveitis, positive AMALIA, GERD, obesity, chronic shoulder pain. Presenting for acute problem.     DIZZINESS: Ms Bennett presents with complaints of dizziness episode occurred one week ago Sunday, characterized by severe dizziness similar to orthostatic lightheadedness. Symptoms persisted throughout the day and were accompanied by head fullness without headache. She describes sensation as pressure in head and feeling "out of it" and not herself. Symptoms continued through Tuesday morning, with residual head fullness despite reduced dizziness. She experienced a recurrence of dizziness this past Saturday, which was brief and self-resolving.    URGENT CARE VISIT:  She sought care at urgent care " "during initial episode for head fullness. Blood pressure was normal. Epley maneuver was performed, during which she reported feeling "a little looseness" when moving head to the right. Provider noted she was symptomatic. She reports feeling improvement with the Epley maneuver.    ROS:  General: +fatigue  Respiratory: +cough  Neurological: -headache, +dizziness     Denies any chest pain, shortness of breath, nausea vomiting constipation diarrhea, blood in stool, heartburn    Visit today is associated with current or anticipated ongoing medical care related to this patient's single serious condition/complex condition of HTN, seasonal allergies, Uveitis, positive AMALIA, GERD, obesity, chronic shoulder pain. The patient will return to see me as these issues will be followed longitudinally.      Current Medications[1]    Lab Results   Component Value Date    HGBA1C 5.1 09/25/2024    HGBA1C 5.4 04/06/2023    HGBA1C 5.4 12/10/2020     No results found for: "MICALBCREAT"  Lab Results   Component Value Date    LDLCALC 108.4 09/25/2024    LDLCALC 101.6 04/06/2023    CHOL 204 (H) 09/25/2024    HDL 83 (H) 09/25/2024    TRIG 63 09/25/2024       Lab Results   Component Value Date     09/25/2024    K 4.2 09/25/2024     09/25/2024    CO2 25 09/25/2024    GLU 81 09/25/2024    BUN 12 09/25/2024    CREATININE 0.9 09/25/2024    CALCIUM 9.7 09/25/2024    PROT 7.8 09/25/2024    ALBUMIN 3.7 09/25/2024    BILITOT 0.6 09/25/2024    ALKPHOS 115 09/25/2024    AST 32 09/25/2024    ALT 32 09/25/2024    ANIONGAP 7 (L) 09/25/2024    ESTGFRAFRICA >60.0 02/25/2022    EGFRNONAA >60.0 02/25/2022    WBC 5.58 09/25/2024    HGB 12.0 09/25/2024    HGB 11.7 (L) 04/06/2023    HCT 38.5 09/25/2024    MCV 86 09/25/2024     09/25/2024    TSH 0.775 04/06/2023    HEPCAB Negative 12/10/2020       Lab Results   Component Value Date    FSH 12.00 12/30/2019    [id number] >155 (H) 12/10/2020    [id number] 26 (L) 06/05/2018    ZUGBOMWB22 >2000 (H) " 02/25/2022    FERRITIN 66 02/25/2022    IRON 58 02/25/2022    TRANSFERRIN 297 02/25/2022    TIBC 440 02/25/2022    FESATURATED 13 (L) 02/25/2022         Past Medical History:   Diagnosis Date    Acute iritis     Allergy     Atopic dermatitis     Environmental allergies 03/29/2016    Hypertension     Lower back pain 03/29/2016    Recurrent upper respiratory infection (URI)     Trigeminal neuralgia 04/14/2015    Uveitis of right eye 2003, 2018    Vitamin D deficiency disease 02/16/2015     Past Surgical History:   Procedure Laterality Date    BUNIONECTOMY Left     CHOLECYSTECTOMY      INTRAUTERINE DEVICE INSERTION  2013    MIRENA    LAPAROSCOPIC CHOLECYSTECTOMY N/A 5/13/2021    Procedure: CHOLECYSTECTOMY, LAPAROSCOPIC;  Surgeon: Deborah Agosto MD;  Location: [facility] OR [address] [place] [address];  Service: General;  Laterality: N/A;    SPINAL FUSION  04/2013    Lumbar Spinal fusion, laminectomies     Social History     Social History Narrative    , works in [employer].      Family History   Problem Relation Name Age of Onset    Hypertension Mother      Thyroid disease Mother          Goiter removed    Heart disease Father  56        congenital defect, aortic replacement    Kidney disease Father          after Cardiac study     Diabetes Mellitus Father      Diabetes Father      Eczema Brother      Breast cancer Maternal Aunt      No Known Problems Maternal Grandmother      No Known Problems Maternal Grandfather      No Known Problems Paternal Grandmother      No Known Problems Paternal Grandfather      Sarcoidosis Cousin  55        lung involvement    Eczema Other      Eczema Daughter      Asthma Neg Hx      Emphysema Neg Hx      Blindness Neg Hx      Glaucoma Neg Hx      Macular degeneration Neg Hx      Retinal detachment Neg Hx      Inflammatory bowel disease Neg Hx      Lupus Neg Hx      Psoriasis Neg Hx      Rheum arthritis Neg Hx      Colon cancer Neg Hx      Ovarian cancer Neg Hx      Heart attacks under age 50 Neg  "Hx       There were no vitals filed for this visit.  Objective:   Physical Exam  Constitutional:       Appearance: Normal appearance.   HENT:      Head: Normocephalic.      Nose: Nose normal.   Neurological:      Mental Status: She is alert and oriented to person, place, and time. Mental status is at baseline.   Psychiatric:         Mood and Affect: Mood normal.         Behavior: Behavior normal.       Assessment:   - Assessed symptoms of dizziness, fullness in head, and feeling "out of it" as likely vertigo, possibly triggered by recent illness or chronic sinusitis.  - Ruled out BP as primary cause, noting normal readings at urgent care.  - Blood sugar results (66 mg/dL) not significantly concerning in context of symptoms and activities.  - Epley maneuver performed at urgent care was partially effective, supporting vertigo diagnosis.  - Vestibular dysfunction, likely due to inner ear crystal misalignment, is the primary issue.      1. Vertigo      Plan:     Orders Placed This Encounter    meclizine (ANTIVERT) 25 mg tablet   - Perform Epley maneuver for vertigo relief    There are no Patient Instructions on file for this visit.  Tests to Keep You Healthy    Mammogram: SCHEDULED  Colon Cancer Screening: Met on 5/9/2025  Cervical Cancer Screening: Met on 12/19/2023  Last Blood Pressure <= 139/89 (4/24/2025): Yes           [1]   Current Outpatient Medications:     amLODIPine (NORVASC) 5 MG tablet, TAKE 1 TABLET(5 MG) BY MOUTH DAILY, Disp: 90 tablet, Rfl: 3    azelastine (ASTELIN) 137 mcg (0.1 %) nasal spray, 1 spray (137 mcg total) by Nasal route 2 (two) times daily as needed for Rhinitis., Disp: 30 mL, Rfl: 11    cycloSPORINE (VEVYE) 0.1 % Drop, Place 1 drop into both eyes 2 (two) times a day., Disp: 2 mL, Rfl: 5    diclofenac sodium (VOLTAREN) 1 % Gel, Apply 2 g topically 4 (four) times daily., Disp: 100 g, Rfl: 2    EPIPEN 2-CHIDI 0.3 mg/0.3 mL (1:1,000) AtIn, , Disp: , Rfl:     estradioL (ESTRACE) 0.01 % (0.1 " mg/gram) vaginal cream, Apply pea-sized amount nightly for two weeks then twice a week there after, Disp: 42.5 g, Rfl: 8    fexofenadine (ALLEGRA) 180 MG tablet, Take 1 tablet (180 mg total) by mouth once daily., Disp: 90 tablet, Rfl: 3    fluticasone propionate (FLONASE) 50 mcg/actuation nasal spray, 2 sprays (100 mcg total) by Each Nostril route 2 (two) times daily., Disp: 31.6 mL, Rfl: 11    hydroCHLOROthiazide (MICROZIDE) 12.5 mg capsule, TAKE 1 CAPSULE(12.5 MG) BY MOUTH DAILY, Disp: 90 capsule, Rfl: 2    ipratropium-albuteroL (COMBIVENT)  mcg/actuation inhaler, Inhale 1 puff into the lungs every 4 (four) hours as needed for Wheezing. Rescue, Disp: 4 g, Rfl: 11    meclizine (ANTIVERT) 25 mg tablet, Take 1 tablet (25 mg total) by mouth every 6 (six) hours as needed for Dizziness., Disp: 90 tablet, Rfl: 1    montelukast (SINGULAIR) 10 mg tablet, Take 1 tablet (10 mg total) by mouth once daily., Disp: 90 tablet, Rfl: 3    multivitamin (THERAGRAN) per tablet, Take 1 tablet by mouth once daily., Disp: , Rfl:     omega-3 fatty acids/fish oil (FISH OIL-OMEGA-3 FATTY ACIDS) 300-1,000 mg capsule, Take by mouth once daily., Disp: , Rfl:     pantoprazole (PROTONIX) 40 MG tablet, Take 1 tablet (40 mg total) by mouth once daily., Disp: 90 tablet, Rfl: 0    timolol maleate 0.5% (TIMOPTIC) 0.5 % Drop, Place 1 drop into both eyes 2 (two) times daily., Disp: 15 mL, Rfl: 1    tirzepatide, weight loss, (ZEPBOUND) 7.5 mg/0.5 mL PnIj, Inject 7.5 mg into the skin every 7 days. (Patient not taking: Reported on 4/24/2025), Disp: 0.5 mL, Rfl: 11    triamcinolone acetonide 0.1% (KENALOG) 0.1 % cream, Apply topically 2 (two) times daily. x 1-2 wks then prn flares only, Disp: 80 g, Rfl: 2

## 2025-07-14 ENCOUNTER — PATIENT MESSAGE (OUTPATIENT)
Dept: OPTOMETRY | Facility: CLINIC | Age: 55
End: 2025-07-14
Payer: COMMERCIAL

## 2025-07-15 ENCOUNTER — TELEPHONE (OUTPATIENT)
Dept: OPHTHALMOLOGY | Facility: CLINIC | Age: 55
End: 2025-07-15
Payer: COMMERCIAL

## 2025-07-15 ENCOUNTER — OFFICE VISIT (OUTPATIENT)
Dept: OPTOMETRY | Facility: CLINIC | Age: 55
End: 2025-07-15
Payer: COMMERCIAL

## 2025-07-15 DIAGNOSIS — H20.021 IRITIS, RECURRENT, RIGHT: Primary | ICD-10-CM

## 2025-07-15 DIAGNOSIS — H21.561 PUPIL IRREGULAR OF RIGHT EYE: ICD-10-CM

## 2025-07-15 PROCEDURE — 1159F MED LIST DOCD IN RCRD: CPT | Mod: CPTII,S$GLB,, | Performed by: OPTOMETRIST

## 2025-07-15 PROCEDURE — 1160F RVW MEDS BY RX/DR IN RCRD: CPT | Mod: CPTII,S$GLB,, | Performed by: OPTOMETRIST

## 2025-07-15 PROCEDURE — 99212 OFFICE O/P EST SF 10 MIN: CPT | Mod: S$GLB,,, | Performed by: OPTOMETRIST

## 2025-07-15 PROCEDURE — 99999 PR PBB SHADOW E&M-EST. PATIENT-LVL III: CPT | Mod: PBBFAC,,, | Performed by: OPTOMETRIST

## 2025-07-15 RX ORDER — DIFLUPREDNATE OPHTHALMIC 0.5 MG/ML
1 EMULSION OPHTHALMIC 4 TIMES DAILY
Qty: 5 ML | Refills: 0 | Status: SHIPPED | OUTPATIENT
Start: 2025-07-15 | End: 2025-07-22

## 2025-07-18 NOTE — PROGRESS NOTES
HPI    Last eye exam was 3/28/25 with Dr. Patel.  Patient concerned about possible uveitis flare-up OD. OD pupil has been   dilated since Sunday and blurry. Vision is gotten clearer but had dull   pain OD yesterday which is when she messaged Dr. Patel. OS is fine. Had   epsiode of vertigo a month ago and PCP told her to follow up with her eye   doctor.     Vevye BID OU  Ivizia BID OU    Last edited by Mel Werner MA on 7/15/2025 12:56 PM.            Assessment /Plan     For exam results, see Encounter Report.    Iritis, recurrent, right  Pupil irregular of right eye    Restart cycloplegic drops daily x 3 days  Restart   difluprednate (DUREZOL) 0.05 % Drop ophthalmic solution; Place 1 drop into the right eye 4 (four) times daily. for 7 days , then 3/2/1 taper       Started in approx 2017, increasing in frequecy since then    + AMALIA, w/up otherwise unremarkable (1st w/up negative when she saw rheum ~20 yrs ago)    Follow up with

## 2025-07-28 ENCOUNTER — HOSPITAL ENCOUNTER (OUTPATIENT)
Dept: RADIOLOGY | Facility: OTHER | Age: 55
Discharge: HOME | End: 2025-07-28
Attending: INTERNAL MEDICINE
Payer: COMMERCIAL

## 2025-07-28 VITALS — BODY MASS INDEX: 33.69 KG/M2 | HEIGHT: 64 IN | WEIGHT: 197.31 LBS

## 2025-07-28 DIAGNOSIS — Z12.31 OTHER SCREENING MAMMOGRAM: ICD-10-CM

## 2025-07-28 PROCEDURE — 77063 BREAST TOMOSYNTHESIS BI: CPT | Mod: TC

## 2025-08-27 ENCOUNTER — OFFICE VISIT (OUTPATIENT)
Dept: OPHTHALMOLOGY | Facility: CLINIC | Age: 55
End: 2025-08-27
Attending: OPHTHALMOLOGY
Payer: COMMERCIAL

## 2025-08-27 DIAGNOSIS — H20.9 IRITIS: Primary | ICD-10-CM

## 2025-08-27 PROCEDURE — 1160F RVW MEDS BY RX/DR IN RCRD: CPT | Mod: CPTII,S$GLB,, | Performed by: OPHTHALMOLOGY

## 2025-08-27 PROCEDURE — 99999 PR PBB SHADOW E&M-EST. PATIENT-LVL III: CPT | Mod: PBBFAC,,, | Performed by: OPHTHALMOLOGY

## 2025-08-27 PROCEDURE — 92004 COMPRE OPH EXAM NEW PT 1/>: CPT | Mod: S$GLB,,, | Performed by: OPHTHALMOLOGY

## 2025-08-27 PROCEDURE — 1159F MED LIST DOCD IN RCRD: CPT | Mod: CPTII,S$GLB,, | Performed by: OPHTHALMOLOGY

## 2025-08-27 RX ORDER — VALACYCLOVIR HYDROCHLORIDE 500 MG/1
500 TABLET, FILM COATED ORAL DAILY
Qty: 90 TABLET | Refills: 3 | Status: SHIPPED | OUTPATIENT
Start: 2025-08-27 | End: 2026-08-22

## 2025-08-27 RX ORDER — DIFLUPREDNATE OPHTHALMIC 0.5 MG/ML
1 EMULSION OPHTHALMIC DAILY
Qty: 5 ML | Refills: 6 | Status: SHIPPED | OUTPATIENT
Start: 2025-08-27 | End: 2025-09-06

## (undated) DEVICE — ELECTRODE REM PLYHSV RETURN 9

## (undated) DEVICE — ADHESIVE DERMABOND ADVANCED

## (undated) DEVICE — TUBING HF INSUFFLATION W/ FLTR

## (undated) DEVICE — CANNULA ENDOPATH XCEL 5X100MM

## (undated) DEVICE — TRAY MINOR GEN SURG

## (undated) DEVICE — IRRIGATOR ENDOSCOPY DISP.

## (undated) DEVICE — SUT 0 VICRYL / UR6 (J603)

## (undated) DEVICE — SCISSOR 5MMX35CM DIRECT DRIVE

## (undated) DEVICE — KIT ANTIFOG

## (undated) DEVICE — TROCAR ENDOPATH XCEL 5X100MM

## (undated) DEVICE — NDL HYPO REG 25G X 1 1/2

## (undated) DEVICE — BAG TISS RETRV MONARCH 10MM

## (undated) DEVICE — SEE MEDLINE ITEM 157116

## (undated) DEVICE — DRAPE CORETEMP FLD WRM 56X62IN

## (undated) DEVICE — DRAPE C ARM 42 X 120 10/BX

## (undated) DEVICE — SYR 30CC LUER LOCK

## (undated) DEVICE — TROCAR ENDOPATH XCEL 12X100MM

## (undated) DEVICE — ADHESIVE DERMABOND MINI HV

## (undated) DEVICE — SUT MCRYL PLUS 4-0 PS2 27IN

## (undated) DEVICE — BLADE SURG STAINLESS STEEL #11

## (undated) DEVICE — CLIP HEMO-LOK ML

## (undated) DEVICE — DRAPE ABDOMINAL TIBURON 14X11